# Patient Record
Sex: MALE | Race: WHITE | Employment: OTHER | URBAN - METROPOLITAN AREA
[De-identification: names, ages, dates, MRNs, and addresses within clinical notes are randomized per-mention and may not be internally consistent; named-entity substitution may affect disease eponyms.]

---

## 2017-05-23 ENCOUNTER — HOSPITAL ENCOUNTER (OUTPATIENT)
Dept: OTHER | Age: 72
Discharge: HOME | End: 2017-05-31
Attending: INTERNAL MEDICINE | Admitting: INTERNAL MEDICINE

## 2022-06-03 ENCOUNTER — HOSPITAL ENCOUNTER (INPATIENT)
Age: 77
LOS: 15 days | Discharge: NURSING, REHAB OR LONG-TERM CARE | DRG: 560 | End: 2022-06-18
Attending: STUDENT IN AN ORGANIZED HEALTH CARE EDUCATION/TRAINING PROGRAM | Admitting: STUDENT IN AN ORGANIZED HEALTH CARE EDUCATION/TRAINING PROGRAM
Payer: OTHER GOVERNMENT

## 2022-06-03 DIAGNOSIS — Z87.81 S/P LEFT HIP FRACTURE: Primary | ICD-10-CM

## 2022-06-03 LAB
ANION GAP SERPL CALCULATED.3IONS-SCNC: 8 MMOL/L (ref 3–16)
BUN BLDV-MCNC: 13 MG/DL (ref 7–20)
CALCIUM SERPL-MCNC: 8.7 MG/DL (ref 8.3–10.6)
CHLORIDE BLD-SCNC: 99 MMOL/L (ref 99–110)
CO2: 29 MMOL/L (ref 21–32)
CREAT SERPL-MCNC: 0.8 MG/DL (ref 0.8–1.3)
GFR AFRICAN AMERICAN: >60
GFR NON-AFRICAN AMERICAN: >60
GLUCOSE BLD-MCNC: 116 MG/DL (ref 70–99)
GLUCOSE BLD-MCNC: 142 MG/DL (ref 70–99)
GLUCOSE BLD-MCNC: 187 MG/DL (ref 70–99)
PERFORMED ON: ABNORMAL
PERFORMED ON: ABNORMAL
POTASSIUM REFLEX MAGNESIUM: 4 MMOL/L (ref 3.5–5.1)
SODIUM BLD-SCNC: 136 MMOL/L (ref 136–145)

## 2022-06-03 PROCEDURE — 80048 BASIC METABOLIC PNL TOTAL CA: CPT

## 2022-06-03 PROCEDURE — 6370000000 HC RX 637 (ALT 250 FOR IP): Performed by: STUDENT IN AN ORGANIZED HEALTH CARE EDUCATION/TRAINING PROGRAM

## 2022-06-03 PROCEDURE — 36415 COLL VENOUS BLD VENIPUNCTURE: CPT

## 2022-06-03 PROCEDURE — 1280000000 HC REHAB R&B

## 2022-06-03 RX ORDER — INSULIN GLARGINE 100 [IU]/ML
12 INJECTION, SOLUTION SUBCUTANEOUS NIGHTLY
Status: DISCONTINUED | OUTPATIENT
Start: 2022-06-03 | End: 2022-06-06

## 2022-06-03 RX ORDER — INSULIN LISPRO 100 [IU]/ML
0-6 INJECTION, SOLUTION INTRAVENOUS; SUBCUTANEOUS
Status: DISCONTINUED | OUTPATIENT
Start: 2022-06-03 | End: 2022-06-09

## 2022-06-03 RX ORDER — ENOXAPARIN SODIUM 100 MG/ML
40 INJECTION SUBCUTANEOUS DAILY
Status: DISCONTINUED | OUTPATIENT
Start: 2022-06-04 | End: 2022-06-18 | Stop reason: HOSPADM

## 2022-06-03 RX ORDER — PANTOPRAZOLE SODIUM 40 MG/1
40 TABLET, DELAYED RELEASE ORAL
Status: DISCONTINUED | OUTPATIENT
Start: 2022-06-04 | End: 2022-06-18 | Stop reason: HOSPADM

## 2022-06-03 RX ORDER — POLYETHYLENE GLYCOL 3350 17 G/17G
17 POWDER, FOR SOLUTION ORAL DAILY
Status: DISCONTINUED | OUTPATIENT
Start: 2022-06-04 | End: 2022-06-18 | Stop reason: HOSPADM

## 2022-06-03 RX ORDER — ALBUTEROL SULFATE 90 UG/1
2 AEROSOL, METERED RESPIRATORY (INHALATION) EVERY 6 HOURS PRN
Status: DISCONTINUED | OUTPATIENT
Start: 2022-06-03 | End: 2022-06-13

## 2022-06-03 RX ORDER — CHOLECALCIFEROL (VITAMIN D3) 125 MCG
1000 CAPSULE ORAL DAILY
Status: DISCONTINUED | OUTPATIENT
Start: 2022-06-04 | End: 2022-06-18 | Stop reason: HOSPADM

## 2022-06-03 RX ORDER — ASPIRIN 81 MG/1
81 TABLET ORAL DAILY
Status: DISCONTINUED | OUTPATIENT
Start: 2022-06-04 | End: 2022-06-18 | Stop reason: HOSPADM

## 2022-06-03 RX ORDER — TROSPIUM CHLORIDE 20 MG/1
20 TABLET, FILM COATED ORAL
Status: DISCONTINUED | OUTPATIENT
Start: 2022-06-03 | End: 2022-06-18 | Stop reason: HOSPADM

## 2022-06-03 RX ORDER — AMLODIPINE BESYLATE 5 MG/1
10 TABLET ORAL DAILY
Status: DISCONTINUED | OUTPATIENT
Start: 2022-06-04 | End: 2022-06-15

## 2022-06-03 RX ORDER — ATORVASTATIN CALCIUM 40 MG/1
40 TABLET, FILM COATED ORAL NIGHTLY
Status: DISCONTINUED | OUTPATIENT
Start: 2022-06-03 | End: 2022-06-18 | Stop reason: HOSPADM

## 2022-06-03 RX ORDER — VALSARTAN 80 MG/1
160 TABLET ORAL DAILY
Status: DISCONTINUED | OUTPATIENT
Start: 2022-06-04 | End: 2022-06-08

## 2022-06-03 RX ORDER — TRAZODONE HYDROCHLORIDE 50 MG/1
50 TABLET ORAL NIGHTLY PRN
Status: DISCONTINUED | OUTPATIENT
Start: 2022-06-03 | End: 2022-06-18 | Stop reason: HOSPADM

## 2022-06-03 RX ORDER — SENNA AND DOCUSATE SODIUM 50; 8.6 MG/1; MG/1
2 TABLET, FILM COATED ORAL DAILY
Status: DISCONTINUED | OUTPATIENT
Start: 2022-06-04 | End: 2022-06-06

## 2022-06-03 RX ORDER — ACETAMINOPHEN 325 MG/1
650 TABLET ORAL EVERY 6 HOURS PRN
Status: DISCONTINUED | OUTPATIENT
Start: 2022-06-03 | End: 2022-06-18 | Stop reason: HOSPADM

## 2022-06-03 RX ORDER — BISACODYL 10 MG
10 SUPPOSITORY, RECTAL RECTAL DAILY PRN
Status: DISCONTINUED | OUTPATIENT
Start: 2022-06-03 | End: 2022-06-18 | Stop reason: HOSPADM

## 2022-06-03 RX ORDER — OXYCODONE HYDROCHLORIDE 5 MG/1
5 TABLET ORAL EVERY 4 HOURS PRN
Status: DISCONTINUED | OUTPATIENT
Start: 2022-06-03 | End: 2022-06-08

## 2022-06-03 RX ORDER — ONDANSETRON 4 MG/1
4 TABLET, ORALLY DISINTEGRATING ORAL EVERY 8 HOURS PRN
Status: DISCONTINUED | OUTPATIENT
Start: 2022-06-03 | End: 2022-06-18 | Stop reason: HOSPADM

## 2022-06-03 RX ORDER — INSULIN LISPRO 100 [IU]/ML
0-3 INJECTION, SOLUTION INTRAVENOUS; SUBCUTANEOUS NIGHTLY
Status: DISCONTINUED | OUTPATIENT
Start: 2022-06-03 | End: 2022-06-09

## 2022-06-03 RX ORDER — DEXTROSE MONOHYDRATE 50 MG/ML
100 INJECTION, SOLUTION INTRAVENOUS PRN
Status: DISCONTINUED | OUTPATIENT
Start: 2022-06-03 | End: 2022-06-18 | Stop reason: HOSPADM

## 2022-06-03 RX ORDER — OXYBUTYNIN CHLORIDE 5 MG/1
5 TABLET ORAL 3 TIMES DAILY
Status: DISCONTINUED | OUTPATIENT
Start: 2022-06-03 | End: 2022-06-04

## 2022-06-03 RX ORDER — GABAPENTIN 300 MG/1
300 CAPSULE ORAL 2 TIMES DAILY
Status: DISCONTINUED | OUTPATIENT
Start: 2022-06-03 | End: 2022-06-18 | Stop reason: HOSPADM

## 2022-06-03 RX ADMIN — OXYBUTYNIN CHLORIDE 5 MG: 5 TABLET ORAL at 21:20

## 2022-06-03 RX ADMIN — INSULIN LISPRO 1 UNITS: 100 INJECTION, SOLUTION INTRAVENOUS; SUBCUTANEOUS at 21:22

## 2022-06-03 RX ADMIN — TROSPIUM CHLORIDE 20 MG: 20 TABLET, FILM COATED ORAL at 16:36

## 2022-06-03 RX ADMIN — GABAPENTIN 300 MG: 300 CAPSULE ORAL at 21:20

## 2022-06-03 RX ADMIN — ATORVASTATIN CALCIUM 40 MG: 40 TABLET, FILM COATED ORAL at 21:20

## 2022-06-03 RX ADMIN — TRAZODONE HYDROCHLORIDE 50 MG: 50 TABLET ORAL at 21:20

## 2022-06-03 RX ADMIN — OXYBUTYNIN CHLORIDE 5 MG: 5 TABLET ORAL at 16:36

## 2022-06-03 RX ADMIN — INSULIN GLARGINE 12 UNITS: 100 INJECTION, SOLUTION SUBCUTANEOUS at 21:22

## 2022-06-03 ASSESSMENT — PAIN SCALES - GENERAL
PAINLEVEL_OUTOF10: 3
PAINLEVEL_OUTOF10: 0

## 2022-06-03 ASSESSMENT — PAIN DESCRIPTION - ORIENTATION: ORIENTATION: LEFT

## 2022-06-03 ASSESSMENT — PAIN DESCRIPTION - LOCATION: LOCATION: HIP

## 2022-06-03 NOTE — H&P
Patient: Maria Esther Martines  [id number]  Date: 6/3/2022      Chief Complaint: left hip fracture    History of Present Illness/Hospital Course:  Maria Esther Martines is a 78year old male with a past medical history significant for DM2, HTN, HLD, BERNARDO, COPD, prostate cancer s/p prostatectomy (2019), and GERD who fell down 2 steps and was found to have left hip and femur fracture on 5/6/22. He had surgery in [place] and was discharge to SNF in [facility]. He left SNF due to being unhappy with his care there and presented to [facility] on 5/29/22 with hip pain. He is admitted to [facility] on 6/3/22 due to functional deficits below his baseline. Today he is seen in his room without family present. He reports hip pain with weight bearing. He otherwise denies acute complaints. He is highly motivated to work with therapy. Prior Level of Function:  Independent in self care, indoor mobility, stairs, functional cognition    Current Level of Function:  Setup/clean-up for eating, oral hygiene  Max assist for lower body dressing  Dependent for lying to sitting on side of bed, sit to stand  Mod assist for chair/bed transfers, walk 10 feet     has a past medical history of Diabetes mellitus ([address] and Hypertension. has no past surgical history on file. reports that he has never smoked. He does not have any smokeless tobacco history on file. He reports that he does not drink alcohol and does not use drugs. family history is not on file.     Current Facility-Administered Medications   Medication Dose Route Frequency Provider Last Rate Last Admin    acetaminophen (TYLENOL) tablet 650 mg  650 mg Oral Q6H PRN Ana Sotelo MD        [START ON 6/4/2022] enoxaparin (LOVENOX) injection 40 mg  40 mg SubCUTAneous Daily Ana Sotelo MD        oxyCODONE (ROXICODONE) immediate release tablet 5 mg  5 mg Oral Q4H PRN Ana Sotelo MD        glucose chewable tablet 16 g  4 tablet Oral PRN Ana Sotelo MD        dextrose bolus 10% 125 mL  125 mL IntraVENous PRN Megha Newton MD        Or    dextrose bolus 10% 250 mL  250 mL IntraVENous PRN Megha Newton MD        glucagon (rDNA) injection 1 mg  1 mg IntraMUSCular PRN Megha Newton MD        dextrose 5 % solution  100 mL/hr IntraVENous PRN Megha Newton MD        insulin glargine (LANTUS) injection vial 12 Units  12 Units SubCUTAneous Nightly Megha Newton MD        insulin lispro (HUMALOG) injection vial 0-6 Units  0-6 Units SubCUTAneous TID [address] Megha Newton MD        insulin lispro (HUMALOG) injection vial 0-3 Units  0-3 Units SubCUTAneous Nightly Megha Newton MD        albuterol sulfate  (90 Base) MCG/ACT inhaler 2 puff  2 puff Inhalation Q6H PRN Megha Newton MD        [START ON 6/4/2022] amLODIPine (NORVASC) tablet 10 mg  10 mg Oral Daily Megha Newton MD        [START ON 6/4/2022] aspirin EC tablet 81 mg  81 mg Oral Daily Megha Newton MD        bisacodyl (DULCOLAX) suppository 10 mg  10 mg Rectal Daily PRN Megha Newton MD        [START ON 6/4/2022] vitamin B-12 (CYANOCOBALAMIN) tablet 1,000 mcg  1,000 mcg Oral Daily Megha Newton MD        gabapentin (NEURONTIN) capsule 300 mg  300 mg Oral BID Megha Newton MD        [START ON 6/4/2022] pantoprazole (PROTONIX) tablet 40 mg  40 mg Oral QAM AC Megha Newton MD        ondansetron (ZOFRAN-ODT) disintegrating tablet 4 mg  4 mg Oral Q8H PRN Megha Newton MD        [START ON 6/4/2022] polyethylene glycol (GLYCOLAX) packet 17 g  17 g Oral Daily Megha Newton MD        atorvastatin (LIPITOR) tablet 40 mg  40 mg Oral Nightly Megha Newton MD        [START ON 6/4/2022] sennosides-docusate sodium (SENOKOT-S) 8.6-50 MG tablet 2 tablet  2 tablet Oral Daily Megha Newton MD        [START ON 6/4/2022] sertraline (ZOLOFT) tablet 100 mg  100 mg Oral Daily Megha Newton MD        trospium [facility]) tablet 20 mg  20 mg Oral BID AC Melissa Mendoza MD        [START ON 6/4/2022] valsartan (DIOVAN) tablet 160 mg  160 mg Oral Daily Melissa Mendoza MD        oxybutynin [facility]) tablet 5 mg  5 mg Oral TID Melissa Mendoza MD           Allergies   Allergen Reactions    Cipro [Ciprofloxacin Hcl] Hives    Lisinopril Other (See Comments)     cough    Metformin And Related Diarrhea    Penicillins        Current Facility-Administered Medications   Medication Dose Route Frequency Provider Last Rate Last Admin    acetaminophen (TYLENOL) tablet 650 mg  650 mg Oral Q6H PRN Melissa Mendoza MD        [START ON 6/4/2022] enoxaparin (LOVENOX) injection 40 mg  40 mg SubCUTAneous Daily Melissa Mendoza MD        oxyCODONE (ROXICODONE) immediate release tablet 5 mg  5 mg Oral Q4H PRN Melissa Mendoza MD        glucose chewable tablet 16 g  4 tablet Oral PRN Melissa Mendoza MD        dextrose bolus 10% 125 mL  125 mL IntraVENous PRN Melissa Mendoza MD        Or    dextrose bolus 10% 250 mL  250 mL IntraVENous PRN Melissa Mendoza MD        glucagon (rDNA) injection 1 mg  1 mg IntraMUSCular PRN Melissa Mendoza MD        dextrose 5 % solution  100 mL/hr IntraVENous PRN Melissa Mendoza MD        insulin glargine (LANTUS) injection vial 12 Units  12 Units SubCUTAneous Nightly Melissa Mendoza MD        insulin lispro (HUMALOG) injection vial 0-6 Units  0-6 Units SubCUTAneous TID [facility] Melissa Mendoza MD        insulin lispro (HUMALOG) injection vial 0-3 Units  0-3 Units SubCUTAneous Nightly Melissa Mendoza MD        albuterol sulfate  (90 Base) MCG/ACT inhaler 2 puff  2 puff Inhalation Q6H PRN Melissa Mendoza MD        [START ON 6/4/2022] amLODIPine (NORVASC) tablet 10 mg  10 mg Oral Daily Melissa Mendoza MD        [START ON 6/4/2022] aspirin EC tablet 81 mg  81 mg Oral Daily Melissa Mendoza MD        bisacodyl (DULCOLAX) suppository 10 mg  10 mg Rectal Daily PRN Rubi Pulido MD        [START ON 6/4/2022] vitamin B-12 (CYANOCOBALAMIN) tablet 1,000 mcg  1,000 mcg Oral Daily Rubi Pulido MD        gabapentin (NEURONTIN) capsule 300 mg  300 mg Oral BID Rubi Pulido MD        [START ON 6/4/2022] pantoprazole (PROTONIX) tablet 40 mg  40 mg Oral QAM AC Rubi Pulido MD        ondansetron (ZOFRAN-ODT) disintegrating tablet 4 mg  4 mg Oral Q8H PRN Rubi Pulido MD        [START ON 6/4/2022] polyethylene glycol (GLYCOLAX) packet 17 g  17 g Oral Daily Rubi Pulido MD        atorvastatin (LIPITOR) tablet 40 mg  40 mg Oral Nightly Rubi Pulido MD        [START ON 6/4/2022] sennosides-docusate sodium (SENOKOT-S) 8.6-50 MG tablet 2 tablet  2 tablet Oral Daily Rubi Pulido MD        [START ON 6/4/2022] sertraline (ZOLOFT) tablet 100 mg  100 mg Oral Daily Rubi Pulido MD        [facility]) tablet 20 mg  20 mg Oral BID AC Rubi Pulido MD        [START ON 6/4/2022] valsartan (DIOVAN) tablet 160 mg  160 mg Oral Daily Rubi Pulido MD        oxybutynin [facility]) tablet 5 mg  5 mg Oral TID Rubi Pulido MD             REVIEW OF SYSTEMS:   CONSTITUTIONAL: negative for fevers, chills, diaphoresis, activity change, appetite change, fatigue, night sweats and unexpected weight change. EYES: negative for blurred vision, eye discharge, visual disturbance and icterus. HEENT: negative for hearing loss, tinnitus, ear drainage, sinus pressure, nasal congestion, epistaxis and snoring. RESPIRATORY: Negative for hemoptysis, cough, sputum production. CARDIOVASCULAR: negative for chest pain, palpitations, exertional chest pressure/discomfort, edema, syncope. GASTROINTESTINAL: negative for nausea, vomiting, diarrhea, constipation, blood in stool and abdominal pain.    GENITOURINARY: negative for frequency, dysuria, urinary incontinence, decreased urine volume, and hematuria. HEMATOLOGIC/LYMPHATIC: negative for easy bruising, bleeding and lymphadenopathy. ALLERGIC/IMMUNOLOGIC: negative for recurrent infections, angioedema, anaphylaxis and drug reactions. ENDOCRINE: negative for weight changes and diabetic symptoms including polyuria, polydipsia and polyphagia. MUSCULOSKELETAL: negative for pain, joint swelling, decreased range of motion and muscle weakness. NEUROLOGICAL: negative for headaches, slurred speech, unilateral weakness. PSYCHIATRIC/BEHAVIORAL: negative for hallucinations, behavioral problems, confusion and agitation. All pertinent positives are noted in the HPI. Physical Examination:  Vitals:   Patient Vitals for the past 24 hrs:   BP Temp Temp src Pulse Resp SpO2   06/03/22 1400 (!) 110/56 97.5 °F (36.4 °C) Oral 52 16 96 %     Psych: Stable mood, normal judgement, normal affect   Const: No distress  Eyes: Conjunctiva noninjected, no icterus noted; pupils equal, round, and reactive to light. HENT: Atraumatic, normocephalic; Oral mucosa moist  Neck: Trachea midline, neck supple. No thyromegaly noted. CV: Regular rate and rhythm, no murmur rub or gallop noted  Resp: Lungs clear to auscultation bilaterally, no rales wheezes or ronchi, no retractions. Respirations unlabored. GI: Soft, nontender, nondistended. Normal bowel sounds. No palpable masses. Neuro: Alert, oriented, appropriate. No cranial nerve deficits appreciated. Sensation intact to light touch. Motor examination reveals normal strength in all four limbs diffusely. No abnormalities with finger/nose or heel/shin noted. Reflexes normal and symmetric. Skin: Normal temperature and turgor  MSK: No joint abnormalities noted. Ext: No significant edema appreciated. No varicosities.     Lab Results   Component Value Date    WBC 10.1 06/28/2013    HGB 13.8 06/28/2013    HCT 42.0 06/28/2013    MCV 89.4 06/28/2013     06/28/2013     Lab Results   Component Value Date    INR 0.96 06/28/2013    PROTIME 11.0 06/28/2013     Lab Results   Component Value Date    CREATININE 1.2 06/28/2013    BUN 20 (H) 06/28/2013     06/28/2013    K 3.9 06/28/2013     06/28/2013    CO2 28 06/28/2013     Lab Results   Component Value Date    ALT 18 06/28/2013    AST 32 06/28/2013    ALKPHOS 129 06/28/2013    BILITOT 0.36 06/28/2013     IMAGING    05/29/2022 5:31 AM EDT    IMPRESSION:    Acute/subacute appearing fracture of the left lesser trochanter. Status post intramedullary nail fixation of the left proximal femur without evidence of complication. Approved by Car Segura MD on 5/29/2022 5:20 AM EDT    I have personally reviewed the images and I agree with this report. Report Verified by: America Acosta MD at 5/29/2022 5:31 AM EDT       X-ray Femur Left min 2-views (05/29/2022 4:50 AM EDT)   Narrative   05/29/2022 5:31 AM EDT    EXAM: XR FEMUR LEFT MINIMUM 2-VIEWS  EXAM: XR PELVIS 1 OR 2-VIEWS     INDICATION: Pain     COMPARISON: None. TECHNIQUE: 2 views of the left femur and one AP view of the pelvis. FINDINGS:    Minimally displaced fracture of the left lesser trochanter, acute/subacute appearance. Postsurgical changes of left femoral intramedullary nailing with no perihardware lucencies or fractures. No radiographic soft tissue abnormality. A cylindrical radiodensity overlying the medial soft tissues of the left thigh is favored external to the patient. The bilateral SI joints and pubic symphysis appear well-maintained. Mild to moderate right hip arthrosis. Chondrocalcinosis is partially seen involving the medial lateral knee compartments.           The above laboratory data have been reviewed. The above imaging data have been reviewed. The above medical testing have been reviewed. There is no height or weight on file to calculate BMI.     Barriers to Discharge: pain, decreased endurance, comorbidities    POST ADMISSION PHYSICIAN EVALUATION  The patient has agreed to being admitted to our comprehensive inpatient rehabilitation facility consisting of at least 180 minutes of therapy a day, 5 out of 7 days a week. The patient/family has a good understanding of our discharge process. The patient has potential to make improvement and is in need of at least two of the following multidisciplinary therapies including but not limited to physical, occupational, respiratory, and speech, nutritional services, wound care, and prosthetics and orthotics. Given the patients complex condition and risk of further medical complications, rehabilitation services cannot be safely provided at a lower level of care such as a skilled nursing facility. I have compared the patients medical and functional status at the time of the preadmission screening and the same on this date, and there are no significant changes. By signing this document, I acknowledge that I have personally performed a full physical examination on this patient within 24 hours of admission to this inpatient rehabilitation facility and have determined the patient to be able to tolerate the above course of treatment at an intensive level for a reasonable period of time. I will be completing a detailed individualized Plan of Care for this patient by day four of the patients stay based upon the Preadmission Screen, this Post-Admission Evaluation, and the therapy evaluations. Rehabilitation Diagnosis:  Orthopedic, 8.11, Unilateral Hip Fracture    Assessment and Plan:  Harshad Anna is a 78year old male with a past medical history significant for DM2, HTN, HLD, BERNARDO, COPD, prostate cancer s/p prostatectomy (2019), and GERD who fell down 2 steps and was found to have left hip and femur fracture on 5/6/22 s/p surgery. He is admitted to [facility] on 6/3/22 due to functional deficits below his baseline.     Left Hip fracture  - s/p repair in [address]  - pain control  - PT and OT    DM2  - Lantus plus SSI  - has metformin listed as allergy    COPD  - wean oxygen for SpO2>90%    BERNARDO  - CPAP qhs    HLD  - statin    History of Prostate Cancer  - s/p prostatectomy in 2019  - was recently told that his cancer is back and tentative plan to resume chemo  - follow up outpatient    Obesity  - BMI 41   - encourage lifestyle modifications    Bowels: Schedule stool softener. Follow bowel movements. Enema or suppository if needed. Bladder: Check PVR x 3. [address] if PVR > 200ml or if any volume is > 500 ml. Sleep: Trazodone provided prn. Follow up appointments: PCP    Cielo Fuentes MD 6/3/2022, 2:37 PM

## 2022-06-03 NOTE — PROGRESS NOTES
Patient admitted to room 151, oriented to room, bed, call light, schedules, fall precautions. Assessment completed. VSS. Meals ordered. Patient resting in bed, call light within reach. Bed alarm on, patient will call for assistance.

## 2022-06-03 NOTE — PROGRESS NOTES
06/03/22 1617   RT Protocol   History Pulmonary Disease 0   Respiratory pattern 0   Breath sounds 0   Cough 0   Bronchodilator Assessment Score 0

## 2022-06-03 NOTE — PLAN OF CARE
[id number]  [facility] [address]   [phone]    Nany Lezama    : 1945  Acct #: [de-identified]  MRN: [id number]   PHYSICIAN:  Raven Bell MD  Primary Problem    Patient Active Problem List   Diagnosis    S/p left hip fracture       Rehabilitation Diagnosis:     S/p left hip fracture [Z87.81]       ADMIT DATE:6/3/2022    Patient Goals: \"Get independent and go home\"    Admitting Impairments: Pt. Admitted s/p L hip fracture resulting in Decreased functional mobility ; Decreased safe awareness;Decreased balance;Decreased ADL status; Decreased ROM; Decreased strength;Decreased sensation;Decreased endurance;Decreased high-level IADLs;Decreased cognition;Decreased posture  Barriers: pain, decreased endurance, comorbidities  Participation: Good     CARE PLAN     NURSING:  Nany Lezama while on this unit will:     [x] Be continent of bowel and bladder     [x] Have an adequate number of bowel movements  [x] Urinate with no urinary retention >300ml in bladder  [] Complete bladder protocol with leiva removal  [x] Maintain O2 SATs at ___%  [x] Have pain managed while on ARU       [x] Be pain free by discharge   [x] Have no skin breakdown while on ARU  [x] Have improved skin integrity via wound measurements  [x] Have no signs/symptoms of infection at the wound site  [x] Be free from injury during hospitalization   [x] Complete education with patient/family with understanding demonstrated for:  [x] Adjustment   [] Other:   Nursing interventions may include bowel/bladder training, education for medical assistive devices, medication education, O2 saturation management, energy conservation, stress management techniques, fall prevention, alarms protocol, seating and positioning, skin/wound care, pressure relief instruction,dressing changes,  infection protection, DVT prophylaxis, and/or assistance with in room safety with transfers to bed, toilet, wheelchair, shower as well as bathroom activities and hygiene. Patient/caregiver education for:   [x] Disease/sustained injury/management      [x] Medication Use   [x] Surgical intervention   [x] Safety   [x] Body mechanics and or joint protection   [x] Health maintenance         PHYSICAL THERAPY:  Goals:                  Short Term Goals  Time Frame for Short term goals: 10 days 6/12/22  Short term goal 1: Pt will complete supine to/from sit with Cristobal  Short term goal 2: Pt will complete sit to/from stand with RW with Cristobal  Short term goal 3: Pt will complete bed <> chair with RW with modA  Short term goal 4: Pt will ambulate x 15' with RW with modA without LOB            Long Term Goals  Time Frame for Long term goals : 21 days 6/23/22  Long term goal 1: Pt will complete supine to/from sit with SBA  Long term goal 2: Pt will complete functional t/f with RW with CGA  Long term goal 3: Pt will ambulate x 48' with RW with CGA without LOB  Long term goal 4: Pt will complete car t/f with RW and CGA  These goals were reviewed with this patient at the time of assessment and Briseyda Rodriguez is in agreement. Plan of Care: Pt to be seen 5 out of 7 days per week, 60   mins (exact) per day for 21 days (exact)                   Current Treatment Recommendations: Zoe Nelson mobility training,Equipment evaluation, education, & procurement,Balance training,Gait training,Pain management,Modalities,Stair training,Functional mobility training,Transfer training,Neuromuscular re-education,Home exercise program,Positioning,Safety education & training,Manual Therapy - Soft Tissue Mobilization,Patient/Caregiver education & training,Therapeutic activities,Endurance training      OCCUPATIONAL THERAPY:  Goals:             Short Term Goals  Time Frame for Short term goals: 1 week- 6/10/22  Short Term Goal 1: Pt will perform functional transfers with max A. Short Term Goal 2: Pt will complete toileting with max A.   Short Term Goal 3: Pt will perform LB dressing with mod A. Short Term Goal 4: Pt will complete bathing with mod A. :  Long Term Goals  Time Frame for Long term goals : 3 weeks- 6/23/22  Long Term Goal 1: Pt will complete functional transfers with CGA. Long Term Goal 2: Pt will perform bathing with CGA. Long Term Goal 3: Pt will complete full body dressing with CGA. Long Term Goal 4: Pt will perform toileting with CGA. Long Term Goal 5: Pt will perform grooming at sink with mod I. :    These goals were reviewed with this patient at the time of assessment and Duong Mark is in agreement    Plan of Care:  Pt to be seen 5 out of 7 days per week, 60   mins (exact) per day for 21 days (exact)  Current Treatment Recommendations: Strengthening,ROM,Balance training,Functional mobility training,Wheelchair mobility training,Safety education & training,Neuromuscular re-education,Cognitive reorientation,Endurance training,Pain management,Self-Care / ADL,Home management training,Cognitive/Perceptual training,Coordination training,Equipment evaluation, education, & procurement,Patient/Caregiver education & training      SPEECH THERAPY:   Goals:             Short-term Goals  Timeframe for Short-term Goals: 14 days (6/18/22)              Dysphagia Goals: The patient will tolerate recommended diet without observed clinical signs of aspiration,The patient/caregiver will demonstrate understanding of compensatory strategies for improved swallowing safety. Short-term Goals  Timeframe for Short-term Goals: 14 days (6/18/22)  Goal 1: Pt will complete graded recall tasks using compensatory strategies with 80% acc given mod cues. Goal 2: Pt will complete executive function tasks (meds, money, math, time, etc) with 80% acc given mod cues.   Goal 3: Pt will complete verbal and visual reasoning tasks with 80% acc given mod cues  Goal 4: Pt will complete problem solving and thought organization tasks with 80% acc given mod cues  Goal 5: Pt will complete graded attention tasks (e.g. sustained, selective, alternative, divided) with 80% acc given min cues              Timeframe for Short-term Goals: 14 days (6/18/22)  These goals were reviewed with this patient at the time of assessment and Sherri Jean is in agreement    Plan of Care: Pt to be seen 5 out of 7 days per week, 60 mins (exact) per day for 21 days (exact)             Dysphagia:   Diet tolerance monitoring, compensatory strategy training and carryover           Speech/Language/Cognition: Compensatory strategy training and carryover, recall/STM, problem solving, reasoning, exec function, thought organization, attention. CASE MANAGEMENT:  Goals:   Assist patient/family with discharge planning, patient/family counseling,   and coordination with insurance during ARU stay.     QIM / IRF MICHAEL SCORES:  ITEM CURRENT SCORE GOAL   Eating CARE Score: 4 Discharge Goal: Independent   Oral Hygiene CARE Score: 88 Discharge Goal: Independent   Toileting Hygiene CARE Score: 1 Discharge Goal: Supervision or touching assistance   Shower/Bathe Self CARE Score: 1 Discharge Goal: Supervision or touching assistance   Upper Body Dressing CARE Score: 1 Discharge Goal: Supervision or touching assistance   Lower Body Dressing CARE Score: 1 Discharge Goal: Supervision or touching assistance   On/Off Footwear CARE Score: 1 Discharge Goal: Supervision or touching assistance   Roll Left & Right CARE Score: 2 Discharge Goal: Supervision or touching assistance   Sit to Lying  CARE Score: 3 Discharge Goal: Supervision or touching assistance   Lying to Sitting EOB CARE Score: 2 Discharge Goal: Supervision or touching assistance   Sit to Stand CARE Score: 1 Discharge Goal: Supervision or touching assistance   Chair/Bed to Chair Transfer CARE Score: 88 Discharge Goal: Supervision or touching assistance   Toilet Transfer CARE Score: 1 Discharge Goal: Supervision or touching assistance   Car Transfer CARE Score: 88 Discharge Goal: Supervision or touching assistance   Walk 10 Feet CARE Score: 88 Discharge Goal: Supervision or touching assistance   Walk 50 Feet, 2 Turns CARE Score: 88 Discharge Goal: Supervision or touching assistance   Walk 150 Feet CARE Score: 88 Discharge Goal: Partial/moderate assistance   Walk 10 Feet, Uneven Surface CARE Score: 88 Discharge Goal: Supervision or touching assistance   1 Step (Curb) CARE Score: 88 Discharge Goal: Partial/moderate assistance   4 Steps CARE Score: 88 Discharge Goal: Substantial/maximal assistance   12 Steps CARE Score: 88 Discharge Goal: Substantial/maximal assistance    Object CARE Score: 88 Discharge Goal: Partial/moderate assistance   Wheel 50 feet, 2 turns CARE Score: 3 Discharge Goal: Independent   Wheel 150 Feet CARE Score: 3 Discharge Goal: Independent          Leslie Halo will be seen a minimum of 3 hours of therapy per day, a minimum of 5 out of 7 days per week. [] In this rare instance due to the nature of this patient's medical involvement, this patient will be seen 15 hours per week (900 minutes within a 7 day period). Treatments may include therapeutic exercises, gait training, neuromuscular re-ed, transfer training, community reintegration, bed mobility, w/c mobility and training, self care, home mgmt, cognitive training, energy conservation,dysphagia tx, speech/language/communication therapy, group therapy, and patient/family education. In addition, dietician/nutritionist may monitor calorie count as well as intake and collaboratively work with SLP on dietary upgrades. Neuropsychology/Psychology may evaluate and provide necessary support.     Medical issues being managed closely and that require 24 hour availability of a physician:   [] Swallowing Precautions  [x] Bowel/Bladder Fx  [] Weight bearing precautions   [x] Wound Care    [x] Pain Mgmt   [x] Infection Protection   [x] DVT Prophylaxis   [x] Fall Precautions  [x] Fluid/Electrolyte/Nutrition Balance   [] Voice Protection   [] Respiratory  [] Other:    Medical Prognosis: [x] Good  [] Fair    [] Guarded   Total expected IRF days 21  Anticipated discharge destination:    [] Home Independently   [] Home Modified Independent  [x] Home with supervision    []SNF     [] Other                                           Physician anticipated functional outcomes:  Home w/ supervision and home health services. IPOC brief synthesis: Doron New is a 78year old male with a past medical history significant for DM2, HTN, HLD, BERNARDO, COPD, prostate cancer s/p prostatectomy (2019), and GERD who fell down 2 steps and was found to have left hip and femur fracture on 5/6/22 s/p surgery. He was admitted to [facility] on 6/3/22 due to functional deficits below his baseline. This plan has been reviewed with Doron New on 6/4  in a language the patient understands. Doron New has had the opportunity to include input with the therapy team.      I have reviewed this initial plan of care and agree with its contents:    Title   Name    Date    Time    Physician: Jes Knox.  Fredy Benitez MD    Case Mgmt: Kaykay Chavez RN    OT: Ezra Laguerre, OTR/L    PT: Saul Cartagena PT, DPT    RN: Derrek Ormond RN BSN    ST: Deandre Mazariegos MA, CCC-SLP    : Janna Pendleton OTR/L    Other:

## 2022-06-03 NOTE — RT PROTOCOL NOTE
RT Inhaler-Nebulizer Bronchodilator Protocol Note    There is a bronchodilator order in the chart from a provider indicating to follow the RT Bronchodilator Protocol and there is an Initiate RT Inhaler-Nebulizer Bronchodilator Protocol order as well (see protocol at bottom of note). CXR Findings:  No results found. The findings from the last RT Protocol Assessment were as follows:   History Pulmonary Disease: None or smoker <15 pack years  Respiratory Pattern: Regular pattern and RR 12-20 bpm  Breath Sounds: Clear breath sounds  Cough: Strong, spontaneous, non-productive  Indication for Bronchodilator Therapy:    Bronchodilator Assessment Score: 0    Aerosolized bronchodilator medication orders have been revised according to the RT Inhaler-Nebulizer Bronchodilator Protocol below. Respiratory Therapist to perform RT Therapy Protocol Assessment initially then follow the protocol. Repeat RT Therapy Protocol Assessment PRN for score 0-3 or on second treatment, BID, and PRN for scores above 3. No Indications - adjust the frequency to every 6 hours PRN wheezing or bronchospasm, if no treatments needed after 48 hours then discontinue using Per Protocol order mode. If indication present, adjust the RT bronchodilator orders based on the Bronchodilator Assessment Score as indicated below. Use Inhaler orders unless patient has one or more of the following: on home nebulizer, not able to hold breath for 10 seconds, is not alert and oriented, cannot activate and use MDI correctly, or respiratory rate 25 breaths per minute or more, then use the equivalent nebulizer order(s) with same Frequency and PRN reasons based on the score. If a patient is on this medication at home then do not decrease Frequency below that used at home.     0-3 - enter or revise RT bronchodilator order(s) to equivalent RT Bronchodilator order with Frequency of every 4 hours PRN for wheezing or increased work of breathing using Per Protocol order mode. 4-6 - enter or revise RT Bronchodilator order(s) to two equivalent RT bronchodilator orders with one order with BID Frequency and one order with Frequency of every 4 hours PRN wheezing or increased work of breathing using Per Protocol order mode. 7-10 - enter or revise RT Bronchodilator order(s) to two equivalent RT bronchodilator orders with one order with TID Frequency and one order with Frequency of every 4 hours PRN wheezing or increased work of breathing using Per Protocol order mode. 11-13 - enter or revise RT Bronchodilator order(s) to one equivalent RT bronchodilator order with QID Frequency and an Albuterol order with Frequency of every 4 hours PRN wheezing or increased work of breathing using Per Protocol order mode. Greater than 13 - enter or revise RT Bronchodilator order(s) to one equivalent RT bronchodilator order with every 4 hours Frequency and an Albuterol order with Frequency of every 2 hours PRN wheezing or increased work of breathing using Per Protocol order mode. RT to enter RT Home Evaluation for COPD & MDI Assessment order using Per Protocol order mode.     Electronically signed by Gino Camejo RCP on 6/3/2022 at 4:18 PM

## 2022-06-03 NOTE — PLAN OF CARE
4 Eyes Skin Assessment     The patient is being assess for   Admission    I agree that 2 RN's have performed a thorough Head to Toe Skin Assessment on the patient. ALL assessment sites listed below have been assessed. Areas assessed for pressure by both nurses:   [x]   Head, Face, and Ears   [x]   Shoulders, Back, and Chest, Abdomen  [x]   Arms, Elbows, and Hands   [x]   Coccyx, Sacrum, and Ischium  [x]   Legs, Feet, and Heels        Skin Assessed Under all Medical Devices by both nurses:  {Medical devices:25436}              All Mepilex Borders were peeled back and area peeked at by both nurses:  Yes  Please list where Mepilex Borders are located:           n/a    **SHARE this note so that the co-signing nurse is able to place an eSignature**    Co-signer eSignature: Electronically signed by Donella Mcardle, RN on 6/3/22 at 6:43 PM EDT    Does the Patient have Skin Breakdown related to pressure?   Yes LDA WOUND CARE was Initiated documentation include the Marie-wound, Wound Assessment, Measurements, Dressing Treatment, Drainage, and Color\",     (Insert Photo here)         Tony Prevention initiated:  Yes   Wound Care Orders initiated:  Yes      [id number] nurse consulted for Pressure Injury (Stage 3,4, Unstageable, DTI, NWPT, Complex wounds)and New or Established Ostomies:  No      Primary Nurse eSignature: Electronically signed by Martir Gutierrez RN on 6/3/22 at 5:08 PM EDT

## 2022-06-04 LAB
BILIRUBIN URINE: NEGATIVE
BLOOD, URINE: NEGATIVE
CLARITY: CLEAR
COLOR: YELLOW
GLUCOSE BLD-MCNC: 146 MG/DL (ref 70–99)
GLUCOSE BLD-MCNC: 202 MG/DL (ref 70–99)
GLUCOSE BLD-MCNC: 220 MG/DL (ref 70–99)
GLUCOSE BLD-MCNC: 279 MG/DL (ref 70–99)
GLUCOSE URINE: NEGATIVE MG/DL
KETONES, URINE: NEGATIVE MG/DL
LEUKOCYTE ESTERASE, URINE: NEGATIVE
MICROSCOPIC EXAMINATION: NORMAL
NITRITE, URINE: NEGATIVE
PERFORMED ON: ABNORMAL
PH UA: 6.5 (ref 5–8)
PROTEIN UA: NEGATIVE MG/DL
SPECIFIC GRAVITY UA: 1.01 (ref 1–1.03)
URINE REFLEX TO CULTURE: NORMAL
URINE TYPE: NORMAL
UROBILINOGEN, URINE: 0.2 E.U./DL

## 2022-06-04 PROCEDURE — 92610 EVALUATE SWALLOWING FUNCTION: CPT

## 2022-06-04 PROCEDURE — 1280000000 HC REHAB R&B

## 2022-06-04 PROCEDURE — 97530 THERAPEUTIC ACTIVITIES: CPT

## 2022-06-04 PROCEDURE — 92523 SPEECH SOUND LANG COMPREHEN: CPT

## 2022-06-04 PROCEDURE — 97167 OT EVAL HIGH COMPLEX 60 MIN: CPT

## 2022-06-04 PROCEDURE — 97535 SELF CARE MNGMENT TRAINING: CPT

## 2022-06-04 PROCEDURE — 97542 WHEELCHAIR MNGMENT TRAINING: CPT

## 2022-06-04 PROCEDURE — 97162 PT EVAL MOD COMPLEX 30 MIN: CPT

## 2022-06-04 PROCEDURE — 81003 URINALYSIS AUTO W/O SCOPE: CPT

## 2022-06-04 PROCEDURE — 6370000000 HC RX 637 (ALT 250 FOR IP): Performed by: STUDENT IN AN ORGANIZED HEALTH CARE EDUCATION/TRAINING PROGRAM

## 2022-06-04 PROCEDURE — 6360000002 HC RX W HCPCS: Performed by: STUDENT IN AN ORGANIZED HEALTH CARE EDUCATION/TRAINING PROGRAM

## 2022-06-04 PROCEDURE — 97110 THERAPEUTIC EXERCISES: CPT

## 2022-06-04 RX ADMIN — INSULIN LISPRO 2 UNITS: 100 INJECTION, SOLUTION INTRAVENOUS; SUBCUTANEOUS at 16:30

## 2022-06-04 RX ADMIN — OXYCODONE 5 MG: 5 TABLET ORAL at 13:00

## 2022-06-04 RX ADMIN — DOCUSATE SODIUM 50 MG AND SENNOSIDES 8.6 MG 1 TABLET: 8.6; 5 TABLET, FILM COATED ORAL at 09:45

## 2022-06-04 RX ADMIN — ENOXAPARIN SODIUM 40 MG: 100 INJECTION SUBCUTANEOUS at 09:42

## 2022-06-04 RX ADMIN — INSULIN GLARGINE 12 UNITS: 100 INJECTION, SOLUTION SUBCUTANEOUS at 20:24

## 2022-06-04 RX ADMIN — GABAPENTIN 300 MG: 300 CAPSULE ORAL at 20:24

## 2022-06-04 RX ADMIN — OXYBUTYNIN CHLORIDE 5 MG: 5 TABLET ORAL at 09:43

## 2022-06-04 RX ADMIN — TROSPIUM CHLORIDE 20 MG: 20 TABLET, FILM COATED ORAL at 05:51

## 2022-06-04 RX ADMIN — VALSARTAN 160 MG: 80 TABLET, FILM COATED ORAL at 09:42

## 2022-06-04 RX ADMIN — SERTRALINE 100 MG: 50 TABLET, FILM COATED ORAL at 09:42

## 2022-06-04 RX ADMIN — TRAZODONE HYDROCHLORIDE 50 MG: 50 TABLET ORAL at 20:24

## 2022-06-04 RX ADMIN — OXYBUTYNIN CHLORIDE 5 MG: 5 TABLET ORAL at 13:00

## 2022-06-04 RX ADMIN — ACETAMINOPHEN 325MG 650 MG: 325 TABLET ORAL at 11:41

## 2022-06-04 RX ADMIN — ATORVASTATIN CALCIUM 40 MG: 40 TABLET, FILM COATED ORAL at 20:24

## 2022-06-04 RX ADMIN — PANTOPRAZOLE SODIUM 40 MG: 40 TABLET, DELAYED RELEASE ORAL at 05:51

## 2022-06-04 RX ADMIN — INSULIN LISPRO 1 UNITS: 100 INJECTION, SOLUTION INTRAVENOUS; SUBCUTANEOUS at 20:25

## 2022-06-04 RX ADMIN — OXYCODONE 5 MG: 5 TABLET ORAL at 12:52

## 2022-06-04 RX ADMIN — OXYCODONE 5 MG: 5 TABLET ORAL at 20:24

## 2022-06-04 RX ADMIN — ASPIRIN 81 MG: 81 TABLET, COATED ORAL at 09:43

## 2022-06-04 RX ADMIN — INSULIN LISPRO 2 UNITS: 100 INJECTION, SOLUTION INTRAVENOUS; SUBCUTANEOUS at 05:52

## 2022-06-04 RX ADMIN — GABAPENTIN 300 MG: 300 CAPSULE ORAL at 09:43

## 2022-06-04 RX ADMIN — AMLODIPINE BESYLATE 10 MG: 5 TABLET ORAL at 09:43

## 2022-06-04 RX ADMIN — INSULIN LISPRO 3 UNITS: 100 INJECTION, SOLUTION INTRAVENOUS; SUBCUTANEOUS at 11:36

## 2022-06-04 RX ADMIN — CYANOCOBALAMIN TAB 500 MCG 1000 MCG: 500 TAB at 09:45

## 2022-06-04 ASSESSMENT — PAIN - FUNCTIONAL ASSESSMENT: PAIN_FUNCTIONAL_ASSESSMENT: PREVENTS OR INTERFERES SOME ACTIVE ACTIVITIES AND ADLS

## 2022-06-04 ASSESSMENT — PAIN SCALES - GENERAL
PAINLEVEL_OUTOF10: 7
PAINLEVEL_OUTOF10: 8
PAINLEVEL_OUTOF10: 7
PAINLEVEL_OUTOF10: 7

## 2022-06-04 ASSESSMENT — PAIN DESCRIPTION - DESCRIPTORS
DESCRIPTORS: SHARP
DESCRIPTORS: SHARP

## 2022-06-04 NOTE — PROGRESS NOTES
Speech Language Pathology  Facility/Department: [facility] ARU  Initial Speech/Language/Cognitive Assessment    NAME: Arik Bautista  : 1945   MRN: [id number]  ADMISSION DATE: 6/3/2022  ADMITTING DIAGNOSIS: has S/p left hip fracture on their problem list.  DATE ONSET: 22    Date of Eval: 2022   Evaluating Therapist: ALPA Palacio    RECENT RESULTS  CT OF HEAD/MRI: N/A    Primary Complaint: Per MD H&P: \"\"Saravanan Red is a 78year old male with a past medical history significant for DM2, HTN, HLD, BERNARDO, COPD, prostate cancer s/p prostatectomy (2019), and GERD who fell down 2 steps and was found to have left hip and femur fracture on 22. He had surgery in [place] and was discharge to SNF [facility] [place] [facility]. He left SNF due to being unhappy with his care there and presented to [facility] on 22 with hip pain. He is admitted to [facility] on 6/3/22 due to functional deficits below his baseline. Today he is seen in his room without family present. He reports hip pain with weight bearing. He otherwise denies acute complaints. He is highly motivated to work with therapy. \"\"    Pain:  Pain Assessment  Pain Assessment: pt reports hip pain; RN aware and administered pain meds    Assessment:  Speech Diagnosis: mild dysarthria   Pt noted to have mild dysarthria in conversation characterized by fast rate and imprecise articulation. Pt ~85% intelligible in conversation. Dysarthria likely d/t pt not wearing partial dentures. Cognitive Diagnosis: severe cognitive impairment  Pt admitted following a fall s/p hip and femur fx. OT observed pt disoriented and confused during session and requested SLP consult to assess cognitive functioning. During SLP eval, pt oriented x3 (intermittently oriented to situation) but appeared confused. Noted pt hallucinating at times (OT/PT also noticed and notified MD). Pt required repetitions and explanations of questions to understand during session.  Pt constantly going off topic and forgetting what was asked. Pt required several redirects back to topic/task. Pt able to state why he's in the hospital. Pt lives in an apartment w/ wife and is an active . Pt reports he is independent w/ medications; states he leaves meds out in bathroom to remember to take them. Pt reports wife handles finances, cooking and cleaning. 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 OT administered the [facility] Cognitive Assessment (MoCA) and pt scored a 14/30, which is indicative of a severe cognitive impairment. Pt's areas of strength include naming, immediate recall and serial subtraction. Pt showed difficulty w/ visuospatial/executive functions, delayed recall, attention, language, abstraction, and orientation. [place] Cognitive Assessment (MoCA)    Section Subtest Score Comments   Visuospatial/ Executive [place] making 0/1     Copy Cube 0/1     Clock 2/3    Naming Picture naming 3/3    Attention Number repetition 1/2     Selective attention 0/1     Serial 7s 2/3    Language Repetition 1/2     Fluency 0/1    Abstraction Comparisons 0/2    Delayed Recall Recall 5 novel words 2/5    Orientation Spatial and Temporal 3/6     Total: 14      Pt was administered portions of the Assessment of Language-Related Functional Activities (TORY). Pt showed difficulty w/ time, counting money and solving math problems. Pt w/ poor thought organization when attempting to give answers. Noted pt actively falling asleep during tasks, requiring cues to remain awake, which likely impacted pt's attention and cognitive functioning during tasks. Telling time: 5/10  Counting money: 4/10  Solving daily math problems: 2/4    Pt presents with severe cognitive impairment and would benefit from ST services to improve overall cognitive functioning for safe return to PLOF, specifically executive functioning, memory, attention and thought organization. Pt is agreeable to tx.     Recommendations:  Requires SLP Intervention: Yes  Duration of Treatment: 5x/week    Plan:   Goals:  Short-term Goals  Timeframe for Short-term Goals: 14 days (6/18/22)  Goal 1: Pt will complete graded recall tasks using compensatory strategies with 80% acc given mod cues. Goal 2: Pt will complete executive function tasks (meds, money, math, time, etc) with 80% acc given mod cues. Goal 3: Pt will complete verbal and visual reasoning tasks with 80% acc given mod cues  Goal 4: Pt will complete problem solving and thought organization tasks with 80% acc given mod cues  Goal 5: Pt will complete graded attention tasks (e.g. sustained, selective, alternative, divided) with 80% acc given min cues  Long-term Goals  Timeframe for Long-term Goals: 21 days (6/25/22)  Goal 1: Pt will improve overall cognitive-linguistic functioning for safe return to PLOF. Patient/family involved in developing goals and treatment plan: yes    Subjective:   Previous level of function and limitations: ind  General  Chart Reviewed: Yes  Subjective  Subjective: Pt sitting upright in bedside chair. 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  Vision  Vision: Impaired  Vision Exceptions: Wears glasses at all times  Hearing  Hearing: Exceptions to [facility]  Hearing Exceptions: Hard of hearing/hearing concerns;Bilateral hearing aid      Objective:     Auditory Comprehension  Comprehension: Within Functional Limits    Expression  Primary Mode of Expression: Verbal    Verbal Expression  Verbal Expression: Within functional limits    Cognition:      Orientation  Overall Orientation Status: Within Functional Limits  Attention  Attention: Exceptions to [facility]  Alternating Attention:  (to be assessed in tx)  Divided Attention:  (to be assessed in tx)  Selective Attention: (to be assessed in tx)  Sustained Attention: Moderate  Memory  Memory: Exceptions to [facility] [place]  Daily Routines:  (to be assessed in tx)  Short-term Memory: Moderate  Working Memory: Moderate  Problem Solving  Problem Solving: Exceptions to [place] [place]  Simple Functional Tasks:  (to be assessed in tx)  Verbal Reasoning Skills:  (to be assessed in tx)  Executive Function Skills: Moderate  Managing Finances:  (to be assessed in tx)  Managing Medications:  (to be assessed in tx)  Numeric Reasoning  Numeric Reasoning: Exceptions to [place] [place]   Calculations:  (to be assessed in tx)  Money Management:  (to be assessed in tx)  Time:  (to be assessed in tx)  Abstract Reasoning  Abstract Reasoning: Exceptions to [place]  Convergent Thinking:  (to be assessed in tx)  Divergent Thinking:  (to be assessed in tx)  Safety/Judgment  Safety[facility]Judgment: [facility]  Complex Functional Tasks:  (to be assessed in tx)  Insight: Moderate      Additional Assessments: N/A    Prognosis:  Speech Therapy Prognosis  Prognosis: Guarded  Prognosis Considerations: Severity of Impairments  Individuals consulted  Consulted and agree with results and recommendations: Patient    Education:  Patient Education: Pt educated on results of evaluation and recommendations for tx. Patient Education Response: Verbalizes understanding; No evidence of learning  Safety Devices in place: Yes  Type of devices: Left in chair;Call light within reach    Therapy Time:   Individual Concurrent Group Co-treatment   Time In 1150         Time Out [address] [facility]  [address] [facility]  Speech Language Pathologist

## 2022-06-04 NOTE — PLAN OF CARE
Problem: Pain  Goal: Verbalizes/displays adequate comfort level or baseline comfort level  Outcome: Progressing     Problem: Skin/Tissue Integrity  Goal: Absence of new skin breakdown  Description: 1.   Monitor for areas of redness and/or skin breakdown    Problem: Safety - Adult  Goal: Free from fall injury  Outcome: Progressing     Problem: Nutrition Deficit:  Goal: Optimize nutritional status  Outcome: Progressing   Outcome: Progressing

## 2022-06-04 NOTE — CONSULTS
Comprehensive Nutrition Assessment    Type and Reason for Visit:  Initial,Consult    Nutrition Recommendations/Plan:   1. Continue carb control diet   2. Obtain updated weight   3. Monitor nutrition adequacy, pertinent labs, bowel habits, wt changes, and clinical progress     Malnutrition Assessment:  Malnutrition Status: At risk for malnutrition (Comment) (06/04/22 1304)    Context:  Acute Illness       Nutrition Assessment:    New ARU pt admitted s/p fall with left hip and femur fractures. S/p surgery. Consulted for oral nutrition supplements. Pt nutritionally at risk AEB fair appetite. Pt ordered carb control diet with PO intakes of % this admission. Pt reports UBW of 220 lb, CBW is stated. Encouraged PO intakes. Declined ONS. Requests bananas with meals. Will monitor. Nutrition Related Findings:    Labs reviewed. -220 since admission. Active BS. Wound Type: Surgical Incision       Current Nutrition Intake & Therapies:    Average Meal Intake: %  Average Supplements Intake: None Ordered  ADULT DIET; Regular; 5 carb choices (75 gm/meal)    Anthropometric Measures:  Height: 5' 8\" (172.7 cm)  Ideal Body Weight (IBW): 154 lbs (70 kg)       Current Body Weight: 224 lb (101.6 kg), 145.5 % IBW. Weight Source: Stated  Current BMI (kg/m2): 34.1  Usual Body Weight: 220 lb (99.8 kg) (per pt)  % Weight Change (Calculated): 1.8                    BMI Categories: Obese Class 1 (BMI 30.0-34. 9)    Estimated Daily Nutrient Needs:  Energy Requirements Based On: Kcal/kg (25-30)  Weight Used for Energy Requirements: Ideal  Energy (kcal/day): 4360-0987 kcal  Weight Used for Protein Requirements: Ideal  Protein (g/day): 70-84 g  Method Used for Fluid Requirements: 1 ml/kcal  Fluid (ml/day): 2752-1041 mL    Nutrition Diagnosis:   · Increased nutrient needs related to increase demand for energy/nutrients as evidenced by  (increased therapy regimen, s/p surgery)      Nutrition Interventions:   Food and/or Nutrient Delivery: Continue Current Diet  Nutrition Education/Counseling: No recommendation at this time  Coordination of Nutrition Care: Continue to monitor while inpatient  Plan of Care discussed with: Patient    Goals:     Goals: PO intake 50% or greater,prior to discharge       Nutrition Monitoring and Evaluation:   Behavioral-Environmental Outcomes: None Identified  Food/Nutrient Intake Outcomes: Food and Nutrient Intake  Physical Signs/Symptoms Outcomes: Biochemical Data,Nutrition Focused Physical Findings,Weight    Discharge Planning:    Continue current diet     [address], [address],   Contact: [phone]

## 2022-06-04 NOTE — PROGRESS NOTES
Physical Therapy  Facility/Department: [facility]  Rehabilitation Physical Therapy Initial Assessment    NAME: Arlette Yeager  : 1945 (78 y.o.)  MRN: [id number]  CODE STATUS: Full Code    Date of Service: 22      Past Medical History:   Diagnosis Date    Diabetes mellitus ([facility] [address]     Hypertension      No past surgical history on file. Chart Reviewed: Yes  Patient assessed for rehabilitation services?: Yes  Additional Pertinent Hx: Per Dr. Eddy Santacruz H&P \"67 year old male with a past medical history significant for DM2, HTN, HLD, BERNARDO, COPD, prostate cancer s/p prostatectomy (2019), and GERD who fell down 2 steps and was found to have left hip and femur fracture on 22. He had surgery in [place] and was discharge to SNF in [facility]. He left SNF due to being unhappy with his care there and presented to [facility] on 22 with hip pain\"  Referring Practitioner: Haroldo Powell MD  Referral Date : 22  Diagnosis: Fall, L hip fx s/p IMN  General Comment  Comments: RN cleared pt for session    Restrictions:  Restrictions/Precautions: Weight Bearing;General Precautions; Fall Risk  Lower Extremity Weight Bearing Restrictions  Left Lower Extremity Weight Bearing: Weight Bearing As Tolerated     SUBJECTIVE  Subjective: Pt resting in bed on approach, reports pain to L hip but agreeable to PT evaluation. Pt hallucinating throughout session.  Reports seeing his wife and grandson  Pain: Pt reports 7/10 pain to L hip              Prior Level of Function:  Social/Functional History  Lives With: Spouse  Type of Home: Apartment [place] apartments)  Home Layout: One level  Home Access: Level entry  Bathroom Shower/Tub: Walk-in shower,Shower chair with back  Bathroom Toilet: Standard  Bathroom Equipment: Shower chair  Home Equipment: Cane,Walker, rolling,Walker, 4 wheeled,Electric scooter  Has the patient had two or more falls in the past year or any fall with injury in the past year?: Yes (About 4 in last year)  ADL Assistance: Independent  Homemaking Assistance: Independent  Cleaning Responsibility: Secondary  Bill Paying/Finance Responsibility: Secondary  Shopping Responsibility: Secondary  Ambulation Assistance: Independent (Reports used electric scooter to get to mailbox but able to ambulate community distances without AD)  Transfer Assistance: Independent  Active : Yes  Occupation: Volunteer work  Type of Occupation: 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 Pt reports that spouse works 4-5 days/week. OBJECTIVE  Vision  Vision: Impaired  Vision Exceptions: Wears glasses for reading    Hearing  Hearing: Exceptions to [facility]  Hearing Exceptions: Hard of hearing/hearing concerns;Bilateral hearing aid         ROM       Strength  Strength RLE  Strength RLE: Exception  R Hip Flexion: 4-/5  R Hip ABduction: 4-/5  R Hip ADduction: 4-/5  R Knee Flexion: 4-/5  R Knee Extension: 4-/5  R Ankle Dorsiflexion: 4-/5  R Ankle Plantar flexion: 4-/5  Strength LLE  Strength LLE: Exception  L Hip Flexion: 2+/5  L Hip ABduction: 2+/5  L Hip ADduction: 2+/5  L Knee Flexion: 3/5  L Knee Extension: 2+/5  L Ankle Dorsiflexion: 3-/5  L Ankle Plantar Flexion: 3-/5           Functional Mobility  Bed mobility  Rolling to Left: Maximum assistance  Rolling to Right: Maximum assistance  Supine to Sit: Maximum assistance (HOB flat, without BR, increased time to complete, cues for sequence, heavy R posterior lean and multiple LOB)  Sit to Supine:  Moderate assistance (HOB flat, without BR, modA for BLE, increased time to complete, cues for sequence)  Transfers  Sit to Stand: Maximum Assistance;Dependent/Total  Stand to sit: Maximum Assistance;Dependent/Total  Bed to Chair: Dependent/Total  Car Transfer: Unable to assess (Unsafe to attempt, requires maxA for standing in RW for max of 5 seconds with B knee flexion, unsafe to attempt pivot into car and unable to safely complete Moderate assistance  Wheelchair Parts Management: Yes  Left Brakes Level of Assistance: Moderate assistance  Right Brakes Level of Assistance: Moderate assistance  Propulsion: Yes  Propulsion 1  Propulsion: Manual  Level: Level Tile  Method: RUE;LUE  Level of Assistance: Minimal assistance  Description/ Details: Completed over level tile, cues and [place] for turns intially, rCistobal to maintain in straight forward progression. Increased time to complete with quick fatigue. Distance: 150'    PT Exercises  Exercise Treatment: Seated BLE exercises: AP x 15, LAQ R (2#) x 15, LLE x 10; marches BLE x 15 (2#R); hip abduction B x 15, GS x 15    ASSESSMENT  Vitals  Heart Rate: 75  Heart Rate Source: Monitor  BP: (!) 123/58  BP Location: Right upper arm  Patient Position: Semi fowlers  MAP (Calculated): 79.67  SpO2: 92 %  O2 Device: None (Room air)    Activity Tolerance  Activity Tolerance: Patient limited by fatigue;Patient limited by pain; Patient limited by endurance;Treatment limited secondary to decreased cognition  Activity Tolerance Comments: Pt significantly limited by L hip pain with all mobility. Pt also hallucinating throughout session, reports seeing his wife and grandson who are not present. OT notified RN and MD of hallucinations earlier this date. Assessment  Assessment: Pt is a 78 y.o. male admitted to [facility] secondary to fall with L hip fx s/p IMN. Pt intially injured in [place] and was d/c to SNF but left d/t not liking care and was admitted to ARU from [facility]. Per chart pt is WBAT to LLE. Pt is questionable historian, pleasantly confused and noted hallucinations throughout session (RN and MD aware). Pt reports he lives with wife in one level apartment and is typically I with functional mobility and gait without AD, intermittent use of electric scooter for community mobility.  Pt is currently functioning well below his stated baseline requiring maxA for bed mobility, maxA to TD for t/f with RW and unable to safely pivot d/t pain and weakness requiring use of // bars or stedy for further mobility. Pt demosntrates poor seated balance with heavy R posterior lean and multiple LOB up to maxA to correct. Pt is able to ambulate max of 2 steps in // bars with maxA and maintains B knee flexion and posterior lean despite cues to correct. Pt also presents with decreased strength, balance, activity tolerance and slow motor planning/processing. Pt requires frequent breaks d/t pain and cues to re-direct to task. Pt will benefit from continued skilled PT in ARU to address above deficits. Recommend pt d/c home with 24hrA and HHPT. CTA for DME needs pt may require manual w/c. Would recommend family training prior to d/c as well. Treatment Diagnosis: Impaired balance and gait  Therapy Prognosis: Good  Decision Making: Medium Complexity  Barriers to Learning: Cognition, hearing, slow processing/sequencing and poor insight  Discharge Recommendations: 24 hour supervision or assist;Home with Home health PT  PT D/C Equipment  Wheelchair: Standard  Other: CTA, pt may require manual w/c with cushion, pending progression of mobility and gait, pt reports he owns RW, 0QN and electric scooter for community  PT Equipment Recommendations  Equipment Needed: Yes  Mobility Devices: Wheelchair  Wheelchair: Standard  Other: CTA, pt may require manual w/c with cushion, pending progression of mobility and gait, pt reports he owns RW, [id number] and electric scooter for community    CLINICAL IMPRESSION   Pt is a 78 y.o. male admitted to [facility] secondary to fall with L hip fx s/p IMN. Pt intially injured in [place] and was d/c to SNF but left d/t not liking care and was admitted to ARU from [facility]. Per chart pt is WBAT to LLE. Pt is questionable historian, pleasantly confused and noted hallucinations throughout session (RN and MD aware).  Pt reports he lives with wife in one level apartment and is typically I with functional mobility and gait without AD, intermittent use of electric scooter for community mobility. Pt is currently functioning well below his stated baseline requiring maxA for bed mobility, maxA to TD for t/f with RW and unable to safely pivot d/t pain and weakness requiring use of // bars or stedy for further mobility. Pt demosntrates poor seated balance with heavy R posterior lean and multiple LOB up to maxA to correct. Pt is able to ambulate max of 2 steps in // bars with maxA and maintains B knee flexion and posterior lean despite cues to correct. Pt also presents with decreased strength, balance, activity tolerance and slow motor planning/processing. Pt requires frequent breaks d/t pain and cues to re-direct to task. Pt will benefit from continued skilled PT in ARU to address above deficits. Recommend pt d/c home with 24hrA and HHPT. CTA for DME needs pt may require manual w/c. Would recommend family training prior to d/c as well. GOALS  Patient Goals   Patient goals :  \"Get stronger so I can get outside and be able to do things I on my own\"  Short Term Goals  Time Frame for Short term goals: 10 days 6/12/22  Short term goal 1: Pt will complete supine to/from sit with Cristobal  Short term goal 2: Pt will complete sit to/from stand with RW with Cristobal  Short term goal 3: Pt will complete bed <> chair with RW with modA  Short term goal 4: Pt will ambulate x 15' with RW with modA without LOB  Long Term Goals  Time Frame for Long term goals : 21 days 6/23/22  Long term goal 1: Pt will complete supine to/from sit with SBA  Long term goal 2: Pt will complete functional t/f with RW with CGA  Long term goal 3: Pt will ambulate x 48' with RW with CGA without LOB  Long term goal 4: Pt will complete car t/f with RW and CGA    PLAN OF CARE  Frequency: 1-2 treatment sessions per day, 5-7 days per week  Plan  Plan: 5-7 times per week  Plan weeks: 3 weeks  Specific Instructions for Next Treatment: Progress mobility as tolerated  Current Treatment Recommendations: Strengthening; Wheelchair mobility training;Equipment evaluation, education, & procurement;Balance training;Gait training;Pain management; Modalities;Stair training;Functional mobility training;Transfer training;Neuromuscular re-education;Home exercise program;Positioning; Safety education & training;Manual Therapy - Soft Tissue Mobilization;Patient/Caregiver education & training; Therapeutic activities; Endurance training  Safety Devices  Type of Devices: All fall risk precautions in place; Bed alarm in place;Call light within reach; Patient at risk for falls;Gait belt;Nurse notified; Left in chair;Chair alarm in place    EDUCATION  Education  Education Given To: Patient  Education Provided: Role of Therapy;Plan of Care;Precautions; Safety; Fall Prevention Strategies;DME/Home Modifications;Transfer Training;Equipment;ADL Function;Cognition;Mobility Training  Education Provided Comments: Improtance of OOB mobility, progression of activity, safe use of AD and lift equipment, healing process following fx and importance of activity and WB. Education Method: Demonstration;Verbal  Barriers to Learning: Cognition; Hearing  Education Outcome: Continued education needed;Verbalized understanding; Unable to demonstrate understanding    ELOS:   Plan weeks: 3 weeks      Therapy Time   Individual Concurrent Group Co-treatment   Time In 1000         Time Out 1130         Minutes 90           Timed Code Treatment Minutes: 75 Minutes (15 minutes for eval)       Kae Kim PT, DPT C/NDT 06/04/22 at 12:03 PM

## 2022-06-04 NOTE — PROGRESS NOTES
Speech Language Pathology    Speech Language Pathology  Clinical Bedside Swallow Assessment  Facility/Department: [facility]      Recommendations:  Diet recommendation: IDDSI 7 Regular Solids; IDDSI 0 Thin Liquids; Meds PO as tolerated  Instrumentation: not clinically indicated  Risk management: upright for all intake, stay upright for at least 30 mins after intake, small bites/sips, alternate bites/sips and slow rate of intake    NAME:Saravanan Young  : 1945 (78 y.o.)   MRN: [id number]  ROOM: [address] [place] [address]  ADMISSION DATE: 6/3/2022  PATIENT DIAGNOSIS(ES): S/p left hip fracture [Z87.81]  No chief complaint on file. Patient Active Problem List    Diagnosis Date Noted    S/p left hip fracture 2022     Past Medical History:   Diagnosis Date    Diabetes mellitus ([address]     Hypertension      No past surgical history on file. Allergies   Allergen Reactions    Cipro [Ciprofloxacin Hcl] Hives    Lisinopril Other (See Comments)     cough    Metformin And Related Diarrhea    Penicillins      DATE ONSET: 22    Date of Evaluation: 2022   Evaluating Therapist: Brynn Magaña SLP    Chart Reviewed: : [x] Yes [] No    Current Diet: ADULT DIET; Regular; 5 carb choices (75 gm/meal)    Recent Chest Radiography: N/A    Pain: hip pain; RN aware and administered meds    Reason for Referral  Malou Vazquez was referred for a bedside swallow evaluation to assess the efficiency of their swallow function, identify signs and symptoms of aspiration and make recommendations regarding safe dietary consistencies, effective compensatory strategies, and safe eating environment. Assessment    Medical record review/interview: Per MD H&P: \"\"Saravanan Bill is a 78year old male with a past medical history significant for DM2, HTN, HLD, BERNARDO, COPD, prostate cancer s/p prostatectomy (2019), and GERD who fell down 2 steps and was found to have left hip and femur fracture on 22.  [place] had surgery in [place]. He left SNF due to being unhappy with his care there and presented to [facility] on 5/29/22 with hip pain. He is admitted to [facility] on 6/3/22 due to functional deficits below his baseline. Today he is seen in his room without family present. He reports hip pain with weight bearing. He otherwise denies acute complaints. He is highly motivated to work with therapy. \"\"    Predisposing dysphagia risk factors: Cognitive Deficit and Age  Clinical signs of possible chronic dysphagia: N/A  Precipitating dysphagia risk factors: N/A    Patient Complaints:  Odynophagia: [] Yes [x] No  Globus Sensation: [] Yes [x] No  SOB with PO intake: [] Yes [x] No  Increased WOB with PO intake: [] Yes [x] No  Reflux Sx's: [] Yes [x] No  Weight loss: [] Yes [x] No  Coughing/Choking with PO intake: [] Yes [x] No  Reduced Appetite: [] Yes [x] No    Pt's goals: To get well enough to travel    Vitals/labs:   Temp: N/A  SpO2: 96%  RR: 16  BP: 123/69  HR: 87    CBC: No results for input(s): WBC, HGB, PLT in the last 72 hours.    BMP:  Recent Labs     06/03/22  1441      K 4.0   CL 99   CO2 29   BUN 13   CREATININE 0.8   GLUCOSE 142*        Cranial nerve exam:   CN V (trigeminal): ophthalmic, maxillary, and mandibular facial sensation- WFL  CN VII (facial): WFL  CN IX/X (glossopharyngeal/vagus): vocal quality: WFL; cough: Strong-perceptually  CN XII (hypoglossal): Reduced    Laryngeal function exam:   Vocal quality: See CN exam above  MPT: See CN exam above  Pitch range: See CN exam above  Cough: See CN exam above    Oral Care Status:    [x] Oral Care [facility]  [] Poor oral care status  [] Edentulous  [] Upper Dentures  [] Lower Dentures  [x] Missing/Broken Teeth  [] Evidence of dental cavities/carries    PO trials:   IDDSI 0 (thin): via straw x5; no overt s/s dysphagia    IDDSI 4 (puree): x3; no overt s/s dysphagia    IDDSI 6 (soft and bite sized/mixed): x10; prolonged mastication/bolus manipulation, trace-mild oral residue, delayed cough x2    3 oz water: DNT    Impressions:  Pt received sitting upright w/ lunch tray. OT observed pt coughing w/ breakfast and requested SLP consult to fully assess swallow function. Pt w/ no overt s/s dysphagia w/ thin. Pt w/ prolonged mastication/bolus manipulation w/ soft solids; pt often talking while masticating, required cues to finish masticating before talking. Pt w/ trace-mild oral residue w/ soft solids; pt appeared unaware of residue. Residue cleared when pt cued to take a drink and swish. Noted pt w/ delayed cough x2 w/ soft solids. Recommend pt remain on regular diet w/ thin liquids and alternate liquids and solids to reduced oral residue. Pt demonstrated understanding of compensatory strategy but will likely need cues to implement. ST will follow for diet tolerance and use of compensatory strategies. Recommendations:  Diet recommendation: IDDSI 7 Regular Solids; IDDSI 0 Thin Liquids;  Meds PO as tolerated  Instrumentation: not clinically indicated  Risk management: upright for all intake, stay upright for at least 30 mins after intake, small bites/sips, alternate bites/sips and slow rate of intake    Prognosis: Fair - Good    Recommended Intervention:   [x] Dysphagia tx  [] Videostroboscopy                      [] NPO   [] MBS       [] Speech/Cog Eval    [] Therapeutic PO Trials     [] Ice Chips   [] Other:  [] FEES                                                 Dysphagia Therapeutic Intervention:   []  Bolus control Exercises  []  Oral Motor Exercises  []  [employer] Protocol  []  Thermal Stimulation  []  Oral Care    []  Vital Stim/NMES  []  Laryngeal Exercises  []  Patient/Family Education  []  Pharyngeal Exercises  []  Therapeutic PO trials with SLP  [x]  Diet tolerance monitoring  []  Other:     Referrals:  [] ENT    [] PT  [] Pulmonology [] GI  [] Neurology  [] RD  [] OT   []     Goals:  Short Term Goals:  Timeframe for Short Term Goals: (14 days (6/18/22)  Goal 1: Pt will tolerate recommended diet w/o observed clinical signs of aspiration. Goal 2: Pt/caregiver will demonstrate understanding of compensatory strategies for improved swallowing safety. Long Term Goals:   Timeframe for Long Term Goals: (21 days 6/25/22)  Goal 1: The patient will tolerate least restrictive diet with no clinical s/s of aspiration or worsening respiratory/pulmonary status    Treatment:  Skilled instruction completed with patient re: evidenced based practice regarding recommendations and POC, importance of oral care to reduce adverse affects in the event of aspiration, and instruction of recommended compensatory strategies developed based upon clinical exam. Pt able to recall/demonstrate compensatory strategies with min cues. Pt Education: SLP educated the patient re: Role of SLP, rationale for completion of assessment, results of assessment and recommendations  Pt Education Response: verbalized understanding, no evidence of learning and would benefit from ongoing education    Duration/Frequency of Tx: 5x/week    Individuals Consulted:   [x]  Patient     []  NP         []  RN   []  RD                   []  MD      []  Family Member                        []  PA    []  Other:      Safety Devices / Report:  []  All fall risk precautions in place []  Safety handoff completed with RN  []  Bed alarm in place  []  Left in bed     []  Chair alarm in place  [x]  Left in chair   [x]  Call light in reach   []  Other: Total Treatment Time / Charges       Time in Time out Total Time / units   Swallow Eval/Tx Time  1130 1150 20 min / 1 unit     Signature:  Kimberli Wolfe M.A.  [id number] [facility]  Speech Language Pathologist

## 2022-06-04 NOTE — PLAN OF CARE
Bedside swallow evaluation completed. Lima Davis M.A. CCC-SLP  Speech Language Pathologist      Cognitive-linguistic evaluation completed. Lima Davis M.A.  [facility]  Speech Language Pathologist

## 2022-06-04 NOTE — PROGRESS NOTES
Occupational Therapy  Facility/Department: [facility] ARU  Rehabilitation Occupational Therapy Evaluation       Date: 22  Patient Name: Veda Hook       Room: 8684/4624-78  MRN: [id number]  Account: [de-identified]   : 1945  (78 y.o.) Gender: male     Referring Practitioner: Diego Daniel MD  Diagnosis: s/p L femur fracture  Additional Pertinent Hx: DM, HTN, BERNARDO, COPD, prostate CA, GERD; L hip/femur fracture and repair 22, admitted to SNF, not satisfied with care, discharged self and arrived at [facility] ED    Restrictions  Restrictions/Precautions: Weight Bearing;General Precautions; Fall Risk  Left Lower Extremity Weight Bearing: Weight Bearing As Tolerated  Equipment Used: Bed;Other    Subjective  Subjective: Pt in bed, eating breakfast, coughing at times, confused, agreeable to OT evaluation. Vitals  Temp: 97.9 °F (36.6 °C)  Heart Rate: 75  Resp: 16  BP: (!) 123/58  Oxygen Therapy  SpO2: 92 %  Pulse Oximetry Type: Intermittent  Pulse Oximeter Device Location: Finger  O2 Device: None (Room air)  Level of Consciousness: Alert (0)    Objective  Vision - Basic Assessment  Prior Vision: Wears glasses only for reading  Visual History: No significant visual history  Patient Visual Report: Balance difficulty  Visual Field Cut: Not tested  Cognition  Overall Cognitive Status: Exceptions  Arousal/Alertness: Appropriate responses to stimuli  Following Commands: Follows one step commands with repetition; Inconsistently follows commands  Attention Span: Attends with cues to redirect  Memory: Decreased recall of recent events;Decreased short term memory;Decreased recall of precautions  Safety Judgement: Decreased awareness of need for safety;Decreased awareness of need for assistance  Problem Solving: Decreased awareness of errors  Insights: Not aware of deficits  Initiation: Requires cues for some  Sequencing: Requires cues for all  Orientation  Overall Orientation Status: Impaired  Orientation Level: Oriented to time;Oriented to situation;Oriented to person;Disoriented to place   Perception  Overall Perceptual Status: Impaired  Unilateral Attention: Cues to maintain midline in sitting;Cues to maintain midline in standing  Initiation: Cues to initiate tasks  Motor Planning: Cues to use objects appropriately  Perseveration: Perseverates during conversation  Sensation  Overall Sensation Status: Impaired  Light Touch: Partial deficits in the RUE;Partial deficits in the LUE   ROM  LUE AROM (degrees)  LUE AROM : WFL  Left Hand AROM (degrees)  Left Hand AROM: WFL  RUE AROM (degrees)  RUE AROM : WFL  Right Hand AROM (degrees)  Right Hand AROM: WFL  LUE Strength  Gross LUE Strength: Exceptions to [facility]  L Shoulder Flex: 3+/5  L Shoulder Horiz ABduction: 4-/5  L Shoulder Horiz ADduction: 4-/5  L Elbow Flex: 4-/5  L Elbow Ext: 4-/5  L Wrist Flex: 4-/5  L Wrist Ext: 4-/5  L Hand General: 4-/5  RUE Strength  Gross RUE Strength: Exceptions to [facility]  R Shoulder Flex: 4-/5  R Shoulder Horiz ABduction: 4-/5  R Shoulder Horiz ADduction: 4-/5  R Elbow Flex: 4/5  R Elbow Ext: 4/5  R Wrist Flex: 4/5  R Wrist Ext: 4/5  R Hand General: 4/5  Fine Motor Skills  Coordination  Movements Are Fluid And Coordinated: No  Coordination and Movement Description: Fine motor impairments; Left UE       Hand Assessment  Hand Dominance  Hand Dominance: Right (uses L hand to write, R hand for most other tasks)  Functional Mobility  Balance  Sitting Balance: Maximum assistance  Standing Balance: Dependent/Total  Standing Balance  Time: 10-15 seconds x7  Activity: sit<>stand to RW briefly, sit<>stands to [employer], standing briefly in shower with BUE support on grab bars  Comment: heavy R lateral lean in all standing and sitting  Transfers  Stand Step Transfers: Dependent/Total (mod A with [place]a [place])  Sit Pivot Transfers: Maximum assistance (max A w/c>bed on R at end of session with 2 attempts and nearly missing bed, pt insisted on technique)  Sit to stand: Maximum assistance  Stand to sit: Moderate assistance  Toilet Transfers  Equipment Used: Standard toilet  Toilet Transfer: Dependent/Total  Toilet Transfers Comments: use of Tamica Donato  Shower Transfers  Shower - Transfer From: Other  Shower - Transfer Type: To and From  Shower - Transfer To: Transfer tub bench  Shower Transfers: Dependent  Shower Transfers Comments: use of Lj Paris  Wheelchair Marsh & Carlos Used: Bed;Other  Level of Asssistance: Dependent/Total  Wheelchair Transfers Comments: max PHILIP in Lj Paris out of bed, sit pivot w/c>bed at end of session with max A. Social/Functional History  Lives With: Spouse  Type of Home: Apartment  Home Layout: One level  Home Access: Level entry  Bathroom Shower/Tub: Walk-in shower; Shower chair with back  Bathroom Toilet: Standard  Home Equipment: Cane;Walker, rolling;Walker, 4 wheeled (typically does not use AD around apt, unsure if he has w/c)  Has the patient had two or more falls in the past year or any fall with injury in the past year?: Yes (about 4 in last year, \"I typically get light headed and lean backward and I'm done\")  ADL Assistance: Independent  Homemaking Assistance: Independent  Homemaking Responsibilities: Yes  Meal Prep Responsibility: No  Laundry Responsibility: No  Cleaning Responsibility: Secondary  Bill Paying/Finance Responsibility: Secondary  Shopping Responsibility: Secondary  Ambulation Assistance: Independent  Transfer Assistance: Independent  Active : Yes  Occupation: Volunteer work  Type of Occupation: 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 Pt reports that spouse works 4-5 days/week.   ADL  Feeding: Supervision;Setup  Feeding Skilled Clinical Factors: coughing at times, setup to open packages  Grooming: Stand by assistance  Grooming Skilled Clinical Factors: while seated at sink to brush teeth and comb hair, cues for initiation  UE Bathing: Maximum assistance;Verbal cueing  UE Bathing Skilled Clinical Factors: for posture during bathing due to R lateral lean, assist to rinse underarms  LE Bathing: Maximum assistance;Verbal cueing  LE Bathing Skilled Clinical Factors: assist to bathe lower legs and feet, mod A for balance during standing to bathe buttocks  UE Dressing: Dependent/Total  UE Dressing Skilled Clinical Factors: assist to thread BUEs into shirt and pull down in back/front  LE Dressing: Dependent/Total  LE Dressing Skilled Clinical Factors: total assist to thread BLEs into brief/shorts and mod A to  Chang Sous with total assist to manage clothing over hips in stance, total assist to don B socks and shoes  Toileting: Dependent/Total  Toileting Skilled Clinical Factors: incontinent of urine during session without being aware he was going    Goals  Patient Goals   Patient goals : \"Get independent and go home\"  Short Term Goals  Time Frame for Short term goals: 1 week- 6/10/22  Short Term Goal 1: Pt will perform functional transfers with max A. Short Term Goal 2: Pt will complete toileting with max A. Short Term Goal 3: Pt will perform LB dressing with mod A. Short Term Goal 4: Pt will complete bathing with mod A. Long Term Goals  Time Frame for Long term goals : 3 weeks- 6/23/22  Long Term Goal 1: Pt will complete functional transfers with CGA. Long Term Goal 2: Pt will perform bathing with CGA. Long Term Goal 3: Pt will complete full body dressing with CGA. Long Term Goal 4: Pt will perform toileting with CGA. Long Term Goal 5: Pt will perform grooming at sink with mod I. Assessment  Performance deficits / Impairments: Decreased functional mobility ; Decreased safe awareness;Decreased balance;Decreased ADL status; Decreased ROM; Decreased strength;Decreased sensation;Decreased endurance;Decreased high-level IADLs;Decreased cognition;Decreased posture  Assessment: Pt presents with the above deficits requiring skilled OT services to return to PLOF s/p left femur fracture. Pt presents with increased confusion this date and poor posture. Pt was unable to sit at EOB without max A with pt falling backward and to R consistently. Pt able to stand briefly at EOB with RW with max A but unable to maintain standing due to posterior lean. Pt required use of Chelsey Pimple for all transfers with mod A to stand and mod/max A for posture in Chelsey Pimple due to heavy posterior lean. Pt required mod A in shower on TTB due to R lateral lean. Pt completed grooming in w/c with SBA and dressing with max A/total assist. Pt incontinent of urine in shower and unaware. Pt required use of Chelsey Pimple for toilet transfers but insisted on sit pivot transfer back to bed. Pt required 3 attempts and max A to transfer to bed. Pt coordination with nose>finger slow on LUE but accurate on both sides. Pt completed MOCA scoring, 14/30. Prognosis: Fair  Decision Making: High Complexity  Discharge Recommendations: 24 hour supervision or assist;Home with Home health OT;S Level 1  Plan  Times per Week: 5/7 days/week  Plan Weeks: 3 weeks  Current Treatment Recommendations: Strengthening;ROM;Balance training;Functional mobility training; Wheelchair mobility training; Safety education & training;Neuromuscular re-education;Cognitive reorientation; Endurance training;Pain management;Self-Care / ADL; Home management training;Cognitive/Perceptual training;Coordination training;Equipment evaluation, education, & procurement;Patient/Caregiver education & training       Therapy Time   Individual Concurrent Group Co-treatment   Time In 0730         Time Out 0915         Minutes 105         Timed Code Treatment Minutes: 90 Minutes (15 minute evaluation)       Martha Aaron OT

## 2022-06-05 LAB
ANION GAP SERPL CALCULATED.3IONS-SCNC: 6 MMOL/L (ref 3–16)
BUN BLDV-MCNC: 14 MG/DL (ref 7–20)
CALCIUM SERPL-MCNC: 8.7 MG/DL (ref 8.3–10.6)
CHLORIDE BLD-SCNC: 102 MMOL/L (ref 99–110)
CO2: 30 MMOL/L (ref 21–32)
CREAT SERPL-MCNC: 0.9 MG/DL (ref 0.8–1.3)
GFR AFRICAN AMERICAN: >60
GFR NON-AFRICAN AMERICAN: >60
GLUCOSE BLD-MCNC: 201 MG/DL (ref 70–99)
GLUCOSE BLD-MCNC: 209 MG/DL (ref 70–99)
GLUCOSE BLD-MCNC: 214 MG/DL (ref 70–99)
GLUCOSE BLD-MCNC: 238 MG/DL (ref 70–99)
GLUCOSE BLD-MCNC: 378 MG/DL (ref 70–99)
HCT VFR BLD CALC: 33.6 % (ref 40.5–52.5)
HEMOGLOBIN: 10.7 G/DL (ref 13.5–17.5)
MCH RBC QN AUTO: 25.2 PG (ref 26–34)
MCHC RBC AUTO-ENTMCNC: 31.7 G/DL (ref 31–36)
MCV RBC AUTO: 79.6 FL (ref 80–100)
PDW BLD-RTO: 16.5 % (ref 12.4–15.4)
PERFORMED ON: ABNORMAL
PLATELET # BLD: 193 K/UL (ref 135–450)
PMV BLD AUTO: 8.2 FL (ref 5–10.5)
POTASSIUM SERPL-SCNC: 4.4 MMOL/L (ref 3.5–5.1)
RBC # BLD: 4.23 M/UL (ref 4.2–5.9)
SODIUM BLD-SCNC: 138 MMOL/L (ref 136–145)
WBC # BLD: 4.4 K/UL (ref 4–11)

## 2022-06-05 PROCEDURE — 36415 COLL VENOUS BLD VENIPUNCTURE: CPT

## 2022-06-05 PROCEDURE — 80048 BASIC METABOLIC PNL TOTAL CA: CPT

## 2022-06-05 PROCEDURE — 85027 COMPLETE CBC AUTOMATED: CPT

## 2022-06-05 PROCEDURE — 1280000000 HC REHAB R&B

## 2022-06-05 PROCEDURE — 6370000000 HC RX 637 (ALT 250 FOR IP): Performed by: STUDENT IN AN ORGANIZED HEALTH CARE EDUCATION/TRAINING PROGRAM

## 2022-06-05 PROCEDURE — 6360000002 HC RX W HCPCS: Performed by: STUDENT IN AN ORGANIZED HEALTH CARE EDUCATION/TRAINING PROGRAM

## 2022-06-05 RX ADMIN — ACETAMINOPHEN 325MG 650 MG: 325 TABLET ORAL at 09:05

## 2022-06-05 RX ADMIN — PANTOPRAZOLE SODIUM 40 MG: 40 TABLET, DELAYED RELEASE ORAL at 05:34

## 2022-06-05 RX ADMIN — ATORVASTATIN CALCIUM 40 MG: 40 TABLET, FILM COATED ORAL at 19:48

## 2022-06-05 RX ADMIN — TRAZODONE HYDROCHLORIDE 50 MG: 50 TABLET ORAL at 19:48

## 2022-06-05 RX ADMIN — OXYCODONE 5 MG: 5 TABLET ORAL at 19:48

## 2022-06-05 RX ADMIN — DOCUSATE SODIUM 50 MG AND SENNOSIDES 8.6 MG 1 TABLET: 8.6; 5 TABLET, FILM COATED ORAL at 09:06

## 2022-06-05 RX ADMIN — TROSPIUM CHLORIDE 20 MG: 20 TABLET, FILM COATED ORAL at 15:00

## 2022-06-05 RX ADMIN — INSULIN LISPRO 1 UNITS: 100 INJECTION, SOLUTION INTRAVENOUS; SUBCUTANEOUS at 19:49

## 2022-06-05 RX ADMIN — AMLODIPINE BESYLATE 10 MG: 5 TABLET ORAL at 09:06

## 2022-06-05 RX ADMIN — VALSARTAN 160 MG: 80 TABLET, FILM COATED ORAL at 09:06

## 2022-06-05 RX ADMIN — GABAPENTIN 300 MG: 300 CAPSULE ORAL at 19:48

## 2022-06-05 RX ADMIN — GABAPENTIN 300 MG: 300 CAPSULE ORAL at 09:07

## 2022-06-05 RX ADMIN — ASPIRIN 81 MG: 81 TABLET, COATED ORAL at 09:07

## 2022-06-05 RX ADMIN — INSULIN LISPRO 2 UNITS: 100 INJECTION, SOLUTION INTRAVENOUS; SUBCUTANEOUS at 11:42

## 2022-06-05 RX ADMIN — INSULIN LISPRO 2 UNITS: 100 INJECTION, SOLUTION INTRAVENOUS; SUBCUTANEOUS at 05:35

## 2022-06-05 RX ADMIN — INSULIN GLARGINE 12 UNITS: 100 INJECTION, SOLUTION SUBCUTANEOUS at 19:48

## 2022-06-05 RX ADMIN — INSULIN LISPRO 5 UNITS: 100 INJECTION, SOLUTION INTRAVENOUS; SUBCUTANEOUS at 16:30

## 2022-06-05 RX ADMIN — TROSPIUM CHLORIDE 20 MG: 20 TABLET, FILM COATED ORAL at 05:34

## 2022-06-05 RX ADMIN — ENOXAPARIN SODIUM 40 MG: 100 INJECTION SUBCUTANEOUS at 09:04

## 2022-06-05 RX ADMIN — POLYETHYLENE GLYCOL 3350 17 G: 17 POWDER, FOR SOLUTION ORAL at 09:07

## 2022-06-05 RX ADMIN — CYANOCOBALAMIN TAB 500 MCG 1000 MCG: 500 TAB at 09:17

## 2022-06-05 RX ADMIN — SERTRALINE 100 MG: 50 TABLET, FILM COATED ORAL at 09:06

## 2022-06-05 ASSESSMENT — PAIN SCALES - GENERAL
PAINLEVEL_OUTOF10: 4
PAINLEVEL_OUTOF10: 7

## 2022-06-05 ASSESSMENT — PAIN DESCRIPTION - LOCATION: LOCATION: SCROTUM;HIP

## 2022-06-05 NOTE — PLAN OF CARE
Problem: Pain  Goal: Verbalizes/displays adequate comfort level or baseline comfort level  Outcome: Progressing  Note: Pt a/o, rates pain appropriately using 0-10 pain scale; pt calls out as needed for pain intervention; will continue to monitor and administer intervention as ordered and requested. Problem: Skin/Tissue Integrity  Goal: Absence of new skin breakdown  Description: 1.   Monitor for areas of redness and/or skin breakdown  Outcome: Progressing     Problem: Safety - Adult  Goal: Free from fall injury  Outcome: Progressing

## 2022-06-06 LAB
ANION GAP SERPL CALCULATED.3IONS-SCNC: 5 MMOL/L (ref 3–16)
BASOPHILS ABSOLUTE: 0 K/UL (ref 0–0.2)
BASOPHILS RELATIVE PERCENT: 0.6 %
BUN BLDV-MCNC: 14 MG/DL (ref 7–20)
CALCIUM SERPL-MCNC: 8.9 MG/DL (ref 8.3–10.6)
CHLORIDE BLD-SCNC: 103 MMOL/L (ref 99–110)
CO2: 31 MMOL/L (ref 21–32)
CREAT SERPL-MCNC: 0.9 MG/DL (ref 0.8–1.3)
EOSINOPHILS ABSOLUTE: 0.3 K/UL (ref 0–0.6)
EOSINOPHILS RELATIVE PERCENT: 6.9 %
GFR AFRICAN AMERICAN: >60
GFR NON-AFRICAN AMERICAN: >60
GLUCOSE BLD-MCNC: 188 MG/DL (ref 70–99)
GLUCOSE BLD-MCNC: 220 MG/DL (ref 70–99)
GLUCOSE BLD-MCNC: 223 MG/DL (ref 70–99)
GLUCOSE BLD-MCNC: 237 MG/DL (ref 70–99)
GLUCOSE BLD-MCNC: 332 MG/DL (ref 70–99)
HCT VFR BLD CALC: 32.8 % (ref 40.5–52.5)
HEMOGLOBIN: 10.5 G/DL (ref 13.5–17.5)
LYMPHOCYTES ABSOLUTE: 1.8 K/UL (ref 1–5.1)
LYMPHOCYTES RELATIVE PERCENT: 37.5 %
MCH RBC QN AUTO: 25 PG (ref 26–34)
MCHC RBC AUTO-ENTMCNC: 32 G/DL (ref 31–36)
MCV RBC AUTO: 78.3 FL (ref 80–100)
MONOCYTES ABSOLUTE: 0.4 K/UL (ref 0–1.3)
MONOCYTES RELATIVE PERCENT: 8.8 %
NEUTROPHILS ABSOLUTE: 2.2 K/UL (ref 1.7–7.7)
NEUTROPHILS RELATIVE PERCENT: 46.2 %
PDW BLD-RTO: 16.6 % (ref 12.4–15.4)
PERFORMED ON: ABNORMAL
PLATELET # BLD: 203 K/UL (ref 135–450)
PMV BLD AUTO: 8.3 FL (ref 5–10.5)
POTASSIUM REFLEX MAGNESIUM: 5 MMOL/L (ref 3.5–5.1)
RBC # BLD: 4.19 M/UL (ref 4.2–5.9)
SODIUM BLD-SCNC: 139 MMOL/L (ref 136–145)
WBC # BLD: 4.7 K/UL (ref 4–11)

## 2022-06-06 PROCEDURE — 1280000000 HC REHAB R&B

## 2022-06-06 PROCEDURE — 97130 THER IVNTJ EA ADDL 15 MIN: CPT

## 2022-06-06 PROCEDURE — 97129 THER IVNTJ 1ST 15 MIN: CPT

## 2022-06-06 PROCEDURE — 85025 COMPLETE CBC W/AUTO DIFF WBC: CPT

## 2022-06-06 PROCEDURE — 97530 THERAPEUTIC ACTIVITIES: CPT

## 2022-06-06 PROCEDURE — 80048 BASIC METABOLIC PNL TOTAL CA: CPT

## 2022-06-06 PROCEDURE — 6370000000 HC RX 637 (ALT 250 FOR IP): Performed by: STUDENT IN AN ORGANIZED HEALTH CARE EDUCATION/TRAINING PROGRAM

## 2022-06-06 PROCEDURE — 97116 GAIT TRAINING THERAPY: CPT

## 2022-06-06 PROCEDURE — 97535 SELF CARE MNGMENT TRAINING: CPT

## 2022-06-06 PROCEDURE — 6360000002 HC RX W HCPCS: Performed by: STUDENT IN AN ORGANIZED HEALTH CARE EDUCATION/TRAINING PROGRAM

## 2022-06-06 PROCEDURE — 36415 COLL VENOUS BLD VENIPUNCTURE: CPT

## 2022-06-06 RX ORDER — SENNA AND DOCUSATE SODIUM 50; 8.6 MG/1; MG/1
2 TABLET, FILM COATED ORAL 2 TIMES DAILY
Status: DISCONTINUED | OUTPATIENT
Start: 2022-06-06 | End: 2022-06-09

## 2022-06-06 RX ORDER — INSULIN GLARGINE 100 [IU]/ML
15 INJECTION, SOLUTION SUBCUTANEOUS NIGHTLY
Status: DISCONTINUED | OUTPATIENT
Start: 2022-06-06 | End: 2022-06-07

## 2022-06-06 RX ADMIN — AMLODIPINE BESYLATE 10 MG: 5 TABLET ORAL at 08:17

## 2022-06-06 RX ADMIN — GABAPENTIN 300 MG: 300 CAPSULE ORAL at 21:08

## 2022-06-06 RX ADMIN — INSULIN LISPRO 2 UNITS: 100 INJECTION, SOLUTION INTRAVENOUS; SUBCUTANEOUS at 17:08

## 2022-06-06 RX ADMIN — TRAZODONE HYDROCHLORIDE 50 MG: 50 TABLET ORAL at 21:08

## 2022-06-06 RX ADMIN — ENOXAPARIN SODIUM 40 MG: 100 INJECTION SUBCUTANEOUS at 08:17

## 2022-06-06 RX ADMIN — DOCUSATE SODIUM 50 MG AND SENNOSIDES 8.6 MG 2 TABLET: 8.6; 5 TABLET, FILM COATED ORAL at 21:09

## 2022-06-06 RX ADMIN — OXYCODONE 5 MG: 5 TABLET ORAL at 19:21

## 2022-06-06 RX ADMIN — TROSPIUM CHLORIDE 20 MG: 20 TABLET, FILM COATED ORAL at 16:26

## 2022-06-06 RX ADMIN — DOCUSATE SODIUM 50 MG AND SENNOSIDES 8.6 MG 2 TABLET: 8.6; 5 TABLET, FILM COATED ORAL at 08:17

## 2022-06-06 RX ADMIN — INSULIN GLARGINE 15 UNITS: 100 INJECTION, SOLUTION SUBCUTANEOUS at 21:09

## 2022-06-06 RX ADMIN — GABAPENTIN 300 MG: 300 CAPSULE ORAL at 08:16

## 2022-06-06 RX ADMIN — INSULIN LISPRO 1 UNITS: 100 INJECTION, SOLUTION INTRAVENOUS; SUBCUTANEOUS at 05:53

## 2022-06-06 RX ADMIN — TROSPIUM CHLORIDE 20 MG: 20 TABLET, FILM COATED ORAL at 05:53

## 2022-06-06 RX ADMIN — CYANOCOBALAMIN TAB 500 MCG 1000 MCG: 500 TAB at 08:17

## 2022-06-06 RX ADMIN — ATORVASTATIN CALCIUM 40 MG: 40 TABLET, FILM COATED ORAL at 21:08

## 2022-06-06 RX ADMIN — INSULIN LISPRO 1 UNITS: 100 INJECTION, SOLUTION INTRAVENOUS; SUBCUTANEOUS at 21:09

## 2022-06-06 RX ADMIN — PANTOPRAZOLE SODIUM 40 MG: 40 TABLET, DELAYED RELEASE ORAL at 05:53

## 2022-06-06 RX ADMIN — SERTRALINE 100 MG: 50 TABLET, FILM COATED ORAL at 08:17

## 2022-06-06 RX ADMIN — VALSARTAN 160 MG: 80 TABLET, FILM COATED ORAL at 08:16

## 2022-06-06 RX ADMIN — ASPIRIN 81 MG: 81 TABLET, COATED ORAL at 08:16

## 2022-06-06 RX ADMIN — INSULIN LISPRO 4 UNITS: 100 INJECTION, SOLUTION INTRAVENOUS; SUBCUTANEOUS at 11:57

## 2022-06-06 RX ADMIN — POLYETHYLENE GLYCOL 3350 17 G: 17 POWDER, FOR SOLUTION ORAL at 08:17

## 2022-06-06 ASSESSMENT — PAIN SCALES - GENERAL
PAINLEVEL_OUTOF10: 7
PAINLEVEL_OUTOF10: 0
PAINLEVEL_OUTOF10: 9

## 2022-06-06 ASSESSMENT — PAIN DESCRIPTION - LOCATION
LOCATION: HIP
LOCATION: HIP

## 2022-06-06 ASSESSMENT — PAIN DESCRIPTION - DESCRIPTORS
DESCRIPTORS: ACHING
DESCRIPTORS: ACHING

## 2022-06-06 ASSESSMENT — PAIN DESCRIPTION - ORIENTATION
ORIENTATION: LEFT
ORIENTATION: LEFT

## 2022-06-06 ASSESSMENT — PAIN SCALES - WONG BAKER: WONGBAKER_NUMERICALRESPONSE: 2

## 2022-06-06 ASSESSMENT — PAIN DESCRIPTION - ONSET: ONSET: ON-GOING

## 2022-06-06 ASSESSMENT — PAIN DESCRIPTION - FREQUENCY: FREQUENCY: CONTINUOUS

## 2022-06-06 NOTE — PROGRESS NOTES
Physical Therapy  Facility/Department: [facility]  Rehabilitation Physical Therapy Treatment Note    NAME: Arlette Yeager  : 1945 (78 y.o.)  MRN: [id number]  CODE STATUS: Full Code    Date of Service: 22       Restrictions:  Restrictions/Precautions: Weight Bearing;General Precautions; Fall Risk  Lower Extremity Weight Bearing Restrictions  Left Lower Extremity Weight Bearing: Weight Bearing As Tolerated     SUBJECTIVE  Subjective  Subjective: pt in bed, agreeable to therapy  Pain: pt reporting pain at hip and knee        OBJECTIVE  Vitals: 127/76, 96%, 81 bpm following mobility; reporting minor dizziness     Orientation  Overall Orientation Status: Within Normal Limits  Orientation Level: Oriented to person;Oriented to situation;Oriented to time;Oriented to place    Functional Mobility  Supine to Sit  Assistance Level: Minimal assistance (HOB elevated)  Sit to Stand  Assistance Level: Requires x 2 assistance;Minimal assistance (min A x2 progressing to min A with RW)  Stand to Sit  Assistance Level: Minimal assistance (with RW)  Bed To/From Chair  Assistance Level: Minimal assistance; Requires x 2 assistance (min A x1-2 with RW)  Car Transfer  Assistance Level: Requires x 2 assistance;Minimal assistance (min A x2 with RW)      Environmental Mobility  Ambulation  Surface: Level surface  Device: Rolling walker  Assistance Level: Minimal assistance; Requires x 2 assistance (min A x2 progressing to min A)  Gait Deviations: Slow sarita     Pt ambulates 1x 25 ft and 1x 45 ft with min A x2 progressing to min A for majority of ambulation. Pt does require up to max A x2 to recover when fatigued from LLE buckling. Pt ambulates additional 3x 10 ft while retrieving cones from knee/waist height with RW and mostly min A progressing to max A x2 with LOB's. Pt loses balance with narrow STEVEN and when turning/backing up that improves somewhat with cueing to widen STEVEN and slow down when turning.      Pt steps up onto scale with min A x2 and cueing for safety with BUE support. ASSESSMENT/PROGRESS TOWARDS GOALS  Assessment  Assessment: Patient seen as co-treat for gait and transfers given pt performance on evaluation. Although pt demos significant improvement in balance and mobility from evaluation, pt continues to benefit from 2 skilled hands given intermittent knee buckling without warning. Patient completed transfers with min A x2 progressing to min A with cues for use of RW. Pt ambulates up to 39 ft with RW and mostly min A (up to max A x2 d/t LOB with narrowed STEVEN, buckling). Pt will continue to benefit from skilled PT in ARU to progress strength, endurance, and balance. Activity Tolerance: Patient limited by fatigue;Patient limited by pain; Patient limited by endurance  Discharge Recommendations: 24 hour supervision or assist;Home with Home health PT  PT Equipment Recommendations  Equipment Needed: Yes  Mobility Devices: Wheelchair  Wheelchair: Standard  Other: CTA, pt may require manual w/c with cushion, pending progression of mobility and gait, pt reports he owns RW, 3BA and electric scooter for community      Addendum: 2nd session Charito Kamara PT, DPT)  Pt seen in pm for second PT session. PT reports 8/10 pain to L hip with ice pack donned at end of session. Pt continues to demonstrate improved performance overall from eval requiring Cristobal to modA for t/f, modA for gait up to 50' with RW and SBA for bed mobility. Pt requires intermittent rest breaks d/t fatigue and pain and encouragement to increase mobility. Bed mobility:   Sit to supine: SBA, HOB slightly elevated, use of BR  Tranfers:  STS recliner to RW modA on first rep, Cristobal on subsequent 3 reps. STS w/c to RW Cristobal  Cues for hand palcement, anterior WS and positioning to improve safety.   Ambulation:  WB Status: WBAT LLE  Surface: level tile  Device: RW  Assistance: Dependent/Total (modA for balance, TD d/t w/c follow for safety)  Quality of Gait: Antalgic step to pattern, leading with LLE, B decreased toe clearance and heel strike, L decreased WS and stance, B decreased knee extension in stance and decreased hip/knee flexion in swing, increased posterior lean despite cues to correct. Gait Deviations: Slow Karie; Increased STEVEN; Decreased step length;Decreased step height;Shuffles  Distance: 50' x 2  Comments: Pt is unsteady during gait with cues to improve safety with turns, improve pattern and upright posture. Distances limited by pain and fatigue. Pt demonstrates minor LOB with improved performance from am session. Pt supine in bed at end of session with bed alarm in place and all needs within reach. Goals  Patient Goals   Patient goals : \"Get stronger so I can get outside and be able to do things I on my own\"  Short Term Goals  Time Frame for Short term goals: 10 days 6/12/22  Short term goal 1: Pt will complete supine to/from sit with Cristobal  Short term goal 2: Pt will complete sit to/from stand with RW with Cristobal  Short term goal 3: Pt will complete bed <> chair with RW with modA  Short term goal 4: Pt will ambulate x 15' with RW with modA without LOB  Long Term Goals  Time Frame for Long term goals : 21 days 6/23/22  Long term goal 1: Pt will complete supine to/from sit with SBA  Long term goal 2: Pt will complete functional t/f with RW with CGA  Long term goal 3: Pt will ambulate x 48' with RW with CGA without LOB  Long term goal 4: Pt will complete car t/f with RW and CGA    PLAN OF CARE/SAFETY  Plan  Plan: 5-7 times per week  Plan weeks: 3 weeks  Specific Instructions for Next Treatment: Progress mobility as tolerated  Current Treatment Recommendations: Strengthening; Wheelchair mobility training;Equipment evaluation, education, & procurement;Balance training;Gait training;Pain management; Modalities;Stair training;Functional mobility training;Transfer training;Neuromuscular re-education;Home exercise program;Positioning; Safety education & training;Manual Therapy - Soft Tissue Mobilization;Patient/Caregiver education & training; Therapeutic activities; Endurance training  Safety Devices  Type of Devices: All fall risk precautions in place;Call light within reach; Patient at risk for falls;Gait belt;Nurse notified; Left in chair;Chair alarm in place  Restraints  Restraints Initially in Place: No    EDUCATION  Education  Education Given To: Patient  Education Provided: Role of Therapy;Plan of Care;Precautions; Safety; Fall Prevention Strategies;DME/Home Modifications;Transfer Training;Equipment;ADL Function;Cognition;Mobility Training  Education Provided Comments: pt educated on importance of OOB mobility, need for safety with fatigue and buckling, safe use of hand placement and RW - requires reinforcement  Education Method: Demonstration;Verbal  Barriers to Learning: Cognition; Hearing  Education Outcome: Continued education needed;Verbalized understanding; Unable to demonstrate understanding        Therapy Time   Individual Concurrent Group Co-treatment   Time In       1040   Time Out       1140   Minutes       60     Timed Code Treatment Minutes: [address], PT, 06/06/22 at 3:35 PM       Second Session Therapy Time: Duarte Smith PTMYRAT   Individual Concurrent Group Co-treatment   Time In 1430        Time Out 1500        Minutes 30          Timed Code Treatment Minutes:  30 minutes    Total Treatment Minutes:  90 minutes    MYRA Bradford PTT C/NDT

## 2022-06-06 NOTE — PROGRESS NOTES
Pain Denies    Pain Intervention [] RN notified  [] Repositioned  [] Intervention offered and patient declined  [x] N/A  [] Other: [] RN notified  [] Repositioned  [] Intervention offered and patient declined  [] N/A  [] Other:   Subjective     Pt alert and oriented, cooperative and agreeable to participate in therapy. Pt seen sitting upright in bedside chair. Objective:  Goals     Dysphagia Goals:  Short-term Goals  Timeframe for Short-term Goals: 14 days (6/18/22)     Goal 1. The patient will tolerate recommended diet without observed clinical signs of aspiration. Did not target. Goal 2. The patient/caregiver will demonstrate understanding of compensatory strategies for improved swallowing safety. Did not target. Speech/Lang/Cog Goals:  Short-term Goals  Timeframe for Short-term Goals: 14 days (6/18/22)    Goal 1: Pt will complete graded recall tasks using compensatory strategies with 80% acc given mod cues. Functional recall/word progression task:  -pt given three words and asked to repeat the words in order that they occur  -ex: Sept, May, Nov= May, Sept, Nov  -90% acc indep improving to 100% acc given min cues     Goal 2: Pt will complete executive function tasks (meds, money, math, time, etc) with 80% acc given mod cues. Did not target. Goal 3: Pt will complete verbal and visual reasoning tasks with 80% acc given mod cues   Odd one out picture card task:  -pt given a picture card with four items  -pt asked to determine which item didn't belong and why  -86% acc indep improving to 100% acc given min cues       Goal 4: Pt will complete problem solving and thought organization tasks with 80% acc given mod cues   4 step picture card sequencing task:  -61% acc indep improving to 100% acc given mod cues     Goal 5: Pt will complete graded attention tasks (e.g. sustained, selective, alternative, divided) with 80% acc given min cues   Indirectly targeted.  Pt required occasional cues for redirection to task, but also benefited from cues for attention to detail during odd one out picture card task. Other areas targeted: N/A    Education:   Edu provided re: rationale for tx tasks provided, compensatory memory strategies      Safety Devices: [x] Call light within reach  [x] Chair alarm activated  [] Bed alarm activated  [] Other: [] Call light within reach  [] Chair alarm activated  [] Bed alarm activated  [] Other:    Assessment: Pt pleasant and cooperative, agreeable to participate. Discussed role of SLP and rationale for tx tasks provided. Pt aware of difficulties and improved insight. Pt completed functional recall/word progression task with 90% acc indep improving to 100% acc given min cues. Pt completed visual/verbal reasoning task determining which item didn't belong with 86% acc indep improving to 100% acc given min cues. Pt did demonstrate mild difficulty with four step picture sequencing cards completing with 61% acc indep improving to 100% acc given mod cues for attention to detail. Pt motivated to improve. Continue goals above. Plan: Continue as per plan of care. Additional Information:     Barriers toward progress: Confusion and Cognitive deficit  Discharge recommendations:  [] Home independently  [] Home with assistance [x]  24 hour supervision  [] ECF [] Other:  Continued Tx Upon Discharge: ? [x] Yes [] No [] TBD based on progress while on ARU [] Vital Stim indicated [] Other:   Estimated discharge date: TBD    Interventions used this date:  [] Speech/Language Treatment  [] Instruction in HEP [] Group [] Dysphagia Treatment [] Cognitive Treatment   [] Other: Total Time Breakdown / Charges    Time in Time out Total Time / units   Cognitive Tx 1300 1400 60 min/ 4 units    Speech Tx -- -- --   Dysphagia Tx -- -- --       Electronically Signed by     Merryl Baumgarten. A [facility]-SLP  Speech-Language Pathologist  [id number]

## 2022-06-06 NOTE — CARE COORDINATION
Case Management ARU Admission Assessment     Objective:  will complete initial assessment and review the role of Case Management while on the ARU. Patient will be educated on team conferences as well as discuss family training and how it is encouraged on the unit. Order for \"discharge planning\" has been addressed. Family Present: No  Primary :Dinora Young (Spouse)   [phone]     Admit date: 06/03/2022      Insurance: self    Admitting diagnosis: S/p left hip fracture    Current home situation: Independent PLOF. Lives with spouse in a one level apartment with level entry into home. Patient has walk in shower. Durable Medical Equipment at home:  Walker_rolling, rollator_Cane_x_RTS__ BSC__Shower Chair_x_  02__ HHN__ CPAP_x_  BiPap__  Hospital Bed__ W/C___ Other_Electric scooter_________    Meds to Beds program: yes    Services prior to admission: none    Preference for [facility] or Outpatient therapy: no preference    Patient's goal(s): Return home to PLOF    Working or Volunteering prior to admission:  __Yes _x_No                        Accessibility to community resources/transportation: spouse         Has patient experienced a recent loss or significant life event that would impact their care or ability to participate? _x_No  __Yes, please explain:    Has patient ever been treated for emotional disorder(s)? _x_No  __Yes, how does this affect current situation? Is patient and family coping appropriately with stressful events and this hospitalization? _x_Yes  __No, please explain:    Support system at home and in the community:family    Who will be the one to contact for family training:Dinora Young (Spouse)     24 hour care on discharge: TBD    PCP: Siria Mann MD    [facility]    Discharge plan/Summary:   spoke with patient at bedside to complete initial assessment and review the role of Case Management while on the ARU.  Patient educated on team conferences as well as discuss family training and how it is encouraged on the unit. Independent PLOF. Lives with spouse in a one level apartment with level entry into home. Patient has walk in shower. Patient has rolling and rollator walker, cane, shower chair, electric scooter and cpap at home. Patient plans to return home with possible home care if therapy recommends.   Case Management (CM) will continue to follow for discharge planning recommendations from the treatment team.

## 2022-06-06 NOTE — PLAN OF CARE
Problem: Safety - Adult  Goal: Free from fall injury  Outcome: Progressing   Patient remains free from fall injury this shift. Call light within reach. Bed/chair alarms on.

## 2022-06-06 NOTE — PROGRESS NOTES
Evangelist Cailin  6/6/2022  [id number]    Chief Complaint: S/p left hip fracture    Subjective:   No acute events overnight. Today Floyd May is seen in his room, resting in bed. He reports that he is having less pain with standing. He denies acute complaints at this time. ROS: denies f/c, n/v, cp, sob  Objective:  Patient Vitals for the past 24 hrs:   BP Temp Temp src Pulse Resp SpO2 Weight   06/06/22 1045 -- -- -- -- -- -- 205 lb (93 kg)   06/06/22 0848 121/70 -- -- -- -- -- --   06/06/22 0816 (!) 142/62 97.8 °F (36.6 °C) Oral 63 18 94 % --   06/05/22 1948 118/69 97.8 °F (36.6 °C) Oral 69 18 96 % --     Gen: No distress, pleasant. HEENT: Normocephalic, atraumatic. CV: extremities well perfused  Resp: No respiratory distress. Abd: Soft, nontender   Ext: No edema. Neuro: Alert, oriented, appropriately interactive. Wt Readings from Last 3 Encounters:   06/06/22 205 lb (93 kg)       Laboratory data:   Lab Results   Component Value Date    WBC 4.7 06/06/2022    HGB 10.5 (L) 06/06/2022    HCT 32.8 (L) 06/06/2022    MCV 78.3 (L) 06/06/2022     06/06/2022       Lab Results   Component Value Date     06/06/2022    K 5.0 06/06/2022     06/06/2022    CO2 31 06/06/2022    BUN 14 06/06/2022    CREATININE 0.9 06/06/2022    GLUCOSE 237 06/06/2022    CALCIUM 8.9 06/06/2022        Therapy progress:  PT     Objective     Sit to Stand: Maximum Assistance,Dependent/Total  Stand to sit: Maximum Assistance,Dependent/Total  Bed to Chair: Dependent/Total  Device: Parallel Bars  Assistance: Dependent/Total (Elyce Curling progressing to maxA for balance, TD d/t w/c follow and // bars)  Distance: 2 steps in // bars, limited by pain and fatigue and unable to complete further distances.   OT  PT Equipment Recommendations  Equipment Needed: Yes  Mobility Devices: Wheelchair  Wheelchair: Standard  Other: CTA, pt may require manual w/c with cushion, pending progression of mobility and gait, pt reports he owns RW, [id number] and electric scooter for [facility]  Equipment Used: Standard toilet  Toilet Transfers Comments: use of Mike Pryor  Assessment        SLP                Body mass index is 31.17 kg/m². Assessment and Plan:  Evangelist Simeon is a 78year old male with a past medical history significant for DM2, HTN, HLD, BERNARDO, COPD, prostate cancer s/p prostatectomy (2019), and GERD who fell down 2 steps and was found to have left hip and femur fracture on 5/6/22 s/p surgery. He was admitted to [facility] on 6/3/22 due to functional deficits below his baseline.     Left Hip fracture  - s/p repair in [address]  - pain control  - PT and OT     DM2  - increase Lantus to 15 units nightly  - SSI  - has metformin listed as allergy     COPD  - wean oxygen for SpO2>90%     BERNARDO  - CPAP qhs     HLD  - statin     History of Prostate Cancer  - s/p prostatectomy in 2019  - was recently told that his cancer is back and tentative plan to resume chemo  - follow up outpatient     Obesity  - BMI 41   - encourage lifestyle modifications     Bowels: Schedule stool softener. Follow bowel movements. Enema or suppository if needed.      Bladder: Check PVR x 3. [address] if PVR > 200ml or if any volume is > 500 ml.      Sleep: Trazodone provided prn.      Follow up appointments: PCP       [facility].  Ruel Braga MD 6/6/2022, 11:23 AM

## 2022-06-06 NOTE — PROGRESS NOTES
for safety during ambulation 2/2 leg buckling. 25', 39' with RW x min ax2. ambulation to collect cones with focus on endurance and safety during turns with min Ax1 10', 5', 5' for ambulation but max Ax2 for LOB correction 5x during activity. Dynamic Standing Balance Exercises: stand step x 3 steps with RW on EOB with max fatigue, CGA     Assessment  Assessment  Assessment: first session: pt improving in ambulation and ind mobility, pt max A for doff/don L socks, ind with sock aid for R foot,s et-up grooming. Pt completed bed mobility with mod I and stand step with min A. Second Session: cotreatment with PT for safe transfers and progression of mobility. PT facil ambulation in hallway and gym while OT facil posture and visual scanning with safety. Pt completed toileting with min A x2. Pt demo's difficulties and LOB and narrowe base of support with turns and nagivating functional space. Pt demo'ing knee bucklign requiriing max Ax2 for balance righting. continue POC. Activity Tolerance: Patient limited by fatigue;Patient limited by pain; Patient limited by endurance  Discharge Recommendations: 24 hour supervision or assist;Home with Home health OT;S Level 1  OT Equipment Recommendations  Equipment Needed: Yes  ADL Assistive Devices: Toileting - 3-in-1 Commode  Safety Devices  Safety Devices in place: Yes  Type of devices: All fall risk precautions in place;Gait belt;Bed alarm in place; Patient at risk for falls; Left in bed    Patient Education  Education  Education Given To: Patient  Education Provided: Role of Therapy; ADL Function;Plan of Care;Transfer Training; Safety; Fall Prevention Strategies; Equipment  Education Provided Comments: POC on ARU, transfer training, safety with transfers  Education Method: Verbal;Demonstration  Education Outcome: Verbalized understanding;Demonstrated understanding;Continued education needed    Plan  Plan  Times per Week: 5/7 days/week  Plan Weeks: 3 weeks  Current Treatment Recommendations: Strengthening;ROM;Balance training;Functional mobility training; Wheelchair mobility training; Safety education & training;Neuromuscular re-education;Cognitive reorientation; Endurance training;Pain management;Self-Care / ADL; Home management training;Cognitive/Perceptual training;Coordination training;Equipment evaluation, education, & procurement;Patient/Caregiver education & training    Goals  Patient Goals   Patient goals : \"Get independent and go home\"  Short Term Goals  Time Frame for Short term goals: 1 week- 6/10/22  Short Term Goal 1: Pt will perform functional transfers with max A. Short Term Goal 2: Pt will complete toileting with max A. Short Term Goal 3: Pt will perform LB dressing with mod A. Short Term Goal 4: Pt will complete bathing with mod A. Long Term Goals  Time Frame for Long term goals : 3 weeks- 6/23/22  Long Term Goal 1: Pt will complete functional transfers with CGA. Long Term Goal 2: Pt will perform bathing with CGA. Long Term Goal 3: Pt will complete full body dressing with CGA. Long Term Goal 4: Pt will perform toileting with CGA. Long Term Goal 5: Pt will perform grooming at sink with mod I.       Therapy Time   Individual Concurrent Group Co-treatment   Time In 0830         Time Out 0900         Minutes 30         Timed Code Treatment Minutes: 30 Minutes     Second Session Therapy Time:   Individual Concurrent Group Co-treatment   Time In      7327   Time Out       1140   Minutes       60     Timed Code Treatment Minutes:  60 Minutes    Total Treatment Minutes:  90 minutes    Linda Sanders OT

## 2022-06-07 LAB
GLUCOSE BLD-MCNC: 228 MG/DL (ref 70–99)
GLUCOSE BLD-MCNC: 269 MG/DL (ref 70–99)
GLUCOSE BLD-MCNC: 295 MG/DL (ref 70–99)
GLUCOSE BLD-MCNC: 318 MG/DL (ref 70–99)
PERFORMED ON: ABNORMAL

## 2022-06-07 PROCEDURE — 94640 AIRWAY INHALATION TREATMENT: CPT

## 2022-06-07 PROCEDURE — 97110 THERAPEUTIC EXERCISES: CPT

## 2022-06-07 PROCEDURE — 97530 THERAPEUTIC ACTIVITIES: CPT

## 2022-06-07 PROCEDURE — 6360000002 HC RX W HCPCS: Performed by: STUDENT IN AN ORGANIZED HEALTH CARE EDUCATION/TRAINING PROGRAM

## 2022-06-07 PROCEDURE — 97130 THER IVNTJ EA ADDL 15 MIN: CPT

## 2022-06-07 PROCEDURE — 6370000000 HC RX 637 (ALT 250 FOR IP): Performed by: STUDENT IN AN ORGANIZED HEALTH CARE EDUCATION/TRAINING PROGRAM

## 2022-06-07 PROCEDURE — 97535 SELF CARE MNGMENT TRAINING: CPT

## 2022-06-07 PROCEDURE — 97129 THER IVNTJ 1ST 15 MIN: CPT

## 2022-06-07 PROCEDURE — 97116 GAIT TRAINING THERAPY: CPT

## 2022-06-07 PROCEDURE — 1280000000 HC REHAB R&B

## 2022-06-07 RX ORDER — INSULIN GLARGINE 100 [IU]/ML
17 INJECTION, SOLUTION SUBCUTANEOUS NIGHTLY
Status: DISCONTINUED | OUTPATIENT
Start: 2022-06-07 | End: 2022-06-08

## 2022-06-07 RX ORDER — METHOCARBAMOL 500 MG/1
500 TABLET, FILM COATED ORAL 4 TIMES DAILY
Status: DISCONTINUED | OUTPATIENT
Start: 2022-06-07 | End: 2022-06-18 | Stop reason: HOSPADM

## 2022-06-07 RX ADMIN — ASPIRIN 81 MG: 81 TABLET, COATED ORAL at 09:43

## 2022-06-07 RX ADMIN — INSULIN LISPRO 4 UNITS: 100 INJECTION, SOLUTION INTRAVENOUS; SUBCUTANEOUS at 16:27

## 2022-06-07 RX ADMIN — AMLODIPINE BESYLATE 10 MG: 5 TABLET ORAL at 09:43

## 2022-06-07 RX ADMIN — INSULIN GLARGINE 17 UNITS: 100 INJECTION, SOLUTION SUBCUTANEOUS at 22:00

## 2022-06-07 RX ADMIN — ATORVASTATIN CALCIUM 40 MG: 40 TABLET, FILM COATED ORAL at 22:00

## 2022-06-07 RX ADMIN — OXYCODONE 5 MG: 5 TABLET ORAL at 08:24

## 2022-06-07 RX ADMIN — INSULIN LISPRO 2 UNITS: 100 INJECTION, SOLUTION INTRAVENOUS; SUBCUTANEOUS at 06:10

## 2022-06-07 RX ADMIN — METHOCARBAMOL 500 MG: 500 TABLET ORAL at 22:00

## 2022-06-07 RX ADMIN — TROSPIUM CHLORIDE 20 MG: 20 TABLET, FILM COATED ORAL at 16:27

## 2022-06-07 RX ADMIN — PANTOPRAZOLE SODIUM 40 MG: 40 TABLET, DELAYED RELEASE ORAL at 06:07

## 2022-06-07 RX ADMIN — GABAPENTIN 300 MG: 300 CAPSULE ORAL at 22:00

## 2022-06-07 RX ADMIN — VALSARTAN 160 MG: 80 TABLET, FILM COATED ORAL at 09:43

## 2022-06-07 RX ADMIN — CYANOCOBALAMIN TAB 500 MCG 1000 MCG: 500 TAB at 09:42

## 2022-06-07 RX ADMIN — DOCUSATE SODIUM 50 MG AND SENNOSIDES 8.6 MG 2 TABLET: 8.6; 5 TABLET, FILM COATED ORAL at 11:41

## 2022-06-07 RX ADMIN — ENOXAPARIN SODIUM 40 MG: 100 INJECTION SUBCUTANEOUS at 09:42

## 2022-06-07 RX ADMIN — INSULIN LISPRO 2 UNITS: 100 INJECTION, SOLUTION INTRAVENOUS; SUBCUTANEOUS at 11:41

## 2022-06-07 RX ADMIN — METHOCARBAMOL 500 MG: 500 TABLET ORAL at 11:41

## 2022-06-07 RX ADMIN — METHOCARBAMOL 500 MG: 500 TABLET ORAL at 16:27

## 2022-06-07 RX ADMIN — TRAZODONE HYDROCHLORIDE 50 MG: 50 TABLET ORAL at 22:00

## 2022-06-07 RX ADMIN — Medication 2 PUFF: at 22:43

## 2022-06-07 RX ADMIN — GABAPENTIN 300 MG: 300 CAPSULE ORAL at 08:23

## 2022-06-07 RX ADMIN — TROSPIUM CHLORIDE 20 MG: 20 TABLET, FILM COATED ORAL at 06:07

## 2022-06-07 RX ADMIN — SERTRALINE 100 MG: 50 TABLET, FILM COATED ORAL at 09:43

## 2022-06-07 RX ADMIN — INSULIN LISPRO 2 UNITS: 100 INJECTION, SOLUTION INTRAVENOUS; SUBCUTANEOUS at 22:01

## 2022-06-07 RX ADMIN — DOCUSATE SODIUM 50 MG AND SENNOSIDES 8.6 MG 2 TABLET: 8.6; 5 TABLET, FILM COATED ORAL at 21:59

## 2022-06-07 ASSESSMENT — PAIN DESCRIPTION - LOCATION
LOCATION: HIP;RIB CAGE
LOCATION: HIP;RIB CAGE

## 2022-06-07 ASSESSMENT — PAIN DESCRIPTION - ORIENTATION
ORIENTATION: LEFT
ORIENTATION: LEFT

## 2022-06-07 ASSESSMENT — PAIN SCALES - GENERAL
PAINLEVEL_OUTOF10: 0
PAINLEVEL_OUTOF10: 0
PAINLEVEL_OUTOF10: 5
PAINLEVEL_OUTOF10: 0
PAINLEVEL_OUTOF10: 5
PAINLEVEL_OUTOF10: 0

## 2022-06-07 ASSESSMENT — PAIN DESCRIPTION - FREQUENCY: FREQUENCY: INTERMITTENT

## 2022-06-07 ASSESSMENT — PAIN DESCRIPTION - DESCRIPTORS
DESCRIPTORS: DULL
DESCRIPTORS: DULL

## 2022-06-07 NOTE — PROGRESS NOTES
Elke Stevenson  6/7/2022  [id number]    Chief Complaint: S/p left hip fracture    Subjective:   No acute events overnight. Today Venus King is seen in his room with therapy present. He reports continued pain in his left leg with standing and walking. He reports having a bowel movement this morning. He notes some confusion this morning when he woke up. ROS: denies f/c, n/v, cp, sob    Objective:  Patient Vitals for the past 24 hrs:   BP Temp Temp src Pulse Resp SpO2 Weight   06/07/22 0800 (!) 145/65 97.7 °F (36.5 °C) Oral 64 16 94 % --   06/06/22 2045 (!) 156/78 98.6 °F (37 °C) Oral 61 16 94 % --   06/06/22 1951 -- -- -- -- 16 -- --   06/06/22 1921 -- -- -- -- 16 -- --   06/06/22 1045 -- -- -- -- -- -- 205 lb (93 kg)     Gen: No distress, pleasant. HEENT: Normocephalic, atraumatic. CV: extremities well perfused  Resp: No respiratory distress. Abd: Soft, nontender   Ext: No edema. Lifts left leg off of chair, but limited by pain. Incision left hip well approximated without erythema or drainage  Neuro: Alert, oriented, appropriately interactive. Wt Readings from Last 3 Encounters:   06/06/22 205 lb (93 kg)       Laboratory data:   Lab Results   Component Value Date    WBC 4.7 06/06/2022    HGB 10.5 (L) 06/06/2022    HCT 32.8 (L) 06/06/2022    MCV 78.3 (L) 06/06/2022     06/06/2022       Lab Results   Component Value Date     06/06/2022    K 5.0 06/06/2022     06/06/2022    CO2 31 06/06/2022    BUN 14 06/06/2022    CREATININE 0.9 06/06/2022    GLUCOSE 237 06/06/2022    CALCIUM 8.9 06/06/2022        Therapy progress:  PT     Objective     Sit to Stand: Maximum Assistance,Dependent/Total  Stand to sit: Maximum Assistance,Dependent/Total  Bed to Chair: Dependent/Total  Device: Parallel Bars  Assistance: Dependent/Total (Yonathan Ortiz progressing to maxA for balance, TD d/t w/c follow and // bars)  Distance: 2 steps in // bars, limited by pain and fatigue and unable to complete further distances.   OT  PT Equipment Recommendations  Equipment Needed: Yes  Mobility Devices: Wheelchair  Wheelchair: Standard  Other: CTA, pt may require manual w/c with cushion, pending progression of mobility and gait, pt reports he owns RW, 1[employer] and electric scooter for community  Equipment Used: Standard toilet  Toilet Transfers Comments: use of Silv[place]  Assessment        SLP                Body mass index is 31.17 kg/m². Assessment and Plan:  Mancil Severance is a 78year old male with a past medical history significant for DM2, HTN, HLD, BERNARDO, COPD, prostate cancer s/p prostatectomy (2019), and GERD who fell down 2 steps and was found to have left hip and femur fracture on 5/6/22 s/p surgery. He was admitted to [facility] on 6/3/22 due to functional deficits below his baseline.     Left Hip fracture  - s/p repair in [place]  - WBAT  - PT and OT     Post-operative Pain  - tylenol PRN, oxycodone PRN  - add robaxin scheduled    DM2  - increase Lantus to 17 units nightly  - SSI  - has metformin listed as allergy     COPD  - wean oxygen for SpO2>90%     BERNARDO  - CPAP qhs     HLD  - statin     History of Prostate Cancer  - s/p prostatectomy in 2019  - was recently told that his cancer is back and tentative plan to resume chemo  - follow up outpatient     Obesity  - BMI 41   - encourage lifestyle modifications     Bowels: Schedule stool softener. Follow bowel movements. Enema or suppository if needed.      Bladder: Check PVR x 3. [address] if PVR > 200ml or if any volume is > 500 ml.      Sleep: Trazodone provided prn.      Follow up appointments: PCP     Nena Menon.  Priscilla Singh MD 6/7/2022, 9:30 AM

## 2022-06-07 NOTE — PROGRESS NOTES
Speech Language Pathology  [facility]: ACUTE REHAB UNIT  SPEECH-LANGUAGE PATHOLOGY      [x] Daily  [] Weekly Care Conference Note  [] Discharge    Patient:Saravanan Shrestha Head      OAY:[birth year]  QVP:[id number]  Rehab Dx/Hx: S/p left hip fracture [Z87.81]    Precautions: falls  Home situation: pt lives with wife, active , indep with meds, wife manages finances and other household tasks   ST Dx: [] Aphasia  [] Dysarthria  [] Apraxia   [] Oropharyngeal dysphagia [] Cognitive Impairment  [] Other:   Date of Admit: 6/3/2022  Room #: 0151/0151-01    Current functional status (updated daily):         Pt being seen for : [] Speech/Language Treatment  [] Dysphagia Treatment [x] Cognitive Treatment  [] Other:  Communication: []WFL  [] Aphasia  [x] Dysarthria  [] Apraxia  [] Pragmatic Impairment [] Non-verbal  [] Hearing Loss  [] Other:   Cognition: [] WFL  [] Mild  [] Moderate  [x] Severe [] Unable to Assess  [] Other:  Memory: [] WFL  [] Mild  [] Moderate  [x] Severe [] Unable to Assess  [] Other:  Behavior: [x] Alert  [x] Cooperative  [x]  Pleasant  [] Confused  [] Agitated  [] Uncooperative  [] Distractible [] Motivated  [] Self-Limiting [] Anxious  [] Other:  Endurance:  [x] Adequate for participation in SLP sessions  [] Reduced overall  [] Lethargic  [] Other:  Safety: [] No concerns at this time  [x] Reduced insight into deficits  [x]  Reduced safety awareness [] Not following call light procedures  [] Unable to Assess  [] Other:    Current Diet Order:ADULT DIET;  Regular; 5 carb choices (75 gm/meal)  Swallowing Precautions: upright for all intake, stay upright for at least 30 mins after intake, small bites/sips, alternate bites/sips and slow rate of intake        Date: 6/7/2022      Tx session 1  [id number] Tx session 2  All tx needs met in session 1    Total Timed Code Min 60    Total Treatment Minutes 60    Individual Treatment Minutes 60    Group Treatment Minutes 0    Co-Treat Minutes 0    Variance/Reason:  N/A Pain Hip pain    Pain Intervention [x] RN notified- administered meds  [] Repositioned  [] Intervention offered and patient declined  [] N/A  [] Other: [] RN notified  [] Repositioned  [] Intervention offered and patient declined  [] N/A  [] Other:   Subjective     Pt alert and oriented, cooperative and agreeable to participate in therapy. Pt seen sitting upright in bed. Objective:  Goals     Dysphagia Goals:  Short-term Goals  Timeframe for Short-term Goals: 14 days (6/18/22)     Goal 1. The patient will tolerate recommended diet without observed clinical signs of aspiration. Did not directly target. RN administered meds whole with TL, pt tolerating without any difficulty or s/s of aspiration. Goal 2. The patient/caregiver will demonstrate understanding of compensatory strategies for improved swallowing safety. Did not target. Speech/Lang/Cog Goals:  Short-term Goals  Timeframe for Short-term Goals: 14 days (6/18/22)    Goal 1: Pt will complete graded recall tasks using compensatory strategies with 80% acc given mod cues. Functional recall/mental manipulation task:  -pt given four scrambled words and asked to unscramble them  -80% acc indep improving to 100% acc given min cues   -pt benefited from use of repetition compensatory strategy      Goal 2: Pt will complete executive function tasks (meds, money, math, time, etc) with 80% acc given mod cues.        Time word problems:  -90% acc indep improving to 100% acc given min cues     Goal 3: Pt will complete verbal and visual reasoning tasks with 80% acc given mod cues   Word deduction:  -pt given three clue words and asked to name the target item/object being described  -100% acc min cues      Goal 4: Pt will complete problem solving and thought organization tasks with 80% acc given mod cues   Thought organization/categorization task:  -pt asked to determine which item was longest, shortest, loudest, etc  -pt given three words to choose from  -100% acc indep      Goal 5: Pt will complete graded attention tasks (e.g. sustained, selective, alternative, divided) with 80% acc given min cues   Indirectly targeted. WFL attention throughout duration of tx session. Other areas targeted: N/A    Education:   Edu provided re: compensatory memory strategies, time concepts      Safety Devices: [x] Call light within reach  [x] Chair alarm activated  [] Bed alarm activated  [] Other: [] Call light within reach  [] Chair alarm activated  [] Bed alarm activated  [] Other:    Assessment: Pt pleasant and cooperative, agreeable to participate. Pt oriented x4 indep. Pt with overall functional attention throughout tx session. Pt completed functional recall/unscrambling word taks with 80% acc indep improving to 100% acc given min cues. Time concepts completed with 90% acc indep improving to 100% acc given min cues. Pt is making consistent gains and progress towards goals, improved insight and safety noted during transfer from bed to bathroom. Pt remains motivated to improve. Continue goals above. Plan: Continue as per plan of care. Additional Information:     Barriers toward progress: Confusion and Cognitive deficit  Discharge recommendations:  [] Home independently  [] Home with assistance [x]  24 hour supervision  [] ECF [] Other:  Continued Tx Upon Discharge: ? [x] Yes [] No [] TBD based on progress while on ARU [] Vital Stim indicated [] Other:   Estimated discharge date: TBD    Interventions used this date:  [] Speech/Language Treatment  [] Instruction in HEP [] Group [] Dysphagia Treatment [] Cognitive Treatment   [] Other: Total Time Breakdown / Charges    Time in Time out Total Time / units   Cognitive Tx 0800 0900 60 mins/ 4 units    Speech Tx -- -- --   Dysphagia Tx -- -- --       Electronically Signed by     Beth Mckeon. A CCC-SLP  Speech-Language Pathologist  [id number]

## 2022-06-07 NOTE — PROGRESS NOTES
balance at sink for less than 1 minute, mod A to safely return to w/c to complete grooming, SPV for oral hygiene, combing hair, and shaving while seated  Upper Extremity Dressing  Assistance Level: Set-up  Skilled Clinical Factors: to don shirt  Lower Extremity Dressing  Assistance Level: Minimal assistance  Skilled Clinical Factors: to stand to pull shorts over hips, setup to thread BLEs into shorts while seated          Functional Mobility  Device: Rolling walker  Activity: To/From bathroom  Assistance Level: Minimal assistance  Transfers  Surface: To chair with arms;From chair with arms; Wheelchair  Additional Factors: Verbal cues; Hand placement cues; With handrails  Device: Walker  Sit to Stand  Assistance Level: Minimal assistance  Stand to Sit  Assistance Level: Minimal assistance  Stand Pivot  Assistance Level: Minimal assistance   OT Exercises  Resistive Exercises: 3# weight for shoulder flexion, shoulder horizontal abduction, elbow flexion, wrist flexion, wrist extension, supination/pronation, and chest press x20 each  Circulation/Endurance Exercises: 2x5 sit<>stands     Assessment  Assessment  Assessment: Pt agreeable to OT session. Pt completed sit<>stands with improved balance/strength for min A/CGA with posterior lean initially in stance. Pt completed donning shorts with min A and mobility recliner>bathroom with min A. Pt unable to stand to groom, requiring rest break after ~1 minute and mod A to safely reach w/c to complete grooming seated. Pt performed w/c mobility with min A to propell ~20 feet in 2 minutes. Second Session: Pt completed BUE ther ex with fair to good strength for BUEs with 3# weight. Pt completed NHPT with decreased speed on BUEs and fast on LUE than RUE. Pt required co-treat due to decreased LE strength, decreased body awareness/insight, and poor endurance. Pt completed mobility with RW and CGA/min A of 1 to collect cones for 2:20 with one LOB requiring min A of 2 to correct.  Pt demonstrated good sequencing of 6 color pattern. Pt performed mobility for 45 seconds with min A of 1. Pt traversed x5 stairs with min A from OT for cueing step pattern and stabilizing at hips and up to max A from PT to pivot and safely reach chair at bottom of stairs. Pt completed mobility down hallway for 5 minutes with min A of 1-2 with PT assisting with AD management and stability at hips while OT cued for visual scanning, collecting objects, and reaching out of STEVEN. Continue POC. Activity Tolerance: Patient limited by fatigue;Patient limited by pain; Patient limited by endurance  Discharge Recommendations: 24 hour supervision or assist;Home with Home health OT;S Level 1  OT Equipment Recommendations  Equipment Needed: Yes  Mobility Devices: ADL Assistive Devices  ADL Assistive Devices: Toileting - 3-in-1 Commode  Safety Devices  Safety Devices in place: Yes  Type of devices: All fall risk precautions in place;Gait belt;Patient at risk for falls;Call light within reach; Chair alarm in place; Left in chair;Nurse notified    Patient Education  Education  Education Given To: Patient  Education Provided: Role of Therapy; ADL Function;Plan of Care;Transfer Training; Safety; Fall Prevention Strategies; Equipment; Mobility Training;Precautions;DME/Home Modifications  Education Provided Comments: POC on ARU, transfer training, safety with transfers, sit<>stands, ther ex  Education Method: Verbal;Demonstration  Barriers to Learning: Cognition  Education Outcome: Verbalized understanding;Demonstrated understanding;Continued education needed    Plan  Plan  Times per Week: 5/7 days/week  Plan Weeks: 3 weeks  Current Treatment Recommendations: Strengthening;ROM;Balance training;Functional mobility training; Wheelchair mobility training; Safety education & training;Neuromuscular re-education;Cognitive reorientation; Endurance training;Pain management;Self-Care / ADL; Home management training;Cognitive/Perceptual training;Coordination training;Equipment evaluation, education, & procurement;Patient/Caregiver education & training    Goals  Short Term Goals  Time Frame for Short term goals: 1 week- 6/10/22  Short Term Goal 1: Pt will perform functional transfers with max A. Short Term Goal 2: Pt will complete toileting with max A. Short Term Goal 3: Pt will perform LB dressing with mod A. Short Term Goal 4: Pt will complete bathing with mod A. Long Term Goals  Time Frame for Long term goals : 3 weeks- 6/23/22  Long Term Goal 1: Pt will complete functional transfers with CGA. Long Term Goal 2: Pt will perform bathing with CGA. Long Term Goal 3: Pt will complete full body dressing with CGA. Long Term Goal 4: Pt will perform toileting with CGA. Long Term Goal 5: Pt will perform grooming at sink with mod I.       Therapy Time   Individual Concurrent Group Co-treatment   Time In 0900         Time Out 0930         Minutes 30         Timed Code Treatment Minutes: 30 Minutes    Second Session Therapy Time:   Individual Concurrent Group Co-treatment   Time In 1030     1100   Time Out 1100     1130   Minutes 30     30     Timed Code Treatment Minutes:  60 Minutes    Total Treatment Minutes:  90 minutes      Augustin Rodriguez OT

## 2022-06-07 NOTE — PLAN OF CARE
Problem: Pain  Goal: Verbalizes/displays adequate comfort level or baseline comfort level  6/7/2022 0741 by Norma Mahmood RN  Outcome: Progressing  6/6/2022 2159 by Natalee Hdz RN  Outcome: Progressing     Problem: Skin/Tissue Integrity  Goal: Absence of new skin breakdown  Description: 1. Monitor for areas of redness and/or skin breakdown  2. Assess vascular access sites hourly  3. Every 4-6 hours minimum:  Change oxygen saturation probe site  4. Every 4-6 hours:  If on nasal continuous positive airway pressure, respiratory therapy assess nares and determine need for appliance change or resting period.   6/7/2022 0741 by Norma Mahmood RN  Outcome: Progressing  6/6/2022 2159 by Natalee Hdz RN  Outcome: Progressing

## 2022-06-07 NOTE — PATIENT CARE CONFERENCE
[facility]  Inpatient Rehabilitation  Weekly Team Conference Note    Date: 2022  Patient Name: Arlette Yeager        MRN: [id number]    : 1945  (78 y.o.)  Gender: male   Referring Practitioner: Haroldo Powell MD  Diagnosis: Fall, L hip fx s/p IMN      Interventions to be utilized toward barriers to discharge, per discipline:  [address] observed barriers to dc: Lower extremity weakness, Incontinence of bladder, Skin Care, Wound Care and Medication managment  Nursing interventions: Assist with ADLs, Wound Care, Incontinent Care  Family Education:NO  Fall Risk:  No      Physical therapy observed barriers to dc:    Baseline: ind with no AD, pt reports he uses electric scooter at times    Current level: min A bed mobility, min A transfers with RW, CGA-mod A gait up to 70 ft, TD x 4 steps    Barriers to DC: pain, endurance, weakness, insight, cog    Needs in order to achieve dc home/next level of care: pt will have to be at a mod ind level or better to d/c home. rec HHPT vs OPPT pending progress.        Physical therapy interventions:   Current Treatment Recommendations: Strengthening,Wheelchair mobility training,Equipment evaluation, education, & procurement,Balance training,Gait training,Pain management,Modalities,Stair training,Functional mobility training,Transfer training,Neuromuscular re-education,Home exercise program,Positioning,Safety education & training,Manual Therapy - Soft Tissue Mobilization,Patient/Caregiver education & training,Therapeutic activities,Endurance training,Manual Therapy - Joint Manipulation,Cognitive reorientation      PHYSICAL THERAPY  Recommendation:   PT Equipment Recommendations  Equipment Needed: Yes  Mobility Devices: Wheelchair  Wheelchair: Standard  Other: CTA, pt may require manual w/c with cushion, pending progression of mobility and gait, pt reports he owns RW, [id number] and electric scooter for community      Assessment: pt seen as co treat wiht OT for increased safety progressing funcitonal mobility. co treat no longer indicated as pt is consistently performing mobility with 1 person assist. grossly min a for transfers, CGA-mod A for gait with RW. continues to be limited by LLE weakness/pain with experiences multiple episodes of LLE bucking. poor insight into safety throughout despite cues. continue to rec home with 24/7 and HHPT, DME: RW, potential need for w/c. Activity Tolerance: Patient limited by fatigue;Patient limited by pain; Patient limited by endurance  Discharge Recommendations: 24 hour supervision or assist;Home with Home health PT  PT Equipment Recommendations  Equipment Needed: Yes  Wheelchair: Standard  Other: CTA, pt may require manual w/c with cushion, pending progression of mobility and gait, pt reports he owns RW, 8BN and electric scooter for community      Occupational therapy observed barriers to dc:    Baseline: mod I all ADLs and transfers   Current level: mod/max A for stand step transfers and min/mod A LB ADLs   Barriers to DC: LE weakness, decreased insight, poor activity tolerance, pain   Needs in order to achieve dc home/next level of care: SPV/mod I all ADLs and transfers    Occupational Therapy interventions:  Current Treatment Recommendations: Strengthening,ROM,Balance training,Functional mobility training,Wheelchair mobility training,Safety education & training,Neuromuscular re-education,Cognitive reorientation,Endurance training,Pain management,Self-Care / ADL,Home management training,Cognitive/Perceptual training,Coordination training,Equipment evaluation, education, & procurement,Patient/Caregiver education & training      OCCUPATIONAL THERAPY  Assessment  Activity Tolerance: Patient limited by fatigue;Patient limited by pain; Patient limited by endurance         Speech therapy observed barriers to dc:    Baseline: pt lives with wife, active , indep with meds, wife manages finances and household tasks, retired   Current level: mild-mod cognitive linguistic deficit    Barriers to DC: reduced insight    Needs in order to achieve dc home/next level of care: carryover of compensatory strategies, assist with higher level executive functioning tasks (meds/money)    Speech Therapy interventions:  Dysphagia: diet tolerance monitoring, safe swallow compensatory strategies  Speech/Language/Cognition: Compensatory strategy training and carryover, recall/STM, problem solving, reasoning, exec function, thought organization, attention. SPEECH THERAPY:  Pt pleasant and cooperative, agreeable to participate. Pt oriented x4 indep. Pt with overall functional attention throughout tx session. Pt completed functional recall/unscrambling word taks with 80% acc indep improving to 100% acc given min cues. Time concepts completed with 90% acc indep improving to 100% acc given min cues. Pt is making consistent gains and progress towards goals, improved insight and safety noted during transfer from bed to bathroom. Pt remains motivated to improve. NUTRITION  Weight: 205 lb (93 kg) / Body mass index is 31.17 kg/m². Diet Order: ADULT DIET; Regular; 5 carb choices (75 gm/meal)  PO Meals Eaten (%): 51 - 75%  Education: No recommendation at this time       CASE MANAGEMENT  Assessment: 78 yr old male. Dx:S/p left hip fracture. Therapy recommendations are 24 hour supervision or assist;Home with Home health PT/OT. Patient already has RW, 6IT and electric scooter for community. CM will need home care and DME orders early to submit to [place]. CM will continue to support for discharge needs. Interdisciplinary Goals:   1.) pt will implement use of compensatory memory strategies for improved recall across all disciplines given min cues   2.) pt will complete functional transfers with CGA and LRAD. 3.) Pt will complete toileting with mod A.       [x]  Family Training discussed at conference and to be scheduled with wife and family who will be

## 2022-06-07 NOTE — PROGRESS NOTES
Physical Therapy  Facility/Department: [facility]       Rehabilitation Physical Therapy Treatment Note    NAME: Helena Moeller  : 1945 (78 y.o.)  MRN: [id number]  CODE STATUS: Full Code    Date of Service: 22      Past Medical History:   Diagnosis Date    Diabetes mellitus ([facility] [address]     Hypertension      No past surgical history on file. Chart Reviewed: Yes  Patient assessed for rehabilitation services?: Yes  Additional Pertinent Hx: Per Dr. Macy Padilla H&P \"67 year old male with a past medical history significant for DM2, HTN, HLD, BERNARDO, COPD, prostate cancer s/p prostatectomy (2019), and GERD who fell down 2 steps and was found to have left hip and femur fracture on 22. He had surgery in [place] and was discharge to SNF in [facility]. He left SNF due to being unhappy with his care there and presented to [facility] on 22 with hip pain\"  Family / Caregiver Present: No  Referring Practitioner: Sheba Luna MD  Referral Date : 22  Diagnosis: Fall, L hip fx s/p IMN  General Comment  Comments: RN cleared pt for session    Restrictions:  Restrictions/Precautions: Weight Bearing;General Precautions; Fall Risk  Lower Extremity Weight Bearing Restrictions  Left Lower Extremity Weight Bearing: Weight Bearing As Tolerated     SUBJECTIVE  Subjective: found in w/c with OT, reporting 7/10 pain LLE  Pain: 7/10 L LE       Cognition  Overall Cognitive Status: Exceptions  Arousal/Alertness: Appropriate responses to stimuli  Following Commands: Follows one step commands with repetition; Follows one step commands with increased time  Attention Span: Attends with cues to redirect  Memory: Decreased recall of recent events;Decreased short term memory;Decreased recall of precautions  Safety Judgement: Decreased awareness of need for safety;Decreased awareness of need for assistance  Insights: Decreased awareness of deficits       Functional Mobility  Transfers  Sit to Stand: Minimal Assistance  Stand to sit: Minimal Assistance  Comment: sit to stand w/c to RW x 2 reps with min A, sit to stand high chair to RW with min A x 2 reps. pt requires cues for eccentric control and to square self prior to sitting  Balance  Posture: Fair  Sitting - Static: Fair  Sitting - Dynamic: Fair  Standing - Static: Fair  Standing - Dynamic: Poor    Environmental Mobility  Ambulation  WB Status: WBAT LLE  Ambulation  Surface: level tile  Device: Rolling Walker  Assistance: Contact guard assistance; Moderate assistance  Quality of Gait: Antalgic step to pattern, leading with LLE, B decreased toe clearance and heel strike, L decreased WS and stance, B decreased knee extension in stance and decreased hip/knee flexion in swing,  Gait Deviations: Slow Karie; Increased STEVEN; Decreased step length;Decreased step height;Shuffles  Distance: 47 ft, 18 ft ( several 180* turns)  Comments: item retrieval task in communit room with RW, CGA grossly however pt experiences 3 LOB due to LLE buckling resulting in pt requiring mod A to maintain balance. More Ambulation?: Yes  Ambulation 2  Surface - 2: level tile  Device 2: Rolling Walker  Assistance 2: Minimal assistance; Moderate assistance  Quality of Gait 2: Antalgic step to pattern, leading with LLE, B decreased toe clearance and heel strike, L decreased WS and stance, B decreased knee extension in stance and decreased hip/knee flexion in swing,  Distance: 112 ft  Comments: pr picking up clothespins placed in arauz on R and L side. pt requires consistent cues to get closer to clothespin prior to reaching to L to prevent LLE buckling. min-mod a for balance. cues for uprhigt posture, as distance increases, pt demo's increased B hip/knee/trunk flexion  Stairs/Curb  Stairs?: Yes  Stairs  # Steps : 4  Stairs Height: 6\"  Rails: Bilateral  Assistance: Dependent/Total  Comment: step to pattern x 4 6\" with BHR CGA-min a of 1-2 ascending and min  A of 1+ max a fo 1 to descend.  pt demo's increased hip/knee flexion while descending, diffiuclty extending to upright standing resulting in pt requireing increased assist.        Second Session:   Found supine in bed. Reporting pain 6/10 pain LLE. Agreeable to session. Standing tolerance limited by c/o LLE pain and fatigue. Supine to sit with bed flat, use of bed rail and min A with multiple attempts due to posterior LOB when transitioning to sitting. Sit to stand EOB to RW with CGA    Gait x 62 ft, 40 ft, 75 ft with RW, step to progressing to partial step through pattern with minor LLE buckling with no overt LOB. Pt demo's narrow STEVEN, decreased step height/length and LLE heel strike, increased BLE knee/hip flexion as distance continues. Limited carryover of education provided for improved gait mechanics. Pt completed 10 BLE toe taps onto 2\" step with BUE support and CGA  Pt completed 10 BLE toe taps onto 4\" step with BUE support and CGA-min A, pt experiences posterior LOB when returning to double stance    Dyn stand activity: begin shoulder width STEVEN-> RLE forward step into stagger stance-> RUE reach to IL side for cone-> place to target across midline-> step back to start with 1-2 UE support on RW and CGA-min A x 10 reps. Cone placed between feet to facilitate wide STEVEN. Pt repeated activity x 10 reps with LLE stepping into stagger stance and LUE reaching across midline with CGA-min A. Stand pivot w/c <> SCFIT with RW and CGA  Pt completed 8 min on SCIFIT level 1 with BLEs and BUE for increased endurance/ LE strength. Gait x 15 ft with RW, CGA-min A , limited by LE pain/ weakness. Stand pivot w/c to recliner with CGA, cues for safety  Pt in recliner with alarm donned and call light/needs within reach.        ASSESSMENT  Vitals  Heart Rate: 72  Heart Rate Source: Monitor  BP: 107/61  BP Location: Right upper arm  MAP (Calculated): 76.33  SpO2: 95 %  O2 Device: None (Room air)    Activity Tolerance  Activity Tolerance: Patient limited by fatigue;Patient limited by pain;Patient limited by endurance    Assessment  Assessment: pt seen as co treat wiht OT for increased safety progressing funcitonal mobility. co treat no longer indicated as pt is consistently performing mobility with 1 person assist. grossly min a for transfers, CGA-mod A for gait with RW. continues to be limited by LLE weakness/pain with experiences multiple episodes of LLE bucking. poor insight into safety throughout despite cues. continue to rec home with 24/7 and HHPT, DME: RW, potential need for w/c. Treatment Diagnosis: Impaired balance and gait  Therapy Prognosis: Good  Decision Making: Medium Complexity  Barriers to Learning: Cognition, hearing, slow processing/sequencing and poor insight  Discharge Recommendations: 24 hour supervision or assist;Home with Home health PT  PT D/C Equipment  Wheelchair: Standard  Other: CTA, pt may require manual w/c with cushion, pending progression of mobility and gait, pt reports he owns RW, 9HS and electric scooter for community  PT Equipment Recommendations  Equipment Needed: Yes  Wheelchair: Standard  Other: CTA, pt may require manual w/c with cushion, pending progression of mobility and gait, pt reports he owns RW, [id number] and electric scooter for community    CLINICAL IMPRESSION   pt seen as co treat wiht OT for increased safety progressing funcitonal mobility. co treat no longer indicated as pt is consistently performing mobility with 1 person assist. grossly min a for transfers, CGA-mod A for gait with RW. continues to be limited by LLE weakness/pain with experiences multiple episodes of LLE bucking. poor insight into safety throughout despite cues. continue to rec home with 24/7 and HHPT, DME: RW, potential need for w/c. GOALS  Patient Goals   Patient goals :  \"Get stronger so I can get outside and be able to do things I on my own\"  Short Term Goals  Time Frame for Short term goals: 10 days 6/12/22  Short term goal 1: Pt will complete supine to/from sit with understanding    ELOS:   Plan weeks: 3 weeks      Therapy Time   Individual Concurrent Group Co-treatment   Time In 1300     1100   Time Out 1400     1130   Minutes 60     30     Timed Code Treatment Minutes: 80 Minutes       Brown Elizabeth PT, 06/07/22 at 11:47 AM

## 2022-06-08 ENCOUNTER — APPOINTMENT (OUTPATIENT)
Dept: GENERAL RADIOLOGY | Age: 77
DRG: 560 | End: 2022-06-08
Attending: STUDENT IN AN ORGANIZED HEALTH CARE EDUCATION/TRAINING PROGRAM
Payer: OTHER GOVERNMENT

## 2022-06-08 LAB
GLUCOSE BLD-MCNC: 205 MG/DL (ref 70–99)
GLUCOSE BLD-MCNC: 259 MG/DL (ref 70–99)
GLUCOSE BLD-MCNC: 260 MG/DL (ref 70–99)
GLUCOSE BLD-MCNC: 280 MG/DL (ref 70–99)
PERFORMED ON: ABNORMAL

## 2022-06-08 PROCEDURE — 6370000000 HC RX 637 (ALT 250 FOR IP): Performed by: STUDENT IN AN ORGANIZED HEALTH CARE EDUCATION/TRAINING PROGRAM

## 2022-06-08 PROCEDURE — 1280000000 HC REHAB R&B

## 2022-06-08 PROCEDURE — 97112 NEUROMUSCULAR REEDUCATION: CPT

## 2022-06-08 PROCEDURE — 97530 THERAPEUTIC ACTIVITIES: CPT

## 2022-06-08 PROCEDURE — 97110 THERAPEUTIC EXERCISES: CPT

## 2022-06-08 PROCEDURE — 71045 X-RAY EXAM CHEST 1 VIEW: CPT

## 2022-06-08 PROCEDURE — 97130 THER IVNTJ EA ADDL 15 MIN: CPT

## 2022-06-08 PROCEDURE — 6360000002 HC RX W HCPCS: Performed by: STUDENT IN AN ORGANIZED HEALTH CARE EDUCATION/TRAINING PROGRAM

## 2022-06-08 PROCEDURE — 97129 THER IVNTJ 1ST 15 MIN: CPT

## 2022-06-08 PROCEDURE — 97116 GAIT TRAINING THERAPY: CPT

## 2022-06-08 RX ORDER — INSULIN GLARGINE 100 [IU]/ML
19 INJECTION, SOLUTION SUBCUTANEOUS NIGHTLY
Status: DISCONTINUED | OUTPATIENT
Start: 2022-06-08 | End: 2022-06-09

## 2022-06-08 RX ORDER — VALSARTAN 80 MG/1
80 TABLET ORAL DAILY
Status: DISCONTINUED | OUTPATIENT
Start: 2022-06-09 | End: 2022-06-15

## 2022-06-08 RX ORDER — OXYCODONE HYDROCHLORIDE 5 MG/1
2.5 TABLET ORAL EVERY 4 HOURS PRN
Status: DISCONTINUED | OUTPATIENT
Start: 2022-06-08 | End: 2022-06-18 | Stop reason: HOSPADM

## 2022-06-08 RX ADMIN — ATORVASTATIN CALCIUM 40 MG: 40 TABLET, FILM COATED ORAL at 21:39

## 2022-06-08 RX ADMIN — TRAZODONE HYDROCHLORIDE 50 MG: 50 TABLET ORAL at 21:39

## 2022-06-08 RX ADMIN — METHOCARBAMOL 500 MG: 500 TABLET ORAL at 16:36

## 2022-06-08 RX ADMIN — GABAPENTIN 300 MG: 300 CAPSULE ORAL at 21:39

## 2022-06-08 RX ADMIN — SERTRALINE 100 MG: 50 TABLET, FILM COATED ORAL at 08:35

## 2022-06-08 RX ADMIN — TROSPIUM CHLORIDE 20 MG: 20 TABLET, FILM COATED ORAL at 16:35

## 2022-06-08 RX ADMIN — PANTOPRAZOLE SODIUM 40 MG: 40 TABLET, DELAYED RELEASE ORAL at 06:41

## 2022-06-08 RX ADMIN — INSULIN LISPRO 3 UNITS: 100 INJECTION, SOLUTION INTRAVENOUS; SUBCUTANEOUS at 11:48

## 2022-06-08 RX ADMIN — METHOCARBAMOL 500 MG: 500 TABLET ORAL at 21:39

## 2022-06-08 RX ADMIN — METHOCARBAMOL 500 MG: 500 TABLET ORAL at 08:35

## 2022-06-08 RX ADMIN — ACETAMINOPHEN 325MG 650 MG: 325 TABLET ORAL at 12:05

## 2022-06-08 RX ADMIN — TROSPIUM CHLORIDE 20 MG: 20 TABLET, FILM COATED ORAL at 06:41

## 2022-06-08 RX ADMIN — ASPIRIN 81 MG: 81 TABLET, COATED ORAL at 08:35

## 2022-06-08 RX ADMIN — INSULIN LISPRO 3 UNITS: 100 INJECTION, SOLUTION INTRAVENOUS; SUBCUTANEOUS at 16:37

## 2022-06-08 RX ADMIN — POLYETHYLENE GLYCOL 3350 17 G: 17 POWDER, FOR SOLUTION ORAL at 08:36

## 2022-06-08 RX ADMIN — ENOXAPARIN SODIUM 40 MG: 100 INJECTION SUBCUTANEOUS at 08:35

## 2022-06-08 RX ADMIN — INSULIN LISPRO 2 UNITS: 100 INJECTION, SOLUTION INTRAVENOUS; SUBCUTANEOUS at 21:41

## 2022-06-08 RX ADMIN — INSULIN GLARGINE 19 UNITS: 100 INJECTION, SOLUTION SUBCUTANEOUS at 21:40

## 2022-06-08 RX ADMIN — VALSARTAN 160 MG: 80 TABLET, FILM COATED ORAL at 08:36

## 2022-06-08 RX ADMIN — GABAPENTIN 300 MG: 300 CAPSULE ORAL at 08:35

## 2022-06-08 RX ADMIN — AMLODIPINE BESYLATE 10 MG: 5 TABLET ORAL at 08:35

## 2022-06-08 RX ADMIN — ACETAMINOPHEN 325MG 650 MG: 325 TABLET ORAL at 21:39

## 2022-06-08 RX ADMIN — CYANOCOBALAMIN TAB 500 MCG 1000 MCG: 500 TAB at 08:35

## 2022-06-08 RX ADMIN — INSULIN LISPRO 2 UNITS: 100 INJECTION, SOLUTION INTRAVENOUS; SUBCUTANEOUS at 06:41

## 2022-06-08 RX ADMIN — METHOCARBAMOL 500 MG: 500 TABLET ORAL at 12:06

## 2022-06-08 ASSESSMENT — PAIN - FUNCTIONAL ASSESSMENT: PAIN_FUNCTIONAL_ASSESSMENT: ACTIVITIES ARE NOT PREVENTED

## 2022-06-08 ASSESSMENT — PAIN SCALES - GENERAL
PAINLEVEL_OUTOF10: 0
PAINLEVEL_OUTOF10: 0
PAINLEVEL_OUTOF10: 2
PAINLEVEL_OUTOF10: 4
PAINLEVEL_OUTOF10: 0
PAINLEVEL_OUTOF10: 0
PAINLEVEL_OUTOF10: 7
PAINLEVEL_OUTOF10: 4

## 2022-06-08 ASSESSMENT — PAIN DESCRIPTION - DESCRIPTORS: DESCRIPTORS: ACHING;DULL

## 2022-06-08 ASSESSMENT — PAIN DESCRIPTION - LOCATION: LOCATION: HIP;LEG

## 2022-06-08 NOTE — PROGRESS NOTES
Occupational Therapy  Facility/Department: [facility]  Rehabilitation Occupational Therapy Daily Treatment Note    Date: 22  Patient Name: Mancil Severance       Room: 69/5508-62  MRN: [id number]  Account: [de-identified]   : 1945  (71 y.o.) Gender: male                    Past Medical History:  has a past medical history of Diabetes mellitus ([address] and Hypertension. Past Surgical History:   has no past surgical history on file. Restrictions  Restrictions/Precautions: Weight Bearing;General Precautions; Fall Risk  Left Lower Extremity Weight Bearing: Weight Bearing As Tolerated    Subjective  Subjective: Pt in recliner, pleasant, rates pain at 5/10 in L hip at rest, increasing to 7/10 with movement, /57, HR 75, O2 sats 96%. Restrictions/Precautions: Weight Bearing;General Precautions; Fall Risk             Objective     Cognition  Overall Cognitive Status: Exceptions  Arousal/Alertness: Appropriate responses to stimuli  Following Commands: Follows one step commands with repetition; Follows one step commands with increased time  Attention Span: Attends with cues to redirect  Memory: Decreased recall of recent events;Decreased short term memory;Decreased recall of precautions  Safety Judgement: Decreased awareness of need for safety;Decreased awareness of need for assistance  Problem Solving: Assistance required to identify errors made;Assistance required to generate solutions;Assistance required to implement solutions  Insights: Decreased awareness of deficits  Initiation: Requires cues for some  Sequencing: Requires cues for some  Orientation  Overall Orientation Status: Within Functional Limits   Perception  Overall Perceptual Status: Impaired  Unilateral Attention: Cues to maintain midline in standing  Initiation: Cues to initiate tasks  Motor Planning: Appears intact  Perseveration: Perseverates during conversation           Functional Mobility  Device: Wheelchair  Activity: To/From therapy gym  Assistance Level: Stand by assist  Skilled Clinical Factors: increased time  Bed Mobility  Overall Assistance Level: Supervision  Sit to Supine  Assistance Level: Supervision  Skilled Clinical Factors: cues for straightening self in bed  Scooting  Assistance Level: Supervision  Transfers  Surface: From chair with arms; Wheelchair  Additional Factors: Verbal cues; Hand placement cues; With handrails  Device: Walker  Sit to Stand  Assistance Level: Moderate assistance  Skilled Clinical Factors: CGA initially, fatiguing to mod A toward end of session  Stand to Sit  Assistance Level: Maximum assistance  Skilled Clinical Factors: CGA at times, max A when leaning back heavily  Bed To/From Chair  Technique: Sit pivot  Assistance Level: Minimal assistance  Stand Pivot  Assistance Level: Minimal assistance  Sit Pivot  Assistance Level: Minimal assistance   OT Exercises  Resistive Exercises: 3# weight for shoulder flexion, elbow flexion, wrist flexion, wrist extension, and supination/pronation x20 each  Postural Correction Exercises: core exercises with 4# medicine ball for 2x20 obliques and core rotation     Assessment  Assessment  Assessment: Pt agreeable to OT session. Pt continues to have insight deficits and surprised with pain in L hip/leg with all movements and attempting to stand with LUE on RW and RUE on w/c 75% of the time and then having to switch orientation of UEs to RUE on RW and LUE on w/c to stand each time. Pt stood from w/c with CGA assist for 6/9 stands but required mod A to stand on last few stands due to pain, fatigue, and increased posterior lean. Pt stood for 1:20, 1:30, 1:40, 0:50, 1:10, 1:30, 1:15, and 1:10 during session with each stand beginning with SBA/CGA for balance and then progressively leaning backward as pain increased and pt fatigued, requiring mod/max A for balance and poor ability to correct. Pt verbalized awareness of leaning but states he cannot correct it.  Pt completed BUE and core ther ex with good strength and fair/good coordination and problem solving for puzzle and Lego task in stance. Continue POC. Activity Tolerance: Patient limited by fatigue;Patient limited by pain; Patient limited by endurance  Discharge Recommendations: 24 hour supervision or assist;Home with Home health OT;S Level 1  OT Equipment Recommendations  Equipment Needed: Yes  Mobility Devices: ADL Assistive Devices  ADL Assistive Devices: Toileting - 3-in-1 Commode  Safety Devices  Safety Devices in place: Yes  Type of devices: All fall risk precautions in place;Gait belt;Patient at risk for falls;Call light within reach;Nurse notified; Bed alarm in place; Left in bed    Patient Education  Education  Education Given To: Patient  Education Provided: Role of Therapy; ADL Function;Plan of Care;Transfer Training; Safety; Fall Prevention Strategies; Equipment; Mobility Training;Precautions;DME/Home Modifications;Cognition  Education Provided Comments: POC on ARU, transfer training, safety with transfers, sit<>stands, ther ex, center of gravity, weight shifting  Education Method: Verbal;Demonstration  Barriers to Learning: Cognition  Education Outcome: Verbalized understanding;Demonstrated understanding;Continued education needed    Plan  Plan  Times per Week: 5/7 days/week  Plan Weeks: 3 weeks  Current Treatment Recommendations: Strengthening;ROM;Balance training;Functional mobility training; Wheelchair mobility training; Safety education & training;Neuromuscular re-education;Cognitive reorientation; Endurance training;Pain management;Self-Care / ADL; Home management training;Cognitive/Perceptual training;Coordination training;Equipment evaluation, education, & procurement;Patient/Caregiver education & training    Goals  Short Term Goals  Time Frame for Short term goals: 1 week- 6/10/22  Short Term Goal 1: Pt will perform functional transfers with max A. Short Term Goal 2: Pt will complete toileting with max A.   Short Term Goal 3: Pt will perform LB dressing with mod A. Short Term Goal 4: Pt will complete bathing with mod A. Long Term Goals  Time Frame for Long term goals : 3 weeks- 6/23/22  Long Term Goal 1: Pt will complete functional transfers with CGA. Long Term Goal 2: Pt will perform bathing with CGA. Long Term Goal 3: Pt will complete full body dressing with CGA. Long Term Goal 4: Pt will perform toileting with CGA. Long Term Goal 5: Pt will perform grooming at sink with mod I.       Therapy Time   Individual Concurrent Group Co-treatment   Time In 1230         Time Out 1340         Minutes 70         Timed Code Treatment Minutes: [address] [facility]

## 2022-06-08 NOTE — PROGRESS NOTES
Mancil Severance  6/8/2022  [id number]    Chief Complaint: S/p left hip fracture    Subjective:   No acute events overnight. Today Gibran Tucker is seen in the gym working with therapy. He reports continued leg pain with standing. He has not been taking PRN oxycodone frequently due to it make him fatigued. He denies other acute complaints at this time. Nursing notes patient is reporting cough. XR chest obtained and negative for acute process. Cough drop added. ROS: denies f/c, n/v, cp, sob    Objective:  Patient Vitals for the past 24 hrs:   BP Temp Temp src Pulse Resp SpO2   06/08/22 1106 (!) 122/58 -- -- 72 -- 93 %   06/08/22 0830 (!) 104/57 98 °F (36.7 °C) Oral 78 16 94 %   06/07/22 2245 -- -- -- -- -- 91 %   06/07/22 2145 123/63 97.5 °F (36.4 °C) Oral 68 16 93 %     Gen: No distress, pleasant. Seated in wheelchair  HEENT: Normocephalic, atraumatic. CV: extremities well perfused  Resp: No respiratory distress. Abd: Soft, nontender   Ext: No edema. Neuro: Alert, oriented, appropriately interactive.  Speech fluent    Wt Readings from Last 3 Encounters:   06/06/22 205 lb (93 kg)       Laboratory data:   Lab Results   Component Value Date    WBC 4.7 06/06/2022    HGB 10.5 (L) 06/06/2022    HCT 32.8 (L) 06/06/2022    MCV 78.3 (L) 06/06/2022     06/06/2022       Lab Results   Component Value Date     06/06/2022    K 5.0 06/06/2022     06/06/2022    CO2 31 06/06/2022    BUN 14 06/06/2022    CREATININE 0.9 06/06/2022    GLUCOSE 237 06/06/2022    CALCIUM 8.9 06/06/2022        Therapy progress:  PT     Objective     Sit to Stand: Minimal Assistance  Stand to sit: Minimal Assistance  Bed to Chair: Dependent/Total  Device: Rolling Walker  Assistance: Contact guard assistance,Moderate assistance  Distance: 47 ft, 18 ft ( several 180* turns)  OT  PT Equipment Recommendations  Equipment Needed: Yes  Mobility Devices: Wheelchair  Wheelchair: Standard  Other: CTA, pt may require manual w/c with cushion, pending progression of mobility and gait, pt reports he owns RW, 0TO and electric scooter for community  Equipment Used: Standard toilet  Toilet Transfers Comments: use of Irma Dennis  Assessment        SLP                Body mass index is 31.17 kg/m². Assessment and Plan:  Pepe Dong is a 78year old male with a past medical history significant for DM2, HTN, HLD, BERNARDO, COPD, prostate cancer s/p prostatectomy (2019), and GERD who fell down 2 steps and was found to have left hip and femur fracture on 5/6/22 s/p surgery. He was admitted to [facility] on 6/3/22 due to functional deficits below his baseline.     Left Hip fracture  - s/p repair in [place]  - WBAT  - PT and OT     Post-operative Pain  - tylenol PRN, decreased oxycodone to 2.5 mg PRN  - gabapentin 300 mg BID  - robaxin scheduled    DM2  - increase Lantus to 19 units nightly  - SSI  - has metformin listed as allergy  - family will need training on insulin administration prior to discharge     COPD  - wean oxygen for SpO2>90%     BERNARDO  - CPAP qhs  - respiratory consulted    History of HTN  - home regimen: amlodipine 10 mg, losartan 100 mg  - continue amlodipine  - decrease valsartan to 80 mg daily     HLD  - statin     History of Prostate Cancer  - s/p prostatectomy in 2019  - was recently told that his cancer is back and tentative plan to resume chemo  - oxybutynin discontinued due to concerns this was contributing to confusion  - follow up outpatient     Obesity  - BMI 41   - encourage lifestyle modifications     Bowels: Schedule stool softener. Follow bowel movements. Enema or suppository if needed.      Bladder: Check PVR x 3. [address] if PVR > 200ml or if any volume is > 500 ml.      Sleep: Trazodone provided prn.      Follow up appointments: PCP    Services: home health PT, OT, ST, nursing; 24/7 supervision  EDOD: 6/18/22    Interdisciplinary team conference was held today with entire rehab treatment team including PT, OT, SLP, Dietician, RN, and SW.  Discussion focused on progress toward rehab goals and discharge planning. Barriers: LE weakness, decreased insight, poor activity tolerance, pain. Total treatment time >35 min with greater than 50% spent in care coordination.       Bettina Toledo.  Gonzales Hartman MD 6/8/2022, 2:35 PM

## 2022-06-08 NOTE — PROGRESS NOTES
Speech Language Pathology  [facility]: ACUTE REHAB UNIT  SPEECH-LANGUAGE PATHOLOGY      [x] Daily  [] Weekly Care Conference Note  [] Discharge    Patient:Saravanan Dong      ENQ:[birth year]  ARB:[id number]  Rehab Dx/Hx: S/p left hip fracture [Z87.81]    Precautions: falls  Home situation: pt lives with wife, active , indep with meds, wife manages finances and other household tasks   ST Dx: [] Aphasia  [] Dysarthria  [] Apraxia   [] Oropharyngeal dysphagia [] Cognitive Impairment  [] Other:   Date of Admit: 6/3/2022  Room #: 0151/0151-01    Current functional status (updated daily):         Pt being seen for : [] Speech/Language Treatment  [] Dysphagia Treatment [x] Cognitive Treatment  [] Other:  Communication: [x]WFL  [] Aphasia  [] Dysarthria  [] Apraxia  [] Pragmatic Impairment [] Non-verbal  [] Hearing Loss  [] Other:   Cognition: [] WFL  [x] Mild  [x] Moderate  [] Severe [] Unable to Assess  [] Other:  Memory: [] WFL  [x] Mild  [x] Moderate  [] Severe [] Unable to Assess  [] Other:  Behavior: [x] Alert  [x] Cooperative  [x]  Pleasant  [] Confused  [] Agitated  [] Uncooperative  [] Distractible [] Motivated  [] Self-Limiting [] Anxious  [] Other:  Endurance:  [x] Adequate for participation in SLP sessions  [] Reduced overall  [] Lethargic  [] Other:  Safety: [] No concerns at this time  [x] Reduced insight into deficits  [x]  Reduced safety awareness [] Not following call light procedures  [] Unable to Assess  [] Other:    Current Diet Order:ADULT DIET;  Regular; 5 carb choices (75 gm/meal)  Swallowing Precautions: upright for all intake, stay upright for at least 30 mins after intake, small bites/sips, alternate bites/sips and slow rate of intake        Date: 6/8/2022      Tx session 1  [id number] Tx session 2  All tx needs met in session 1    Total Timed Code Min 60    Total Treatment Minutes 60    Individual Treatment Minutes 60    Group Treatment Minutes 0    Co-Treat Minutes 0    Variance/Reason:  N/A Pain Denies     Pain Intervention [] RN notified  [] Repositioned  [] Intervention offered and patient declined  [x] N/A  [] Other: [] RN notified  [] Repositioned  [] Intervention offered and patient declined  [] N/A  [] Other:   Subjective     Pt alert and oriented, cooperative and agreeable to participate in therapy. Pt seen sitting upright in bedside chair. Objective:  Goals     Dysphagia Goals:  Short-term Goals  Timeframe for Short-term Goals: 14 days (6/18/22)     Goal 1. The patient will tolerate recommended diet without observed clinical signs of aspiration. Did not target. Reported no difficulties. Goal 2. The patient/caregiver will demonstrate understanding of compensatory strategies for improved swallowing safety. Did not target. Speech/Lang/Cog Goals:  Short-term Goals  Timeframe for Short-term Goals: 14 days (6/18/22)    Goal 1: Pt will complete graded recall tasks using compensatory strategies with 80% acc given mod cues. Functional recall/mental manipulation task:  -pt given three words and asked to repeat them in order that they occur  -ex: October, April, February= February, April, October  -77% acc indep improving to 100% acc given min cues  -pt benefited from occasional repetition of word sets for improved recall      Goal 2: Pt will complete executive function tasks (meds, money, math, time, etc) with 80% acc given mod cues.        Medication management task with use of AM/PM organizer:  -simple and complex sorting  -75% acc indep improving to 100% acc given min cues  -discussed look back method after sorting each pill to double check accuracy  -discussed placing pill bottles on left side of organizer to begin with, once filled for the week, placing bottles on right side and flipping upside down so that pt is aware pills have been sorted  -pt verbalized understanding      Goal 3: Pt will complete verbal and visual reasoning tasks with 80% acc given mod cues   Did not target. Goal 4: Pt will complete problem solving and thought organization tasks with 80% acc given mod cues   6 step picture sequencing cards:  -33% acc indep improving to 100% acc given mod-max cues   -pt benefited from cues for attention to detail and thought organization strategies      Goal 5: Pt will complete graded attention tasks (e.g. sustained, selective, alternative, divided) with 80% acc given min cues   Indirectly targeted. Overall WFL attention throughout duration of tx session. Other areas targeted: N/A    Education:   edu provided re: importance of med management, rationale for tx tasks provided      Safety Devices: [x] Call light within reach  [x] Chair alarm activated  [] Bed alarm activated  [] Other: [] Call light within reach  [] Chair alarm activated  [] Bed alarm activated  [] Other:    Assessment: Pt pleasant and cooperative, agreeable to participate. Pt completed medication management ask with use of AM/PM organizer (simple and complex sorting) with 75% acc indep improving to 100% acc given min cues. Discussed strategies of how to keep track of what meds have already been sorted etc. Pt completed functional recall task of 3 word sets with 77% acc indep improving to 100% acc given min cues, benefiting from use of repetition strategy. Pt demonstrated increased difficulty with 6 step picture sequencing, requiring increased cues for attention to detail and thought organization. Pt is making good progress towards goals and overall improved insight. Continue goals above. Plan: Continue as per plan of care.       Additional Information:     Barriers toward progress: Confusion and Cognitive deficit  Discharge recommendations:  [] Home independently  [] Home with assistance [x]  24 hour supervision  [] ECF [] Other:  Continued Tx Upon Discharge: ? [x] Yes [] No [] TBD based on progress while on ARU [] Vital Stim indicated [] Other:   Estimated discharge date: TBD    Interventions used this date:  [] Speech/Language Treatment  [] Instruction in HEP [] Group [] Dysphagia Treatment [x] Cognitive Treatment   [] Other: Total Time Breakdown / Charges    Time in Time out Total Time / units   Cognitive Tx 0830 0930 60 min/ 4 units    Speech Tx -- -- --   Dysphagia Tx -- -- --       Electronically Signed by     Yeny Quinn. A CCC-SLP  Speech-Language Pathologist  [id number]

## 2022-06-08 NOTE — PROGRESS NOTES
Physical Therapy  Facility/Department: [facility]  Rehabilitation Physical Therapy Treatment Note    NAME: Chelsie Dhillon  : 1945 (78 y.o.)  MRN: [id number]  CODE STATUS: Full Code    Date of Service: 22       Restrictions:  Restrictions/Precautions: Weight Bearing;General Precautions; Fall Risk  Lower Extremity Weight Bearing Restrictions  Left Lower Extremity Weight Bearing: Weight Bearing As Tolerated     SUBJECTIVE  Subjective  Subjective: found in recliner  Pain: 7/10 pain LLE          OBJECTIVE  Cognition  Insights: Decreased awareness of deficits  Orientation  Overall Orientation Status: Within Functional Limits  Orientation Level: Oriented to person;Oriented to situation;Oriented to time;Oriented to place    Functional Mobility      Vitals obtained sitting in recliner    Sit to stand recliner to Rw with multiple attempts and CGA    Gait x 36 ft, 40 ft with RW, with assist ranging from CGA-min A. Pt presents with antalgic gait pattern, step to pattern unless cued (maintains for 2-3 ft before regressing to step to), significantly decreased step height/length and heel strike. Gait limited by pain. As distance increases, pt exhibits increased trunk/hip/knee flexion with difficulty returning to upright posture  W/c mobility x 100 ft with BUE and BLE, increased time and supv. Occasional cues to navigate arauz obstacles/ awareness. Pt completed 2x10 RLE step ups onto 6\" step with BUE support on rails and CGA-min A. Pt with multiple minor posterior LOB upon returning to ground with improvement when therapist provided tactile cues for trunk/hip extension. Completed for increased strengthening, cues for safe sequencing. Pt completed 2 min of forward/backwards stepping with BUE support on // bars, mirror for visual feedback for trunk extension. Pt requires cues/ visual cue to maintain shoulder width STEVEN, decreased step height/length.      Pt completed x10 reps of LLE reciprocal step to target with cues for heel strike and forward weight shift    Pt completed 2x10 of RLE reciprocal step to target with cues for heel strike/ forward weight shift and sagittal plane motion of LEs . Gait x 50 ft with RW, CGA-min a initially with reciprocal pattern and trunk extension but regresses to step to pattern, shuffled antalgic gait with increased hip/knee flexion. Stand pivot w/c to recliner with RW and CGA  Pt in recliner with alarm donned and call light/needs within reach. ASSESSMENT/PROGRESS TOWARDS GOALS  Vital Signs  Heart Rate: 72  Heart Rate Source: Monitor  BP: (!) 122/58  BP Location: Right upper arm  MAP (Calculated): 79.33  SpO2: 93 %  O2 Device: None (Room air)    Assessment  Assessment: pt found in recliner, agreeable to session. pt limited by c/o LLE pain however tolerates session with use of seated rests. grossly CGA with multiple attempts for transfers, CGA-min A for gait up to 50 ft with RW with cues for improved gait mechanics. Limited carryover of education provided. focus of session includes strengthening and improving gait mechanics. continue to rec home with 24/7 at this time and HHPT vs OPPT pending progress. DME: RW, potential need for manual wc pending progress/ tolerance to gait. Activity Tolerance: Patient limited by fatigue;Patient limited by pain; Patient limited by endurance  Discharge Recommendations: 24 hour supervision or assist;Home with Home health PT  PT Equipment Recommendations  Equipment Needed: Yes  Wheelchair: Standard  Other: CTA, pt may require manual w/c with cushion, pending progression of mobility and gait, pt reports he owns RW, 8DA and electric scooter for community    Goals  Patient Goals   Patient goals :  \"Get stronger so I can get outside and be able to do things I on my own\"  Short Term Goals  Time Frame for Short term goals: 10 days 6/12/22  Short term goal 1: Pt will complete supine to/from sit with Cristobal  Short term goal 2: Pt will complete sit to/from stand with RW with Cristobal  Short term goal 3: Pt will complete bed <> chair with RW with modA  Short term goal 4: Pt will ambulate x 15' with RW with modA without LOB  Long Term Goals  Time Frame for Long term goals : 21 days 6/23/22  Long term goal 1: Pt will complete supine to/from sit with SBA  Long term goal 2: Pt will complete functional t/f with RW with CGA  Long term goal 3: Pt will ambulate x 48' with RW with CGA without LOB  Long term goal 4: Pt will complete car t/f with RW and CGA    PLAN OF CARE/SAFETY  Plan  Plan: 5-7 times per week  Plan weeks: 3 weeks  Current Treatment Recommendations: Strengthening; Wheelchair mobility training;Equipment evaluation, education, & procurement;Balance training;Gait training;Pain management; Modalities;Stair training;Functional mobility training;Transfer training;Neuromuscular re-education;Home exercise program;Positioning; Safety education & training;Manual Therapy - Soft Tissue Mobilization;Patient/Caregiver education & training; Therapeutic activities; Endurance training;Manual Therapy - Joint Manipulation;Cognitive reorientation  Safety Devices  Type of Devices: All fall risk precautions in place;Call light within reach; Patient at risk for falls;Gait belt;Nurse notified; Left in chair;Chair alarm in place    EDUCATION  Education  Education Given To: Patient  Education Provided: Role of Therapy;Plan of Care;Precautions; Safety; Fall Prevention Strategies;DME/Home Modifications;Transfer Training;Equipment;ADL Function;Cognition;Mobility Training  Education Provided Comments: pt educated on importance of OOB mobility, need for safety with fatigue and buckling, safe use of hand placement and RW - requires reinforcement  Barriers to Learning: Cognition; Hearing  Education Outcome: Continued education needed;Verbalized understanding; Unable to demonstrate understanding        Therapy Time   Individual Concurrent Group Co-treatment   Time In 1000         Time Out 1100         Minutes 60 Timed Code Treatment Minutes: 61 Minutes       Vani Edge, PT, 06/08/22 at 11:09 AM

## 2022-06-08 NOTE — PLAN OF CARE
Problem: Pain  Goal: Verbalizes/displays adequate comfort level or baseline comfort level  Outcome: Progressing  Note: Pt a/o, rates pain appropriately using 0-10 pain scale; pt calls out as needed for pain intervention; will continue to monitor and administer intervention as ordered and requested.      Problem: Safety - Adult  Goal: Free from fall injury  Outcome: Progressing

## 2022-06-09 LAB
ANION GAP SERPL CALCULATED.3IONS-SCNC: 6 MMOL/L (ref 3–16)
BASOPHILS ABSOLUTE: 0 K/UL (ref 0–0.2)
BASOPHILS RELATIVE PERCENT: 0.3 %
BUN BLDV-MCNC: 14 MG/DL (ref 7–20)
CALCIUM SERPL-MCNC: 8.4 MG/DL (ref 8.3–10.6)
CHLORIDE BLD-SCNC: 103 MMOL/L (ref 99–110)
CO2: 28 MMOL/L (ref 21–32)
CREAT SERPL-MCNC: 0.8 MG/DL (ref 0.8–1.3)
EOSINOPHILS ABSOLUTE: 0.4 K/UL (ref 0–0.6)
EOSINOPHILS RELATIVE PERCENT: 5 %
GFR AFRICAN AMERICAN: >60
GFR NON-AFRICAN AMERICAN: >60
GLUCOSE BLD-MCNC: 205 MG/DL (ref 70–99)
GLUCOSE BLD-MCNC: 214 MG/DL (ref 70–99)
GLUCOSE BLD-MCNC: 237 MG/DL (ref 70–99)
GLUCOSE BLD-MCNC: 243 MG/DL (ref 70–99)
GLUCOSE BLD-MCNC: 262 MG/DL (ref 70–99)
HCT VFR BLD CALC: 31.6 % (ref 40.5–52.5)
HEMOGLOBIN: 10.1 G/DL (ref 13.5–17.5)
LYMPHOCYTES ABSOLUTE: 1.3 K/UL (ref 1–5.1)
LYMPHOCYTES RELATIVE PERCENT: 18.2 %
MCH RBC QN AUTO: 24.9 PG (ref 26–34)
MCHC RBC AUTO-ENTMCNC: 31.8 G/DL (ref 31–36)
MCV RBC AUTO: 78.4 FL (ref 80–100)
MONOCYTES ABSOLUTE: 0.4 K/UL (ref 0–1.3)
MONOCYTES RELATIVE PERCENT: 6.1 %
NEUTROPHILS ABSOLUTE: 5.1 K/UL (ref 1.7–7.7)
NEUTROPHILS RELATIVE PERCENT: 70.4 %
PDW BLD-RTO: 16.5 % (ref 12.4–15.4)
PERFORMED ON: ABNORMAL
PLATELET # BLD: 178 K/UL (ref 135–450)
PMV BLD AUTO: 8.4 FL (ref 5–10.5)
POTASSIUM REFLEX MAGNESIUM: 4.7 MMOL/L (ref 3.5–5.1)
RBC # BLD: 4.03 M/UL (ref 4.2–5.9)
SODIUM BLD-SCNC: 137 MMOL/L (ref 136–145)
WBC # BLD: 7.3 K/UL (ref 4–11)

## 2022-06-09 PROCEDURE — 92526 ORAL FUNCTION THERAPY: CPT

## 2022-06-09 PROCEDURE — 97116 GAIT TRAINING THERAPY: CPT

## 2022-06-09 PROCEDURE — 6370000000 HC RX 637 (ALT 250 FOR IP): Performed by: STUDENT IN AN ORGANIZED HEALTH CARE EDUCATION/TRAINING PROGRAM

## 2022-06-09 PROCEDURE — 36415 COLL VENOUS BLD VENIPUNCTURE: CPT

## 2022-06-09 PROCEDURE — 97110 THERAPEUTIC EXERCISES: CPT

## 2022-06-09 PROCEDURE — 97530 THERAPEUTIC ACTIVITIES: CPT

## 2022-06-09 PROCEDURE — 6360000002 HC RX W HCPCS: Performed by: STUDENT IN AN ORGANIZED HEALTH CARE EDUCATION/TRAINING PROGRAM

## 2022-06-09 PROCEDURE — 97535 SELF CARE MNGMENT TRAINING: CPT

## 2022-06-09 PROCEDURE — 97129 THER IVNTJ 1ST 15 MIN: CPT

## 2022-06-09 PROCEDURE — 1280000000 HC REHAB R&B

## 2022-06-09 PROCEDURE — 97130 THER IVNTJ EA ADDL 15 MIN: CPT

## 2022-06-09 PROCEDURE — 85025 COMPLETE CBC W/AUTO DIFF WBC: CPT

## 2022-06-09 PROCEDURE — 80048 BASIC METABOLIC PNL TOTAL CA: CPT

## 2022-06-09 RX ORDER — INSULIN LISPRO 100 [IU]/ML
0-12 INJECTION, SOLUTION INTRAVENOUS; SUBCUTANEOUS
Status: DISCONTINUED | OUTPATIENT
Start: 2022-06-09 | End: 2022-06-18 | Stop reason: HOSPADM

## 2022-06-09 RX ORDER — INSULIN LISPRO 100 [IU]/ML
0-6 INJECTION, SOLUTION INTRAVENOUS; SUBCUTANEOUS NIGHTLY
Status: DISCONTINUED | OUTPATIENT
Start: 2022-06-09 | End: 2022-06-18 | Stop reason: HOSPADM

## 2022-06-09 RX ORDER — INSULIN GLARGINE 100 [IU]/ML
22 INJECTION, SOLUTION SUBCUTANEOUS NIGHTLY
Status: DISCONTINUED | OUTPATIENT
Start: 2022-06-09 | End: 2022-06-18 | Stop reason: HOSPADM

## 2022-06-09 RX ORDER — SENNA AND DOCUSATE SODIUM 50; 8.6 MG/1; MG/1
2 TABLET, FILM COATED ORAL DAILY
Status: DISCONTINUED | OUTPATIENT
Start: 2022-06-09 | End: 2022-06-18 | Stop reason: HOSPADM

## 2022-06-09 RX ADMIN — TRAZODONE HYDROCHLORIDE 50 MG: 50 TABLET ORAL at 20:54

## 2022-06-09 RX ADMIN — INSULIN GLARGINE 22 UNITS: 100 INJECTION, SOLUTION SUBCUTANEOUS at 20:55

## 2022-06-09 RX ADMIN — METHOCARBAMOL 500 MG: 500 TABLET ORAL at 20:54

## 2022-06-09 RX ADMIN — METHOCARBAMOL 500 MG: 500 TABLET ORAL at 09:10

## 2022-06-09 RX ADMIN — ENOXAPARIN SODIUM 40 MG: 100 INJECTION SUBCUTANEOUS at 09:10

## 2022-06-09 RX ADMIN — VALSARTAN 80 MG: 80 TABLET, FILM COATED ORAL at 09:10

## 2022-06-09 RX ADMIN — GABAPENTIN 300 MG: 300 CAPSULE ORAL at 20:54

## 2022-06-09 RX ADMIN — GABAPENTIN 300 MG: 300 CAPSULE ORAL at 09:10

## 2022-06-09 RX ADMIN — ATORVASTATIN CALCIUM 40 MG: 40 TABLET, FILM COATED ORAL at 20:54

## 2022-06-09 RX ADMIN — ACETAMINOPHEN 325MG 650 MG: 325 TABLET ORAL at 20:54

## 2022-06-09 RX ADMIN — TROSPIUM CHLORIDE 20 MG: 20 TABLET, FILM COATED ORAL at 16:13

## 2022-06-09 RX ADMIN — PANTOPRAZOLE SODIUM 40 MG: 40 TABLET, DELAYED RELEASE ORAL at 06:29

## 2022-06-09 RX ADMIN — SERTRALINE 100 MG: 50 TABLET, FILM COATED ORAL at 09:10

## 2022-06-09 RX ADMIN — OXYCODONE 2.5 MG: 5 TABLET ORAL at 17:58

## 2022-06-09 RX ADMIN — METHOCARBAMOL 500 MG: 500 TABLET ORAL at 12:38

## 2022-06-09 RX ADMIN — INSULIN LISPRO 4 UNITS: 100 INJECTION, SOLUTION INTRAVENOUS; SUBCUTANEOUS at 16:16

## 2022-06-09 RX ADMIN — INSULIN LISPRO 2 UNITS: 100 INJECTION, SOLUTION INTRAVENOUS; SUBCUTANEOUS at 06:28

## 2022-06-09 RX ADMIN — INSULIN LISPRO 2 UNITS: 100 INJECTION, SOLUTION INTRAVENOUS; SUBCUTANEOUS at 20:56

## 2022-06-09 RX ADMIN — ASPIRIN 81 MG: 81 TABLET, COATED ORAL at 09:10

## 2022-06-09 RX ADMIN — METHOCARBAMOL 500 MG: 500 TABLET ORAL at 16:13

## 2022-06-09 RX ADMIN — INSULIN LISPRO 6 UNITS: 100 INJECTION, SOLUTION INTRAVENOUS; SUBCUTANEOUS at 11:47

## 2022-06-09 RX ADMIN — TROSPIUM CHLORIDE 20 MG: 20 TABLET, FILM COATED ORAL at 06:29

## 2022-06-09 RX ADMIN — AMLODIPINE BESYLATE 10 MG: 5 TABLET ORAL at 09:10

## 2022-06-09 RX ADMIN — CYANOCOBALAMIN TAB 500 MCG 1000 MCG: 500 TAB at 09:10

## 2022-06-09 ASSESSMENT — PAIN DESCRIPTION - ORIENTATION: ORIENTATION: RIGHT;LEFT

## 2022-06-09 ASSESSMENT — PAIN DESCRIPTION - LOCATION: LOCATION: LEG

## 2022-06-09 ASSESSMENT — PAIN - FUNCTIONAL ASSESSMENT: PAIN_FUNCTIONAL_ASSESSMENT: ACTIVITIES ARE NOT PREVENTED

## 2022-06-09 ASSESSMENT — PAIN SCALES - GENERAL
PAINLEVEL_OUTOF10: 3
PAINLEVEL_OUTOF10: 7
PAINLEVEL_OUTOF10: 7
PAINLEVEL_OUTOF10: 0
PAINLEVEL_OUTOF10: 6

## 2022-06-09 ASSESSMENT — PAIN DESCRIPTION - DESCRIPTORS: DESCRIPTORS: ACHING

## 2022-06-09 NOTE — PROGRESS NOTES
Comprehensive Nutrition Assessment    Type and Reason for Visit:  Reassess    Nutrition Recommendations/Plan:   1. Modify diet to 4 carb choices per meal to assist with BG   2. Encourage PO intakes   3. Monitor diet education needs/acceptance of education   4. Monitor nutrition adequacy, pertinent labs, bowel habits, wt changes, and clinical progress     Malnutrition Assessment:  Malnutrition Status: At risk for malnutrition (Comment) (06/04/22 1304)    Context:  Acute Illness       Nutrition Assessment:    Follow up: Pt nutritionally improving AEB good appetite and PO intakes of mainly % this admission. Blood sugars remain elevated this admission. Attempted carb control diet education, pt declined. Handouts left at bedside as family coming in later today, RD contact information provided if questions arise. Recommend modifying diet to lower carb/meal to assist with decreasing BG. Will continue to monitor. Nutrition Related Findings:    BM x2 on 6/8. Hypoactive BS. Trace BLE edema. -280 past 24 hrs. Wound Type: Surgical Incision       Current Nutrition Intake & Therapies:    Average Meal Intake: 1-25%,26-50%,51-75%,%  Average Supplements Intake: None Ordered  ADULT DIET; Regular; 5 carb choices (75 gm/meal)    Anthropometric Measures:  Height: 5' 8\" (172.7 cm)  Ideal Body Weight (IBW): 154 lbs (70 kg)       Current Body Weight: 205 lb (93 kg), 145.5 % IBW. Weight Source: Not Specified  Current BMI (kg/m2): 31.2  Usual Body Weight: 220 lb (99.8 kg) (per pt)  % Weight Change (Calculated): 1.8                    BMI Categories: Obese Class 1 (BMI 30.0-34. 9)    Estimated Daily Nutrient Needs:  Energy Requirements Based On: Kcal/kg (25-30)  Weight Used for Energy Requirements: Ideal  Energy (kcal/day): 0944-9065 kcal  Weight Used for Protein Requirements: Ideal  Protein (g/day): 70-84 g  Method Used for Fluid Requirements: 1 ml/kcal  Fluid (ml/day): 5220-6216 mL    Nutrition Diagnosis:   · Increased nutrient needs related to increase demand for energy/nutrients as evidenced by  (increased therapy regimen, s/p surgery)      Nutrition Interventions:   Food and/or Nutrient Delivery: Modify Current Diet  Nutrition Education/Counseling: Education declined (handouts left at bedside)  Coordination of Nutrition Care: Continue to monitor while inpatient,Interdisciplinary Rounds  Plan of Care discussed with: Patient    Goals:  Previous Goal Met: Progressing toward Goal(s)  Goals: PO intake 50% or greater,prior to discharge       Nutrition Monitoring and Evaluation:   Behavioral-Environmental Outcomes: Readiness for Change,Beliefs and Attitutes  Food/Nutrient Intake Outcomes: Food and Nutrient Intake  Physical Signs/Symptoms Outcomes: Biochemical Data,Nutrition Focused Physical Findings,Weight    Discharge Planning:    Continue current diet     Pk [address], [address],   Contact: [phone]

## 2022-06-09 NOTE — PROGRESS NOTES
Occupational Therapy  Facility/Department: [facility]  Rehabilitation Occupational Therapy Daily Treatment Note    Date: 22  Patient Name: Lida Escobedo       Room: 5134/6172-11  MRN: [id number]  Account: [de-identified]   : 1945  (71 y.o.) Gender: male                    Past Medical History:  has a past medical history of Diabetes mellitus ([address] and Hypertension. Past Surgical History:   has no past surgical history on file. Restrictions  Restrictions/Precautions: Weight Bearing;General Precautions; Fall Risk  Left Lower Extremity Weight Bearing: Weight Bearing As Tolerated    Subjective  Subjective: Pt in bed, eating breakfast, pleasant, reports no pain at rest, /57, HR 71, O2 sats 95% on RA  Restrictions/Precautions: Weight Bearing;General Precautions; Fall Risk             Objective     Cognition  Overall Cognitive Status: Exceptions  Arousal/Alertness: Appropriate responses to stimuli  Following Commands: Follows one step commands with repetition; Follows one step commands with increased time  Attention Span: Attends with cues to redirect  Memory: Decreased recall of recent events;Decreased short term memory;Decreased recall of precautions  Safety Judgement: Decreased awareness of need for safety;Decreased awareness of need for assistance  Problem Solving: Assistance required to identify errors made;Assistance required to generate solutions;Assistance required to implement solutions  Insights: Decreased awareness of deficits  Initiation: Requires cues for some  Sequencing: Requires cues for some  Orientation  Overall Orientation Status: Within Functional Limits   Perception  Overall Perceptual Status: Impaired  Unilateral Attention: Cues to maintain midline in standing  Initiation: Cues to initiate tasks  Motor Planning: Appears intact  Perseveration: Perseverates during conversation     ADL  Feeding  Assistance Level:  Independent  Grooming/Oral Hygiene  Assistance Level: Modified independent  Skilled Clinical Factors: seated at sink to comb hair and brush teeth  Upper Extremity Bathing  Assistance Level: Supervision  Lower Extremity Bathing  Equipment Provided: Long-handled sponge  Assistance Level: Contact guard assist  Skilled Clinical Factors: CGA in stance when bathing buttocks, cues for use of long handled sponge to bathe lower legs and feet  Upper Extremity Dressing  Assistance Level: Supervision  Skilled Clinical Factors: cues to pull down fully in back  Lower Extremity Dressing  Equipment Provided: Reachers  Assistance Level: Minimal assistance;Verbal cues  Skilled Clinical Factors: CGA to stand to pull over hips, min A to thread RLE into correct leg of shorts, fair use of reacher to thread BLEs into brief with cues  Putting On/Taking Off Footwear  Equipment Provided: Sock aid  Assistance Level: Supervision  Skilled Clinical Factors: cues for use of sock aid, setup to thread L sock onto aid, min A for donning shoes to don L foot fully into shoe  Tub/Shower Transfers  Type: Shower  Transfer From: [place]  Transfer To: Tub transfer bench  Additional Factors: With handrails;Verbal cues;Cues for hand placement  Assistance Level: Minimal assistance  Skilled Clinical Factors: CGA into shower with min VCs for AD management, min A out of shower          Functional Mobility  Device: Rolling walker  Activity: To/From bathroom  Assistance Level: Contact guard assist  Bed Mobility  Overall Assistance Level: Supervision  Supine to Sit  Assistance Level: Supervision  Skilled Clinical Factors: use of bed rails and HOB elevated. Transfers  Surface: From chair with arms; Wheelchair  Additional Factors: Verbal cues; Hand placement cues; With handrails  Device: Walker  Sit to Stand  Assistance Level: Minimal assistance  Skilled Clinical Factors: CGA initially, fatiguing to min A toward end of session  Stand to Sit  Assistance Level: Minimal assistance  Bed To/From Chair  Assistance Level: Minimal assistance  Stand Pivot  Assistance Level: Minimal assistance   OT Exercises  Resistive Exercises: 3# weight for forward/backward rows x20, 4# core exercises for obliques and core rotation  Circulation/Endurance Exercises: 2x5 sit<>stands     Assessment  Assessment  Assessment: Pt agreeable to OT session. Pt performed mobility bed>bathroom with RW and CGA. Pt required min VCs for safety with RW when entering walk-in shower. Pt completed bathing with CGA and dressing with min A and mod VCs for use of reacher, sock aid, and long handled sponge. Pt completed grooming with mod I while seated in w/c at sink. Pt completed core exercises with good strength and good coordination for hitting ball with 3# dowel renetta. Pt BP ranging from 118/57 in bed to 108/50 at EOB to 112/52 after walking to bathroom, 122/47 after bathing and 98/49 in recliner at end of session. Minor c/o dizziness during session but no heavy posterior LOB this date. Continue POC. Activity Tolerance: Patient limited by fatigue;Patient limited by pain; Patient limited by endurance  Discharge Recommendations: 24 hour supervision or assist;Home with Home health OT;S Level 1  OT Equipment Recommendations  Equipment Needed: Yes  Mobility Devices: ADL Assistive Devices  ADL Assistive Devices: Toileting - 3-in-1 Commode  Safety Devices  Safety Devices in place: Yes  Type of devices: All fall risk precautions in place;Gait belt;Patient at risk for falls;Call light within reach;Nurse notified; Left in chair;Chair alarm in place    Patient Education  Education  Education Given To: Patient  Education Provided: Role of Therapy; ADL Function;Plan of Care;Transfer Training; Safety; Fall Prevention Strategies; Equipment; Mobility Training;Precautions;DME/Home Modifications;Cognition  Education Provided Comments: POC on ARU, transfer training, safety with transfers, sit<>stands, ther ex, center of gravity, weight shifting  Education Method: Verbal;Demonstration  Barriers to Learning: Cognition  Education Outcome: Verbalized understanding;Demonstrated understanding;Continued education needed    Plan  Plan  Times per Week: 5/7 days/week  Plan Weeks: 3 weeks  Current Treatment Recommendations: Strengthening;ROM;Balance training;Functional mobility training; Wheelchair mobility training; Safety education & training;Neuromuscular re-education;Cognitive reorientation; Endurance training;Pain management;Self-Care / ADL; Home management training;Cognitive/Perceptual training;Coordination training;Equipment evaluation, education, & procurement;Patient/Caregiver education & training    Goals  Short Term Goals  Time Frame for Short term goals: 1 week- 6/10/22  Short Term Goal 1: Pt will perform functional transfers with max A. GOAL MET 6/9/22 Pt performed functional transfers with min A. Short Term Goal 2: Pt will complete toileting with max A. Short Term Goal 3: Pt will perform LB dressing with mod A. GOAL MET 6/9/22 Pt performed LB dressing with min A. Short Term Goal 4: Pt will complete bathing with mod A. GOAL MET 6/9/22 Pt completed bathing with CGA. Long Term Goals  Time Frame for Long term goals : 3 weeks- 6/23/22  Long Term Goal 1: Pt will complete functional transfers with CGA. Long Term Goal 2: Pt will perform bathing with CGA. Long Term Goal 3: Pt will complete full body dressing with CGA. Long Term Goal 4: Pt will perform toileting with CGA. Long Term Goal 5: Pt will perform grooming at sink with mod I.       Therapy Time   Individual Concurrent Group Co-treatment   Time In 0730         Time Out 0900         Minutes 90         Timed Code Treatment Minutes: Mehreen Coe

## 2022-06-09 NOTE — PROGRESS NOTES
Mancil Severance  6/9/2022  [id number]    Chief Complaint: S/p left hip fracture    Subjective:   No acute events overnight. Today Gibran Tucker is seen in his room working with Speech. He reports minimal leg pain when sitting, but reports increased pain with standing. He does feel he is tolerating more activity. He also reports dry cough. XR chest unremarkable. Encouraged patient to use IS. ROS: denies f/c, n/v, cp, sob    Objective:  Patient Vitals for the past 24 hrs:   BP Temp Temp src Pulse Resp SpO2   06/09/22 0907 110/62 98.1 °F (36.7 °C) Oral 76 18 94 %   06/09/22 0715 111/62 -- -- 67 -- 94 %   06/08/22 1945 121/64 98.2 °F (36.8 °C) Oral 74 18 95 %   06/08/22 1106 (!) 122/58 -- -- 72 -- 93 %     Gen: No distress, pleasant. Seated in chair  HEENT: Normocephalic, atraumatic. CV: extremities well perfused  Resp: No respiratory distress. On room air  Abd: Soft, nontender   Ext: No edema. Neuro: Alert, oriented, appropriately interactive.  Speech fluent, delayed processing    Wt Readings from Last 3 Encounters:   06/06/22 205 lb (93 kg)       Laboratory data:   Lab Results   Component Value Date    WBC 7.3 06/09/2022    HGB 10.1 (L) 06/09/2022    HCT 31.6 (L) 06/09/2022    MCV 78.4 (L) 06/09/2022     06/09/2022       Lab Results   Component Value Date     06/09/2022    K 4.7 06/09/2022     06/09/2022    CO2 28 06/09/2022    BUN 14 06/09/2022    CREATININE 0.8 06/09/2022    GLUCOSE 237 06/09/2022    CALCIUM 8.4 06/09/2022        Therapy progress:  PT     Objective     Sit to Stand: Minimal Assistance  Stand to sit: Minimal Assistance  Bed to Chair: Dependent/Total  Device: Rolling Walker  Assistance: Contact guard assistance,Moderate assistance  Distance: 47 ft, 18 ft ( several 180* turns)  OT  PT Equipment Recommendations  Equipment Needed: Yes  Mobility Devices: Wheelchair  Wheelchair: Standard  Other: CTA, pt may require manual w/c with cushion, pending progression of mobility and gait, pt reports he owns RW, 7RW and electric scooter for [employer] Used: Standard toilet  Toilet Transfers Comments: use of Isaiah Krishnamurthy  Assessment        SLP                Body mass index is 31.17 kg/m². Assessment and Plan:  Mago Morgan is a 78year old male with a past medical history significant for DM2, HTN, HLD, BERNARDO, COPD, prostate cancer s/p prostatectomy (2019), and GERD who fell down 2 steps and was found to have left hip and femur fracture on 5/6/22 s/p surgery. He was admitted to [facility] on 6/3/22 due to functional deficits below his baseline.     Left Hip fracture  - s/p repair in [place]  - WBAT  - PT and OT     Post-operative Pain  - tylenol PRN, oxycodone to 2.5 mg PRN  - gabapentin 300 mg BID  - robaxin scheduled    DM2  - increased Lantus to 22 units nightly  - increased SSI to medium dose correction  - has metformin listed as allergy  - reports he was only taking Lantus at home. Do not plan to discharge on SSI due to concerns about ability to accurately administer   - family will need training on insulin administration prior to discharge     COPD  - adequately oxygenating on room   - wean oxygen for SpO2>90%     BERNARDO  - CPAP qhs  - respiratory consulted    History of HTN  - home regimen: amlodipine 10 mg, losartan 100 mg  - continue amlodipine  - decreased valsartan to 80 mg daily     HLD  - statin     History of Prostate Cancer  - s/p prostatectomy in 2019  - was recently told that his cancer is back and tentative plan to resume chemo  - oxybutynin discontinued due to concerns this was contributing to confusion  - follow up outpatient     Obesity  - BMI 41   - encourage lifestyle modifications     Bowels: Schedule stool softener. Follow bowel movements. Enema or suppository if needed.      Bladder: Check PVR x 3.   [address] if PVR > 200ml or if any volume is > 500 ml.      Sleep: Trazodone provided prn.      Follow up appointments: PCP    Services: home health PT, OT, ST, nursing; 24/7 supervision  EDOD: 6/18/22     [address] [place] [address]  Kendra Thakkar MD 6/9/2022, 10:40 AM
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
## 2022-06-09 NOTE — PROGRESS NOTES
Physical Therapy  Facility/Department: [facility]  Rehabilitation Physical Therapy Treatment Note    NAME: Xochilt Pereira  : 1945 (78 y.o.)  MRN: [id number]  CODE STATUS: Full Code    Date of Service: 22       Restrictions:  Restrictions/Precautions: Weight Bearing; Fall Risk;General Precautions  Lower Extremity Weight Bearing Restrictions  Right Lower Extremity Weight Bearing: Weight Bearing As Tolerated     SUBJECTIVE  Subjective  Subjective: pt found in recliner. pt reports moderate pain in L LE. pt is willing to particiapte in PT         OBJECTIVE  Cognition  Overall Cognitive Status: WFL  Arousal/Alertness: Appropriate responses to stimuli  Following Commands: Follows one step commands with repetition; Follows one step commands with increased time  Attention Span: Attends with cues to redirect  Memory: Decreased recall of recent events;Decreased short term memory;Decreased recall of precautions  Safety Judgement: Decreased awareness of need for assistance  Problem Solving: Assistance required to identify errors made;Assistance required to generate solutions;Assistance required to implement solutions  Insights: Decreased awareness of deficits  Initiation: Requires cues for some  Sequencing: Requires cues for some  Orientation  Overall Orientation Status: Within Functional Limits    Functional Mobility    Vitals assessed in recliner at start of session    STS transfer w/CGA from [facility] to [facility]. Pt demonstrates improved weight shift with transfers today and is able to attain stability in initial standing. x80' + 94' gait training with CGA-Jake and RW. Pt continues to demonstrate a step to gait pattern when weight bearing on the L LE. PT cues pt to increase step length/height and improve posture as pt tends to flex trunk/hip/knee throughout gait.  Pt tends to compensate for reduced weight bearing tolerance by stepping quickly which leads to instances of knee buckling requiring Jake for recovery of balance. STS transfer w/CGA from [place] [id number] to . Neuro re-ed: begin shoulder width STEVEN on floor-> RLE step onto 1st 6\" step-> LLE reciprocal step onto 2nd 6\" step-> return to start position. Completed 2x5  BHR with CGA. Pt demonstrates improved ability to shift weight forward. Pt is still limited by weakness and fatigue. Pt cued to maintain upright posture and prevent hip and knee flexion in stance. STS transfer w/CGA from [facility] to RW.    2x5 targeted RLE reciprocal gait pattern in // bars (blocked practice). Pt cued on heel strike and appropriate step through with R LE. LLE in stance phase for increased strengthening. Visual Cues for wide STEVEN     x10 standing march each leg with 1 -2 UE support on // bars and CGA. Pt cued to lift knees for greater foot clearance and to maintain upright posture. Pt continues to bend at the trunk and look toward his feet for visual aid for foot positioning. x15 seated marches, SLR, heel-toe raises, lateral marches, knee extensions. Cues provided to focus on proper muscle activation and slowed movement for control. 35' walk back to room with RW, CGA-min A. Pt again demonstrates step to pattern when weight bearing on LLE. PT provides cues for upright posture and improved gait mechanics but pt reports pain and fatigue in L thigh. ASSESSMENT/PROGRESS TOWARDS GOALS  Vital Signs  Heart Rate: 68  Heart Rate Source: Monitor  BP: (!) 113/55  BP Location: Left upper arm  MAP (Calculated): 74.33  SpO2: 96 %    Assessment  Assessment: pt continues to demonstrate antalgic gait with L LE weakness and reduced tolerance weight bearing. pt is CGA for transfers, CGA-Jake for gait up to 100ft with RW. pt demonstrates reduced step length/height when weight bearing on L LE. pt also demonstrates flexion of trunk/hip/knee when ambulating and buckling of the L knee occasionally. PT cues pt for upright posture and slow controlled gait for increased stepping strategies.  continue to rec home with HHPT vs OPPT depending on progression. DME: RW, and potential need for manual wc. Activity Tolerance: Patient limited by fatigue;Patient limited by pain; Patient limited by endurance  Discharge Recommendations: 24 hour supervision or assist;Home with Home health PT  PT Equipment Recommendations  Equipment Needed: Yes  Wheelchair: Standard  Other: CTA, pt may require manual w/c with cushion, pending progression of mobility and gait, pt reports he owns RW, 7WL and electric scooter for community    Goals  Patient Goals   Patient goals : \"Get stronger so I can get outside and be able to do things I on my own\"  Short Term Goals  Time Frame for Short term goals: 10 days 6/12/22  Short term goal 1: Pt will complete supine to/from sit with Cristobal  Short term goal 2: Pt will complete sit to/from stand with RW with Cristobal  Short term goal 3: Pt will complete bed <> chair with RW with modA  Short term goal 4: Pt will ambulate x 15' with RW with modA without LOB  Long Term Goals  Time Frame for Long term goals : 21 days 6/23/22  Long term goal 1: Pt will complete supine to/from sit with SBA  Long term goal 2: Pt will complete functional t/f with RW with CGA  Long term goal 3: Pt will ambulate x 48' with RW with CGA without LOB  Long term goal 4: Pt will complete car t/f with RW and CGA    PLAN OF CARE/SAFETY  Plan  Plan: 5-7 times per week  Specific Instructions for Next Treatment: Progress mobility as tolerated  Current Treatment Recommendations: Strengthening; Wheelchair mobility training;Equipment evaluation, education, & procurement;Balance training;Gait training;Pain management; Modalities;Stair training;Functional mobility training;Transfer training;Neuromuscular re-education;Home exercise program;Positioning; Safety education & training;Manual Therapy - Soft Tissue Mobilization;Patient/Caregiver education & training; Therapeutic activities; Endurance training;Manual Therapy - Joint Manipulation;Cognitive

## 2022-06-09 NOTE — PROGRESS NOTES
Speech Language Pathology  [facility]: ACUTE REHAB UNIT  SPEECH-LANGUAGE PATHOLOGY      [x] Daily  [] Weekly Care Conference Note  [] Discharge    Patient:Saravanan Ho      OZN:0/5/6149  RAR:[id number]  Rehab Dx/Hx: S/p left hip fracture [Z87.81]    Precautions: falls  Home situation: pt lives with wife, active , indep with meds, wife manages finances and other household tasks   ST Dx: [] Aphasia  [] Dysarthria  [] Apraxia   [] Oropharyngeal dysphagia [] Cognitive Impairment  [] Other:   Date of Admit: 6/3/2022  Room #: 0151/0151-01    Current functional status (updated daily):         Pt being seen for : [] Speech/Language Treatment  [x] Dysphagia Treatment [x] Cognitive Treatment  [] Other:  Communication: [x]WFL  [] Aphasia  [] Dysarthria  [] Apraxia  [] Pragmatic Impairment [] Non-verbal  [] Hearing Loss  [] Other:   Cognition: [] WFL  [x] Mild  [x] Moderate  [] Severe [] Unable to Assess  [] Other:  Memory: [] WFL  [x] Mild  [x] Moderate  [] Severe [] Unable to Assess  [] Other:  Behavior: [x] Alert  [x] Cooperative  [x]  Pleasant  [] Confused  [] Agitated  [] Uncooperative  [] Distractible [] Motivated  [] Self-Limiting [] Anxious  [] Other:  Endurance:  [x] Adequate for participation in SLP sessions  [] Reduced overall  [] Lethargic  [] Other:  Safety: [] No concerns at this time  [x] Reduced insight into deficits  [x]  Reduced safety awareness [] Not following call light procedures  [] Unable to Assess  [] Other:    Current Diet Order:ADULT DIET;  Regular; 5 carb choices (75 gm/meal)  Swallowing Precautions: upright for all intake, stay upright for at least 30 mins after intake, small bites/sips, alternate bites/sips and slow rate of intake        Date: 6/9/2022      Tx session 1  8155-4000 Tx session 2  All tx needs met in session 1    Total Timed Code Min 50    Total Treatment Minutes 60    Individual Treatment Minutes 60    Group Treatment Minutes 0    Co-Treat Minutes 0    Variance/Reason:  N/A Pain Denies     Pain Intervention [] RN notified  [] Repositioned  [] Intervention offered and patient declined  [x] N/A  [] Other: [] RN notified  [] Repositioned  [] Intervention offered and patient declined  [] N/A  [] Other:   Subjective     Pt alert and oriented, cooperative and agreeable to participate in therapy. Pt seen sitting upright in bedside chair. Objective:  Goals     Dysphagia Goals:  Short-term Goals  Timeframe for Short-term Goals: 14 days (6/18/22)     Goal 1. The patient will tolerate recommended diet without observed clinical signs of aspiration. Precious Sue cracker trials:  -pt presented with adequate mastication, A-P transit, min residue in oral cavity post swallow that pt was able to clear, no s/s of aspiration    TL via cup:  -timely swallow initiation, no overt s/s of aspiration  -no coughing, wet vocal quality, or throat clearing observed    Per chart review and speaking with pt, no difficulties with current diet recommendations. Goal met 06/09/22. Pt tolerating IDDSI Level 7 regular solids with thin liquids, meds whole with PO as LRD. Goal 2. The patient/caregiver will demonstrate understanding of compensatory strategies for improved swallowing safety. SLP and pt discussed and reviewed current diet recommendations and importance of safe swallow strategies during all meals. Pt verbalized and demonstrated understanding during PO trials. Goal met 06/09/22. Speech/Lang/Cog Goals:  Short-term Goals  Timeframe for Short-term Goals: 14 days (6/18/22)    Goal 1: Pt will complete graded recall tasks using compensatory strategies with 80% acc given mod cues.    Functional recall/mental manipulation task:  -pt given 12 pictures of words (3 categories, 4 pictures in each category)   -ex: picture of clothing items: shirt, pants, coat, socks  - pt 91% indep  - 100% acc given min phonetic cue  -pt benefited from extra time to process/think of pictures      Goal 2: Pt will complete executive function tasks (meds, money, math, time, etc) with 80% acc given mod cues. Time management task  -simple and complex functional daily life problems  EX: It takes you 2 hours to make dinner. You want to eat dinner at 6:00pm. What time should you start making dinner?  - 81% accuracy indep  -pt verbalized understanding of why functional time problems were being addressed during tx      Goal 3: Pt will complete verbal and visual reasoning tasks with 80% acc given mod cues   Verbal concrete/abstract reasoning tasks  - EX: add an ending to the story: The movie started. The lights went out. ...  - 100% accuracy story completions indep      Goal 4: Pt will complete problem solving and thought organization tasks with 80% acc given mod cues   [place]/abstract thought organization tasks  - 100% accuracy indep concrete organization (ex: add another to this category- bird, lion, hamster)  - 50% accuracy indep abstract reasoning (add one more to the category: blood, stop sign, strawberry)  - pt improve to 82% accuracy with min cues      Goal 5: Pt will complete graded attention tasks (e.g. sustained, selective, alternative, divided) with 80% acc given min cues   Indirectly targeted. Overall WFL attention throughout duration of tx session. Other areas targeted: N/A    Education:   edu provided re: diet recs, safe swallow strategies, rationale for tx tasks provided       Safety Devices: [x] Call light within reach  [x] Chair alarm activated  [] Bed alarm activated  [] Other: [] Call light within reach  [] Chair alarm activated  [] Bed alarm activated  [] Other:    Assessment: Pt was seen for cognitive tx this date. Pt seen alert and oriented within chair. Pt completed multiple cognitive tasks: abstract organization, concrete organization, verbal reasoning, executive functioning (time), and recall. Pt complete recall task with 91% acc indep.  Pt completed concrete organizational/reasoning task with 100% acc indep. Abstract reasoning task completed with 50% acc indep, improving to 82% acc with min cues. Executive functioning (daily time task) completed with 81% accuracy. Dr came in during visit, pt told dr that he will be in charge of giving own insulin at home after d/c. Pt stated that grandson will be driving him mostly. Pt observed with several regular solids PO trials and TL via cup, tolerating without any difficulty or s/s of aspiration- all dysphagia goals met this date. Continue goals above. Plan: Continue as per plan of care. Additional Information:     Barriers toward progress: Confusion and Cognitive deficit  Discharge recommendations:  [] Home independently  [] Home with assistance [x]  24 hour supervision  [] ECF [] Other:  Continued Tx Upon Discharge: ? [x] Yes [] No [] TBD based on progress while on ARU [] Vital Stim indicated [] Other:   Estimated discharge date: TBD    Interventions used this date:  [] Speech/Language Treatment  [] Instruction in HEP [] Group [x] Dysphagia Treatment [x] Cognitive Treatment   [] Other: Total Time Breakdown / Charges    Time in Time out Total Time / units   Cognitive Tx 0940 1030 50 mins / 3 units   Speech Tx -- -- --   Dysphagia Tx 0930 0940 10 mins/ 1 unit       Electronically Signed by     Ailyn FOREMAN CCC-SLP  Speech Language Pathologist  [id number]    Maria Dolores PALACIOS CCC-SLP  Speech-Language Pathologist  [id number]

## 2022-06-09 NOTE — PLAN OF CARE
Problem: Skin/Tissue Integrity  Goal: Absence of new skin breakdown  Description: 1. Monitor for areas of redness and/or skin breakdown    Problem: Pain  Goal: Verbalizes/displays adequate comfort level or baseline comfort level  Outcome: Progressing  Note: Pt a/o, rates pain appropriately using 0-10 pain scale; pt calls out as needed for pain intervention; will continue to monitor and administer intervention as ordered and requested.      Problem: Safety - Adult  Goal: Free from fall injury  6/9/2022 1425 by Ines Dempsey RN  Outcome: Progressing

## 2022-06-10 LAB
GLUCOSE BLD-MCNC: 139 MG/DL (ref 70–99)
GLUCOSE BLD-MCNC: 223 MG/DL (ref 70–99)
GLUCOSE BLD-MCNC: 223 MG/DL (ref 70–99)
GLUCOSE BLD-MCNC: 244 MG/DL (ref 70–99)
PERFORMED ON: ABNORMAL

## 2022-06-10 PROCEDURE — 97530 THERAPEUTIC ACTIVITIES: CPT

## 2022-06-10 PROCEDURE — 97110 THERAPEUTIC EXERCISES: CPT

## 2022-06-10 PROCEDURE — 6360000002 HC RX W HCPCS: Performed by: STUDENT IN AN ORGANIZED HEALTH CARE EDUCATION/TRAINING PROGRAM

## 2022-06-10 PROCEDURE — 97130 THER IVNTJ EA ADDL 15 MIN: CPT

## 2022-06-10 PROCEDURE — 6370000000 HC RX 637 (ALT 250 FOR IP): Performed by: STUDENT IN AN ORGANIZED HEALTH CARE EDUCATION/TRAINING PROGRAM

## 2022-06-10 PROCEDURE — 97116 GAIT TRAINING THERAPY: CPT

## 2022-06-10 PROCEDURE — 97129 THER IVNTJ 1ST 15 MIN: CPT

## 2022-06-10 PROCEDURE — 1280000000 HC REHAB R&B

## 2022-06-10 RX ADMIN — AMLODIPINE BESYLATE 10 MG: 5 TABLET ORAL at 09:41

## 2022-06-10 RX ADMIN — SERTRALINE 100 MG: 50 TABLET, FILM COATED ORAL at 09:41

## 2022-06-10 RX ADMIN — INSULIN GLARGINE 22 UNITS: 100 INJECTION, SOLUTION SUBCUTANEOUS at 21:43

## 2022-06-10 RX ADMIN — INSULIN LISPRO 4 UNITS: 100 INJECTION, SOLUTION INTRAVENOUS; SUBCUTANEOUS at 12:10

## 2022-06-10 RX ADMIN — TRAZODONE HYDROCHLORIDE 50 MG: 50 TABLET ORAL at 21:42

## 2022-06-10 RX ADMIN — TROSPIUM CHLORIDE 20 MG: 20 TABLET, FILM COATED ORAL at 06:12

## 2022-06-10 RX ADMIN — OXYCODONE 2.5 MG: 5 TABLET ORAL at 18:30

## 2022-06-10 RX ADMIN — GABAPENTIN 300 MG: 300 CAPSULE ORAL at 09:30

## 2022-06-10 RX ADMIN — VALSARTAN 80 MG: 80 TABLET, FILM COATED ORAL at 09:41

## 2022-06-10 RX ADMIN — INSULIN LISPRO 2 UNITS: 100 INJECTION, SOLUTION INTRAVENOUS; SUBCUTANEOUS at 21:43

## 2022-06-10 RX ADMIN — ATORVASTATIN CALCIUM 40 MG: 40 TABLET, FILM COATED ORAL at 21:42

## 2022-06-10 RX ADMIN — METHOCARBAMOL 500 MG: 500 TABLET ORAL at 09:41

## 2022-06-10 RX ADMIN — INSULIN LISPRO 4 UNITS: 100 INJECTION, SOLUTION INTRAVENOUS; SUBCUTANEOUS at 16:38

## 2022-06-10 RX ADMIN — ASPIRIN 81 MG: 81 TABLET, COATED ORAL at 09:43

## 2022-06-10 RX ADMIN — ENOXAPARIN SODIUM 40 MG: 100 INJECTION SUBCUTANEOUS at 09:30

## 2022-06-10 RX ADMIN — METHOCARBAMOL 500 MG: 500 TABLET ORAL at 21:42

## 2022-06-10 RX ADMIN — PANTOPRAZOLE SODIUM 40 MG: 40 TABLET, DELAYED RELEASE ORAL at 06:12

## 2022-06-10 RX ADMIN — CYANOCOBALAMIN TAB 500 MCG 1000 MCG: 500 TAB at 09:41

## 2022-06-10 RX ADMIN — GABAPENTIN 300 MG: 300 CAPSULE ORAL at 21:42

## 2022-06-10 RX ADMIN — TROSPIUM CHLORIDE 20 MG: 20 TABLET, FILM COATED ORAL at 16:35

## 2022-06-10 RX ADMIN — METHOCARBAMOL 500 MG: 500 TABLET ORAL at 13:31

## 2022-06-10 RX ADMIN — METHOCARBAMOL 500 MG: 500 TABLET ORAL at 16:38

## 2022-06-10 ASSESSMENT — PAIN SCALES - GENERAL
PAINLEVEL_OUTOF10: 0
PAINLEVEL_OUTOF10: 3
PAINLEVEL_OUTOF10: 4
PAINLEVEL_OUTOF10: 4
PAINLEVEL_OUTOF10: 0

## 2022-06-10 ASSESSMENT — PAIN SCALES - WONG BAKER
WONGBAKER_NUMERICALRESPONSE: 0

## 2022-06-10 ASSESSMENT — PAIN - FUNCTIONAL ASSESSMENT
PAIN_FUNCTIONAL_ASSESSMENT: ACTIVITIES ARE NOT PREVENTED

## 2022-06-10 ASSESSMENT — PAIN DESCRIPTION - LOCATION
LOCATION: HIP
LOCATION: HIP;KNEE

## 2022-06-10 ASSESSMENT — PAIN DESCRIPTION - ORIENTATION
ORIENTATION: LEFT
ORIENTATION: LEFT

## 2022-06-10 ASSESSMENT — PAIN DESCRIPTION - ONSET: ONSET: ON-GOING

## 2022-06-10 ASSESSMENT — PAIN DESCRIPTION - DESCRIPTORS
DESCRIPTORS: ACHING
DESCRIPTORS: ACHING;SORE

## 2022-06-10 ASSESSMENT — PAIN DESCRIPTION - FREQUENCY: FREQUENCY: CONTINUOUS

## 2022-06-10 ASSESSMENT — PAIN DESCRIPTION - PAIN TYPE: TYPE: ACUTE PAIN;SURGICAL PAIN

## 2022-06-10 NOTE — CARE COORDINATION
MIRNA spoke with Chapin Melton from [facility] with updated family dynamics and contact information for daughter and wife. Eliezer Pryor, RN     8130 MIRNA spoke with Liza Fountain (daughter) via telephone with DCP options. Dinorah Berger stated she did speak with Flora from [facility] with DCP supplemental support. Dinorah Berger stated that her father is not safe to come home and the wife will not provide the 24 hour care that he will need at home. Wife has left patient after he has fallen in the shower for hours at a time per daughter, she also stated that this has happened over the last several years. MIRNA acknowledged and provided support.  Eliezer Pryor, RN

## 2022-06-10 NOTE — PROGRESS NOTES
Occupational Therapy  Facility/Department: [facility]  Rehabilitation Occupational Therapy Daily Treatment Note    Date: 6/10/22  Patient Name: Chapis Craig       Room: [facility]5/8387-  MRN: [id number]  Account: [de-identified]   : 1945  (71 y.o.) Gender: male                    Past Medical History:  has a past medical history of Diabetes mellitus ([address] and Hypertension. Past Surgical History:   has no past surgical history on file. Restrictions  Restrictions/Precautions: Weight Bearing; Fall Risk;General Precautions  Right Lower Extremity Weight Bearing: Weight Bearing As Tolerated  Left Lower Extremity Weight Bearing: Weight Bearing As Tolerated    Subjective  Subjective: Pt in recliner, pleasant, feeling well today, minimal pain (5/10), /61, HR 72, O2 sats 97% on RA. Restrictions/Precautions: Weight Bearing; Fall Risk;General Precautions             Objective     Cognition  Overall Cognitive Status: Exceptions  Arousal/Alertness: Appropriate responses to stimuli  Following Commands: Follows one step commands with repetition; Follows one step commands with increased time  Attention Span: Attends with cues to redirect  Memory: Decreased recall of recent events;Decreased short term memory;Decreased recall of precautions  Safety Judgement: Decreased awareness of need for assistance  Problem Solving: Assistance required to identify errors made;Assistance required to generate solutions;Assistance required to implement solutions  Insights: Decreased awareness of deficits  Initiation: Requires cues for some  Sequencing: Requires cues for some  Orientation  Overall Orientation Status: Within Functional Limits   Perception  Overall Perceptual Status: Impaired  Unilateral Attention: Cues to maintain midline in standing  Initiation: Cues to initiate tasks  Motor Planning: Appears intact  Perseveration: Perseverates during conversation           Functional Mobility  Device: Rolling walker  Activity: To/From therapy gym  Assistance Level: Contact guard assist  Skilled Clinical Factors: SBA/CGA  Transfers  Surface: From chair with arms; Wheelchair  Additional Factors: Verbal cues; Hand placement cues; With handrails  Device: Walker  Sit to Stand  Assistance Level: Contact guard assist  Stand to Sit  Assistance Level: Stand by assist  Bed To/From Chair  Technique: Sit pivot  Assistance Level: Contact guard assist  Stand Pivot  Assistance Level: Contact guard assist  Sit Pivot  Assistance Level: Contact guard assist   OT Exercises  Resistive Exercises: 3# weight x20 for wrist flexion, wrist extension, supination/pronation, shoulder flexion, shoulder horizontal abduction, chest press     Assessment  Assessment  Assessment: Pt demonstrated improved balance this date to stand for 3-4 minutes x2 with CGA/SBA and no LOB. Pt demonstrated ability to stand without UE support to remove beads with BUEs simultaneously. Pt completed sit<>stands with CGA and mobility in w/c with SPV. Pt completed w/c mobility with improved speed and coordination. Pt completed beaded task with min VCs for problem solving. Second session: Pt performed functional mobility with SBA for 3 minutes and 114 feet. Pt completed mobility to toss and collect bean bags for 2-3 minutes x2 with CGA and good sequence of 4 colors. Pt performed BUE ther ex with good strength and fair strength and good coordination for EZ Exerboard. Continue POC. Activity Tolerance: Patient limited by fatigue;Patient limited by pain; Patient limited by endurance  Discharge Recommendations: 24 hour supervision or assist;Home with Home health OT;S Level 1  OT Equipment Recommendations  Equipment Needed: Yes  Mobility Devices: ADL Assistive Devices  ADL Assistive Devices: Toileting - 3-in-1 Commode  Safety Devices  Safety Devices in place: Yes  Type of devices: All fall risk precautions in place;Gait belt;Patient at risk for falls;Call light within reach;Nurse notified; Left in chair;Chair alarm in place    Patient Education  Education  Education Given To: Patient  Education Provided: Role of Therapy; ADL Function;Plan of Care;Transfer Training; Safety; Fall Prevention Strategies; Equipment; Mobility Training;Precautions;DME/Home Modifications;Cognition  Education Provided Comments: POC on ARU, transfer training, safety with transfers, sit<>stands, ther ex, center of gravity, weight shifting  Education Method: Verbal;Demonstration  Barriers to Learning: Cognition  Education Outcome: Verbalized understanding;Demonstrated understanding;Continued education needed    Plan  Plan  Times per Week: 5/7 days/week  Plan Weeks: 3 weeks  Current Treatment Recommendations: Strengthening;ROM;Balance training;Functional mobility training; Wheelchair mobility training; Safety education & training;Neuromuscular re-education;Cognitive reorientation; Endurance training;Pain management;Self-Care / ADL; Home management training;Cognitive/Perceptual training;Coordination training;Equipment evaluation, education, & procurement;Patient/Caregiver education & training    Goals  Short Term Goals  Time Frame for Short term goals: 1 week- 6/10/22  Short Term Goal 1: Pt will perform functional transfers with max A. GOAL MET 6/9/22 Pt performed functional transfers with min A. Short Term Goal 2: Pt will complete toileting with max A. GOAL MET 6/10/22 Pt completed toileting with max A with assist of nursing per patient report. Short Term Goal 3: Pt will perform LB dressing with mod A. GOAL MET 6/9/22 Pt performed LB dressing with min A. Short Term Goal 4: Pt will complete bathing with mod A. GOAL MET 6/9/22 Pt completed bathing with CGA. Long Term Goals  Time Frame for Long term goals : 3 weeks- 6/23/22  Long Term Goal 1: Pt will complete functional transfers with CGA. Long Term Goal 2: Pt will perform bathing with CGA. Long Term Goal 3: Pt will complete full body dressing with CGA. Long Term Goal 4: Pt will perform toileting with CGA.   Long Term Goal 5: Pt will perform grooming at sink with mod I.       Therapy Time   Individual Concurrent Group Co-treatment   Time In 1100         Time Out 1130         Minutes 30         Timed Code Treatment Minutes: 30 Minutes    Second Session Therapy Time:   Individual Concurrent Group Co-treatment   Time In 1230        Time Out 1330        Minutes 60          Timed Code Treatment Minutes:  60 Minutes    Total Treatment Minutes:  90 minutes      Carol Cortez OT

## 2022-06-10 NOTE — CARE COORDINATION
CM spoke with Catalina Garcia from [facility] patient is 90% eligible. CM faxed documents to [facility] for review for additional rehab after IP ARU stay. CM sent referral to [facility] a [facility] contracted facility per request from daughter. CM received call from [facility] and stated that they will approve for two weeks at an approved SNF but not approved for LTC. CM acknowledged. Estella Colon RN

## 2022-06-10 NOTE — PROGRESS NOTES
Speech Language Pathology  [facility]: ACUTE REHAB UNIT  SPEECH-LANGUAGE PATHOLOGY      [x] Daily  [] Weekly Care Conference Note  [] Discharge    Patient:Saravanan Ardon      DFJ:8/0/0645  PNM:[id number]  Rehab Dx/Hx: S/p left hip fracture [Z87.81]    Precautions: falls  Home situation: pt lives with wife, active , indep with meds, wife manages finances and other household tasks   ST Dx: [] Aphasia  [] Dysarthria  [] Apraxia   [] Oropharyngeal dysphagia [] Cognitive Impairment  [] Other:   Date of Admit: 6/3/2022  Room #: 0151/0151-01    Current functional status (updated daily):         Pt being seen for : [] Speech/Language Treatment  [x] Dysphagia Treatment [x] Cognitive Treatment  [] Other:  Communication: [x]WFL  [] Aphasia  [] Dysarthria  [] Apraxia  [] Pragmatic Impairment [] Non-verbal  [] Hearing Loss  [] Other:   Cognition: [] WFL  [x] Mild  [x] Moderate  [] Severe [] Unable to Assess  [] Other:  Memory: [] WFL  [x] Mild  [x] Moderate  [] Severe [] Unable to Assess  [] Other:  Behavior: [x] Alert  [x] Cooperative  [x]  Pleasant  [] Confused  [] Agitated  [] Uncooperative  [] Distractible [] Motivated  [] Self-Limiting [] Anxious  [] Other:  Endurance:  [x] Adequate for participation in SLP sessions  [] Reduced overall  [] Lethargic  [] Other:  Safety: [] No concerns at this time  [x] Reduced insight into deficits  [x]  Reduced safety awareness [] Not following call light procedures  [] Unable to Assess  [] Other:    Current Diet Order:ADULT DIET;  Regular; 4 carb choices (60 gm/meal)  Swallowing Precautions: upright for all intake, stay upright for at least 30 mins after intake, small bites/sips, alternate bites/sips and slow rate of intake        Date: 6/10/2022      Tx session 1  [id number] Tx session 2  All tx needs met in session 1    Total Timed Code Min 60    Total Treatment Minutes 60    Individual Treatment Minutes 60    Group Treatment Minutes 0    Co-Treat Minutes 0    Variance/Reason:  N/A Pain Denies     Pain Intervention [] RN notified  [] Repositioned  [] Intervention offered and patient declined  [x] N/A  [] Other: [] RN notified  [] Repositioned  [] Intervention offered and patient declined  [] N/A  [] Other:   Subjective     Pt alert and oriented, cooperative and agreeable to participate in therapy. Pt seen sitting upright in bedside chair. Objective:  Goals           Goal met 06/09/22. Pt tolerating IDDSI Level 7 regular solids with thin liquids, meds whole with PO as LRD. Goal met 06/09/22. Speech/Lang/Cog Goals:  Short-term Goals  Timeframe for Short-term Goals: 14 days (6/18/22)    Goal 1: Pt will complete graded recall tasks using compensatory strategies with 80% acc given mod cues. SLUMS:  -immediate recall: 80% acc indep improving to 100% acc given min cues   -short term recall: 80% acc indep improving to 100% acc given min cues     Goal 2: Pt will complete executive function tasks (meds, money, math, time, etc) with 80% acc given mod cues. SLUMS:  -money calculations: 50% acc indep improving to 100% acc given mod cues  -clock drawing: numbers and hand placement accurate    Subtest of TORY (assesment of language related functional activities)  -understanding medicine labels: 100% acc indep  -using a calendar: 90% acc indep improving to 100% acc given min cues  -reading instructions: 100% acc indep      Goal 3: Pt will complete verbal and visual reasoning tasks with 80% acc given mod cues   Category exclusion:  -pt given four words and asked to determine which item didn't belong  -80% acc indep improving to 100% acc given min cues       Goal 4: Pt will complete problem solving and thought organization tasks with 80% acc given mod cues   Indirectly targeted throughout tx tasks, see goals above. Goal 5: Pt will complete graded attention tasks (e.g. sustained, selective, alternative, divided) with 80% acc given min cues   Goal met 06/10/22.  Overall [facility] attention throughout tx session. Other areas targeted: N/A    Education:   Edu provided re: results of SLUMS, ongoing use of strategies      Safety Devices: [x] Call light within reach  [x] Chair alarm activated  [] Bed alarm activated  [] Other: [] Call light within reach  [] Chair alarm activated  [] Bed alarm activated  [] Other:    Assessment: Pt pleasant and cooperative, agreeable to participate. Pt completed SLUMS today scoring a 26/30. Pt initially scored a 14/30 on the MoCA when given on 06/04. Overall improvement noted with recall, thought organization, attention problem solving, and executive functioning. Pt also completed several subtest of the TORY (see above) scoring between % acc indep. Pt's wife present during end of tx session, discussion had with pt and pt's wife regarding 24 hour assist at home, assist with meds/finances, etc. Pt's wife aware but also stated she works full time. SLP spoke with  after tx session. Pt is making consistent progress towards goals. Continue goals above. Plan: Continue as per plan of care. Additional Information:     Barriers toward progress: Confusion and Cognitive deficit  Discharge recommendations:  [] Home independently  [] Home with assistance [x]  24 hour supervision  [] ECF [] Other:  Continued Tx Upon Discharge: ? [x] Yes [] No [] TBD based on progress while on ARU [] Vital Stim indicated [] Other:   Estimated discharge date: TBD    Interventions used this date:  [] Speech/Language Treatment  [] Instruction in HEP [] Group [x] Dysphagia Treatment [x] Cognitive Treatment   [] Other: Total Time Breakdown / Charges    Time in Time out Total Time / units   Cognitive Tx 0930 1030 60 min/ 4 units    Speech Tx -- -- --   Dysphagia Tx -- -- --       Electronically Signed by     Nettie PALACIOS CCC-SLP  Speech-Language Pathologist  [id number]

## 2022-06-10 NOTE — PROGRESS NOTES
Elke Stevenson  6/10/2022  [id number]    Chief Complaint: S/p left hip fracture    Subjective: Patient seen this AM.  He is feeling better with his left hip pain complaints, and only has aching at this time. He is doing better in his therapies with his transfers and gait. Speech is working with the patient in his room this morning. Repeat labs yesterday look good and are stable. His blood sugars have been running high, but his Lantus dose was adjusted and his blood sugars are better this morning. ROS: denies f/c, n/v, cp, sob    Objective:  Patient Vitals for the past 24 hrs:   BP Temp Temp src Pulse Resp SpO2   06/10/22 0745 (!) 151/71 97.8 °F (36.6 °C) Oral 81 16 94 %   06/09/22 1945 115/61 98.3 °F (36.8 °C) Oral 71 18 95 %   06/09/22 1136 (!) 113/55 -- -- 68 -- 96 %   06/09/22 1135 (!) 113/55 -- -- 68 -- --     Gen: No distress, pleasant. Seated in chair  HEENT: Normocephalic, atraumatic. CV: extremities well perfused  Resp: No respiratory distress. On room air  Abd: Soft, nontender   Ext: No edema. Neuro: Alert, oriented, appropriately interactive.  Speech fluent, delayed processing    Wt Readings from Last 3 Encounters:   06/06/22 205 lb (93 kg)       Laboratory data:   Lab Results   Component Value Date    WBC 7.3 06/09/2022    HGB 10.1 (L) 06/09/2022    HCT 31.6 (L) 06/09/2022    MCV 78.4 (L) 06/09/2022     06/09/2022       Lab Results   Component Value Date     06/09/2022    K 4.7 06/09/2022     06/09/2022    CO2 28 06/09/2022    BUN 14 06/09/2022    CREATININE 0.8 06/09/2022    GLUCOSE 237 06/09/2022    CALCIUM 8.4 06/09/2022        Therapy progress:  PT     Objective     Sit to Stand: Minimal Assistance  Stand to sit: Minimal Assistance  Bed to Chair: Dependent/Total  Device: Rolling Walker  Assistance: Contact guard assistance,Moderate assistance  Distance: 47 ft, 18 ft ( several 180* turns)  OT  PT Equipment Recommendations  Equipment Needed: Yes  Mobility Devices: Wheelchair  Wheelchair: Standard  Other: CTA, pt may require manual w/c with cushion, pending progression of mobility and gait, pt reports he owns RW, 5LH and electric scooter for [employer] Used: Standard toilet  Toilet Transfers Comments: use of Erasmo Kumar  Assessment        SLP    Pt was seen for cognitive tx this date. Pt seen alert and oriented within chair. Pt completed multiple cognitive tasks: abstract organization, concrete organization, verbal reasoning, executive functioning (time), and recall. Pt complete recall task with 91% acc indep. Pt completed concrete organizational/reasoning task with 100% acc indep. Abstract reasoning task completed with 50% acc indep, improving to 82% acc with min cues. Executive functioning (daily time task) completed with 81% accuracy. Dr came in during visit, pt told dr that he will be in charge of giving own insulin at home after d/c. Pt stated that grandson will be driving him mostly. Pt observed with several regular solids PO trials and TL via cup, tolerating without any difficulty or s/s of aspiration- all dysphagia goals met this date. Continue goals above. Body mass index is 31.17 kg/m². Assessment and Plan:  Leonel Corey is a 78year old male with a past medical history significant for DM2, HTN, HLD, BERNARDO, COPD, prostate cancer s/p prostatectomy (2019), and GERD who fell down 2 steps and was found to have left hip and femur fracture on 5/6/22 s/p surgery. He was admitted to [facility] on 6/3/22 due to functional deficits below his baseline.     Left Hip fracture  - s/p repair in [place]  - WBAT  - PT and OT     Post-operative Pain  - tylenol PRN, oxycodone to 2.5 mg PRN  - gabapentin 300 mg BID  - robaxin scheduled    DM2  - increased Lantus to 22 units nightly  - increased SSI to medium dose correction  - has metformin listed as allergy  - reports he was only taking Lantus at home.  Do not plan to discharge on SSI due to concerns about ability to accurately administer   - family will need training on insulin administration prior to discharge     COPD  - adequately oxygenating on room   - wean oxygen for SpO2>90%     BERNARDO  - CPAP qhs  - respiratory consulted    History of HTN  - home regimen: amlodipine 10 mg, losartan 100 mg  - continue amlodipine  - decreased valsartan to 80 mg daily     HLD  - statin     History of Prostate Cancer  - s/p prostatectomy in 2019  - was recently told that his cancer is back and tentative plan to resume chemo  - oxybutynin discontinued due to concerns this was contributing to confusion  - follow up outpatient     Obesity  - BMI 41   - encourage lifestyle modifications     Bowels: Schedule stool softener. Follow bowel movements. Enema or suppository if needed.      Bladder: Check PVR x 3. [address] if PVR > 200ml or if any volume is > 500 ml.      Sleep: Trazodone provided prn.      Follow up appointments: PCP    Services: home health PT, OT, ST, nursing; 24/7 supervision  EDOD: 6/18/22     Adilia Galvin MD 6/10/2022, 9:54 AM

## 2022-06-10 NOTE — PROGRESS NOTES
Physical Therapy  Facility/Department: [facility]  Rehabilitation Physical Therapy Treatment Note    NAME: Fly Stapleton  : 1945 (78 y.o.)  MRN: [id number]  CODE STATUS: Full Code    Date of Service: 6/10/22       Restrictions:  Restrictions/Precautions: Weight Bearing; Fall Risk;General Precautions  Lower Extremity Weight Bearing Restrictions  Right Lower Extremity Weight Bearing: Weight Bearing As Tolerated  Left Lower Extremity Weight Bearing: Weight Bearing As Tolerated     SUBJECTIVE  Subjective  Subjective: pt found in bed  Pain: 6/10 LLE pain       OBJECTIVE  Cognition  Overall Cognitive Status: WFL  Arousal/Alertness: Appropriate responses to stimuli  Following Commands: Follows one step commands with repetition; Follows one step commands with increased time  Attention Span: Attends with cues to redirect  Memory: Decreased recall of recent events;Decreased short term memory;Decreased recall of precautions  Safety Judgement: Decreased awareness of need for assistance  Problem Solving: Assistance required to identify errors made;Assistance required to generate solutions;Assistance required to implement solutions  Insights: Decreased awareness of deficits  Initiation: Requires cues for some  Sequencing: Requires cues for some  Orientation  Overall Orientation Status: Within Functional Limits    Functional Mobility     Assessed vitals in bed while pt was supine. Pt reports soreness and fatigue in L LE. PT assisted pt with dressing and removal of pure wick. Pt was Jake with bed mobility and Mod-MaxA with static sitting at edge of bed. Pt showed an inability to sit at EOB for >10 seconds without LOB posteriorly. Pt required multiple attempts to transfer supine to sitting before success. Pr completed STS from EOB to RW with CGA. Pt was able to maintain standing balance with CGA while PT assisted with dressing LEs MaxA. STS from EOB to RW CGA. Gait x90'+ 50'+90' with RW and CGA.  Pt cued frequently on upright posture and walker management. Pt tends to look toward the ground for visual assist which leads to rounding shoulders and trunk flexion and arms extended with walker out in front. Pt also tends to increase gait speed with small, quick steps in order to compensate for fatigue and instability. PT cues for equal step length with L/R LEs. Pt continues to show reduced step length/height with R LE. STS from wheelchair to [address]    Dyn standing activity: 2x 2 min 30 sec standing balance at table with 1-2 UE assist and CGA. Pt cued to stand at table--> unscramble letters to form words/ sort letters into piles. Completed to increase standing tolerance. STS from wheelchair to [address] on [employer] recumbent bke. With BUE and BLE , maintains rate of ~60 steps/min. Completed to increase cardiorespiratory endurance and LE strength. Self-propel wheelchair x60' to room supv      STS from wheelchair to recliner CGA. Pt left in recliner with call light and other needs within reach. ASSESSMENT/PROGRESS TOWARDS GOALS  Vital Signs  Heart Rate: 71  BP: 139/76  MAP (Calculated): 97  SpO2: 91 %    Assessment  Assessment: pt continues to report soreness and stiffness in the L thigh. pt demonstrates reduced tolerance to weight bearing on L LE and increases in pain with movement and weight bearing. pt continues to show an antalgic gait pattern with reduced step length/height on the R. pt was CGA-Jake with bed mobility and MaxA static sitting today, CGA with transfers and CGA with gait. DC to home with HHPT vs OPPT depending on need and progress. DME: TBD  Activity Tolerance: Patient limited by fatigue;Patient limited by pain; Patient limited by endurance  Discharge Recommendations: 24 hour supervision or assist;Home with Home health PT  PT Equipment Recommendations  Equipment Needed: Yes  Wheelchair: Standard  Other: CTA, pt may require manual w/c with cushion, pending progression of mobility and gait, pt reports he owns RW, 1XM and electric scooter for community    Goals  Patient Goals   Patient goals : \"Get stronger so I can get outside and be able to do things I on my own\"  Short Term Goals  Time Frame for Short term goals: 10 days 6/12/22  Short term goal 1: Pt will complete supine to/from sit with Cristobal  Short term goal 2: Pt will complete sit to/from stand with RW with Cristobal  Short term goal 3: Pt will complete bed <> chair with RW with modA  Short term goal 4: Pt will ambulate x 15' with RW with modA without LOB  Long Term Goals  Time Frame for Long term goals : 21 days 6/23/22  Long term goal 1: Pt will complete supine to/from sit with SBA  Long term goal 2: Pt will complete functional t/f with RW with CGA  Long term goal 3: Pt will ambulate x 48' with RW with CGA without LOB  Long term goal 4: Pt will complete car t/f with RW and CGA    PLAN OF CARE/SAFETY  Plan  Plan: 5-7 times per week  Plan weeks: 3 weeks  Specific Instructions for Next Treatment: Progress mobility as tolerated  Current Treatment Recommendations: Strengthening; Wheelchair mobility training;Equipment evaluation, education, & procurement;Balance training;Gait training;Pain management; Modalities;Stair training;Functional mobility training;Transfer training;Neuromuscular re-education;Home exercise program;Positioning; Safety education & training;Manual Therapy - Soft Tissue Mobilization;Patient/Caregiver education & training; Therapeutic activities; Endurance training;Manual Therapy - Joint Manipulation;Cognitive reorientation  Safety Devices  Type of Devices: All fall risk precautions in place;Call light within reach; Patient at risk for falls;Gait belt;Nurse notified; Left in chair;Chair alarm in place    EDUCATION  Education  Education Given To: Patient  Education Provided: Role of Therapy;Plan of Care;Precautions; Safety; Fall Prevention Strategies;DME/Home Modifications;Transfer Training;Equipment;ADL Function;Cognition;Mobility Training  Education Provided Comments: pt educated on importance of OOB mobility, need for safety with fatigue and buckling, safe use of hand placement and RW - requires reinforcement  Education Method: Demonstration;Verbal  Barriers to Learning: Cognition; Hearing  Education Outcome: Continued education needed;Verbalized understanding; Unable to demonstrate understanding        Therapy Time   Individual Concurrent Group Co-treatment   Time In 0800         Time Out 0900         Minutes 60           Timed Code Treatment Minutes: 61 Minutes       Dash Cuevas, 06/10/22 at 11:50 AM

## 2022-06-11 LAB
GLUCOSE BLD-MCNC: 168 MG/DL (ref 70–99)
GLUCOSE BLD-MCNC: 185 MG/DL (ref 70–99)
GLUCOSE BLD-MCNC: 224 MG/DL (ref 70–99)
GLUCOSE BLD-MCNC: 238 MG/DL (ref 70–99)
PERFORMED ON: ABNORMAL

## 2022-06-11 PROCEDURE — 6370000000 HC RX 637 (ALT 250 FOR IP): Performed by: STUDENT IN AN ORGANIZED HEALTH CARE EDUCATION/TRAINING PROGRAM

## 2022-06-11 PROCEDURE — 1280000000 HC REHAB R&B

## 2022-06-11 PROCEDURE — 6360000002 HC RX W HCPCS: Performed by: STUDENT IN AN ORGANIZED HEALTH CARE EDUCATION/TRAINING PROGRAM

## 2022-06-11 RX ADMIN — ENOXAPARIN SODIUM 40 MG: 100 INJECTION SUBCUTANEOUS at 08:57

## 2022-06-11 RX ADMIN — METHOCARBAMOL 500 MG: 500 TABLET ORAL at 09:01

## 2022-06-11 RX ADMIN — INSULIN LISPRO 2 UNITS: 100 INJECTION, SOLUTION INTRAVENOUS; SUBCUTANEOUS at 20:39

## 2022-06-11 RX ADMIN — INSULIN LISPRO 2 UNITS: 100 INJECTION, SOLUTION INTRAVENOUS; SUBCUTANEOUS at 06:42

## 2022-06-11 RX ADMIN — INSULIN LISPRO 4 UNITS: 100 INJECTION, SOLUTION INTRAVENOUS; SUBCUTANEOUS at 12:18

## 2022-06-11 RX ADMIN — SERTRALINE 100 MG: 50 TABLET, FILM COATED ORAL at 09:00

## 2022-06-11 RX ADMIN — ATORVASTATIN CALCIUM 40 MG: 40 TABLET, FILM COATED ORAL at 20:29

## 2022-06-11 RX ADMIN — PANTOPRAZOLE SODIUM 40 MG: 40 TABLET, DELAYED RELEASE ORAL at 06:42

## 2022-06-11 RX ADMIN — INSULIN LISPRO 2 UNITS: 100 INJECTION, SOLUTION INTRAVENOUS; SUBCUTANEOUS at 16:57

## 2022-06-11 RX ADMIN — METHOCARBAMOL 500 MG: 500 TABLET ORAL at 16:56

## 2022-06-11 RX ADMIN — TROSPIUM CHLORIDE 20 MG: 20 TABLET, FILM COATED ORAL at 06:42

## 2022-06-11 RX ADMIN — TRAZODONE HYDROCHLORIDE 50 MG: 50 TABLET ORAL at 20:30

## 2022-06-11 RX ADMIN — GABAPENTIN 300 MG: 300 CAPSULE ORAL at 20:30

## 2022-06-11 RX ADMIN — CYANOCOBALAMIN TAB 500 MCG 1000 MCG: 500 TAB at 09:00

## 2022-06-11 RX ADMIN — METHOCARBAMOL 500 MG: 500 TABLET ORAL at 12:26

## 2022-06-11 RX ADMIN — AMLODIPINE BESYLATE 10 MG: 5 TABLET ORAL at 08:59

## 2022-06-11 RX ADMIN — GABAPENTIN 300 MG: 300 CAPSULE ORAL at 09:00

## 2022-06-11 RX ADMIN — INSULIN GLARGINE 22 UNITS: 100 INJECTION, SOLUTION SUBCUTANEOUS at 20:39

## 2022-06-11 RX ADMIN — VALSARTAN 80 MG: 80 TABLET, FILM COATED ORAL at 08:59

## 2022-06-11 RX ADMIN — ASPIRIN 81 MG: 81 TABLET, COATED ORAL at 08:59

## 2022-06-11 RX ADMIN — METHOCARBAMOL 500 MG: 500 TABLET ORAL at 20:30

## 2022-06-11 RX ADMIN — TROSPIUM CHLORIDE 20 MG: 20 TABLET, FILM COATED ORAL at 16:56

## 2022-06-11 ASSESSMENT — PAIN SCALES - GENERAL
PAINLEVEL_OUTOF10: 0
PAINLEVEL_OUTOF10: 0

## 2022-06-11 NOTE — PROGRESS NOTES
Heri Nichole  6/11/2022  [id number]    Chief Complaint: S/p left hip fracture    Subjective: Patient seen this AM.  He is feeling better with his left hip pain complaints, but still notices an increase in pain as he goes through therapies. He feels he is making improvement in therapies with his transfers and gait. His blood sugars are under better control. Taking his pain medication helps to keep his left hip pain under control. ROS: denies f/c, n/v, cp, sob    Objective:  Patient Vitals for the past 24 hrs:   BP Temp Temp src Pulse Resp SpO2   06/11/22 0845 105/63 97.7 °F (36.5 °C) Oral 87 20 96 %   06/10/22 2130 (!) 164/74 98.1 °F (36.7 °C) Oral 74 18 93 %   06/10/22 1900 -- -- -- -- 16 --   06/10/22 1030 139/76 -- -- 71 -- 91 %     Gen: No distress, pleasant. Seated in chair  HEENT: Normocephalic, atraumatic. CV: extremities well perfused  Resp: No respiratory distress. On room air  Abd: Soft, nontender   Ext: No edema. Neuro: Alert, oriented, appropriately interactive.  Speech fluent, delayed processing    Wt Readings from Last 3 Encounters:   06/06/22 205 lb (93 kg)       Laboratory data:   Lab Results   Component Value Date    WBC 7.3 06/09/2022    HGB 10.1 (L) 06/09/2022    HCT 31.6 (L) 06/09/2022    MCV 78.4 (L) 06/09/2022     06/09/2022       Lab Results   Component Value Date     06/09/2022    K 4.7 06/09/2022     06/09/2022    CO2 28 06/09/2022    BUN 14 06/09/2022    CREATININE 0.8 06/09/2022    GLUCOSE 237 06/09/2022    CALCIUM 8.4 06/09/2022        Therapy progress:  PT     Objective     Sit to Stand: Minimal Assistance  Stand to sit: Minimal Assistance  Bed to Chair: Dependent/Total  Device: Rolling Walker  Assistance: Contact guard assistance,Moderate assistance  Distance: 47 ft, 18 ft ( several 180* turns)  OT  PT Equipment Recommendations  Equipment Needed: Yes  Mobility Devices: Wheelchair  Wheelchair: Standard  Other: CTA, pt may require manual w/c with cushion, pending progression of mobility and gait, pt reports he owns RW, 3LJ and electric scooter for community  Equipment Used: Standard toilet  Toilet Transfers Comments: use of Salinas Pluck  Assessment        SLP    Pt was seen for cognitive tx this date. Pt seen alert and oriented within chair. Pt completed multiple cognitive tasks: abstract organization, concrete organization, verbal reasoning, executive functioning (time), and recall. Pt complete recall task with 91% acc indep. Pt completed concrete organizational/reasoning task with 100% acc indep. Abstract reasoning task completed with 50% acc indep, improving to 82% acc with min cues. Executive functioning (daily time task) completed with 81% accuracy. Dr came in during visit, pt told dr that he will be in charge of giving own insulin at home after d/c. Pt stated that grandson will be driving him mostly. Pt observed with several regular solids PO trials and TL via cup, tolerating without any difficulty or s/s of aspiration- all dysphagia goals met this date. Continue goals above. Body mass index is 31.17 kg/m². Assessment and Plan:  Chikis Sanchez is a 78year old male with a past medical history significant for DM2, HTN, HLD, BENRARDO, COPD, prostate cancer s/p prostatectomy (2019), and GERD who fell down 2 steps and was found to have left hip and femur fracture on 5/6/22 s/p surgery. He was admitted to [facility] on 6/3/22 due to functional deficits below his baseline.     Left Hip fracture  - s/p repair in [place]  - WBAT  - PT and OT     Post-operative Pain  - tylenol PRN, oxycodone to 2.5 mg PRN  - gabapentin 300 mg BID  - robaxin scheduled    DM2  - increased Lantus to 22 units nightly  - increased SSI to medium dose correction  - has metformin listed as allergy  - reports he was only taking Lantus at home.  Do not plan to discharge on SSI due to concerns about ability to accurately administer   - family will need training on insulin administration prior to discharge     COPD  - adequately oxygenating on room   - wean oxygen for SpO2>90%     BERNARDO  - CPAP qhs  - respiratory consulted    History of HTN  - home regimen: amlodipine 10 mg, losartan 100 mg  - continue amlodipine  - decreased valsartan to 80 mg daily     HLD  - statin     History of Prostate Cancer  - s/p prostatectomy in 2019  - was recently told that his cancer is back and tentative plan to resume chemo  - oxybutynin discontinued due to concerns this was contributing to confusion  - follow up outpatient     Obesity  - BMI 41   - encourage lifestyle modifications     Bowels: Schedule stool softener. Follow bowel movements. Enema or suppository if needed.      Bladder: Check PVR x 3. [address] if PVR > 200ml or if any volume is > 500 ml.      Sleep: Trazodone provided prn.      Follow up appointments: PCP    Services: home health PT, OT, ST, nursing; 24/7 supervision  EDOD: 6/18/22     Orlando Galvin MD 6/11/2022, 9:57 AM

## 2022-06-12 LAB
GLUCOSE BLD-MCNC: 154 MG/DL (ref 70–99)
GLUCOSE BLD-MCNC: 221 MG/DL (ref 70–99)
GLUCOSE BLD-MCNC: 222 MG/DL (ref 70–99)
GLUCOSE BLD-MCNC: 222 MG/DL (ref 70–99)
PERFORMED ON: ABNORMAL

## 2022-06-12 PROCEDURE — 6370000000 HC RX 637 (ALT 250 FOR IP): Performed by: STUDENT IN AN ORGANIZED HEALTH CARE EDUCATION/TRAINING PROGRAM

## 2022-06-12 PROCEDURE — 2500000003 HC RX 250 WO HCPCS: Performed by: PHYSICAL MEDICINE & REHABILITATION

## 2022-06-12 PROCEDURE — 1280000000 HC REHAB R&B

## 2022-06-12 PROCEDURE — 6360000002 HC RX W HCPCS: Performed by: STUDENT IN AN ORGANIZED HEALTH CARE EDUCATION/TRAINING PROGRAM

## 2022-06-12 RX ADMIN — CYANOCOBALAMIN TAB 500 MCG 1000 MCG: 500 TAB at 09:53

## 2022-06-12 RX ADMIN — METHOCARBAMOL 500 MG: 500 TABLET ORAL at 17:11

## 2022-06-12 RX ADMIN — INSULIN LISPRO 2 UNITS: 100 INJECTION, SOLUTION INTRAVENOUS; SUBCUTANEOUS at 21:02

## 2022-06-12 RX ADMIN — SERTRALINE 100 MG: 50 TABLET, FILM COATED ORAL at 09:54

## 2022-06-12 RX ADMIN — POLYETHYLENE GLYCOL 3350 17 G: 17 POWDER, FOR SOLUTION ORAL at 09:57

## 2022-06-12 RX ADMIN — METHOCARBAMOL 500 MG: 500 TABLET ORAL at 21:01

## 2022-06-12 RX ADMIN — TRAZODONE HYDROCHLORIDE 50 MG: 50 TABLET ORAL at 21:01

## 2022-06-12 RX ADMIN — INSULIN GLARGINE 22 UNITS: 100 INJECTION, SOLUTION SUBCUTANEOUS at 21:01

## 2022-06-12 RX ADMIN — GABAPENTIN 300 MG: 300 CAPSULE ORAL at 09:56

## 2022-06-12 RX ADMIN — DOCUSATE SODIUM 50 MG AND SENNOSIDES 8.6 MG 2 TABLET: 8.6; 5 TABLET, FILM COATED ORAL at 09:54

## 2022-06-12 RX ADMIN — MICONAZOLE NITRATE: 2 POWDER TOPICAL at 09:56

## 2022-06-12 RX ADMIN — INSULIN LISPRO 4 UNITS: 100 INJECTION, SOLUTION INTRAVENOUS; SUBCUTANEOUS at 17:12

## 2022-06-12 RX ADMIN — ACETAMINOPHEN 325MG 650 MG: 325 TABLET ORAL at 14:20

## 2022-06-12 RX ADMIN — TROSPIUM CHLORIDE 20 MG: 20 TABLET, FILM COATED ORAL at 06:27

## 2022-06-12 RX ADMIN — INSULIN LISPRO 2 UNITS: 100 INJECTION, SOLUTION INTRAVENOUS; SUBCUTANEOUS at 06:27

## 2022-06-12 RX ADMIN — METHOCARBAMOL 500 MG: 500 TABLET ORAL at 11:33

## 2022-06-12 RX ADMIN — MICONAZOLE NITRATE: 2 POWDER TOPICAL at 21:01

## 2022-06-12 RX ADMIN — ATORVASTATIN CALCIUM 40 MG: 40 TABLET, FILM COATED ORAL at 21:01

## 2022-06-12 RX ADMIN — PANTOPRAZOLE SODIUM 40 MG: 40 TABLET, DELAYED RELEASE ORAL at 06:27

## 2022-06-12 RX ADMIN — ASPIRIN 81 MG: 81 TABLET, COATED ORAL at 09:53

## 2022-06-12 RX ADMIN — AMLODIPINE BESYLATE 10 MG: 5 TABLET ORAL at 09:55

## 2022-06-12 RX ADMIN — INSULIN LISPRO 4 UNITS: 100 INJECTION, SOLUTION INTRAVENOUS; SUBCUTANEOUS at 11:36

## 2022-06-12 RX ADMIN — TROSPIUM CHLORIDE 20 MG: 20 TABLET, FILM COATED ORAL at 17:11

## 2022-06-12 RX ADMIN — ENOXAPARIN SODIUM 40 MG: 100 INJECTION SUBCUTANEOUS at 09:56

## 2022-06-12 RX ADMIN — VALSARTAN 80 MG: 80 TABLET, FILM COATED ORAL at 09:54

## 2022-06-12 RX ADMIN — GABAPENTIN 300 MG: 300 CAPSULE ORAL at 21:01

## 2022-06-12 ASSESSMENT — PAIN SCALES - GENERAL
PAINLEVEL_OUTOF10: 0
PAINLEVEL_OUTOF10: 0
PAINLEVEL_OUTOF10: 5
PAINLEVEL_OUTOF10: 4
PAINLEVEL_OUTOF10: 0

## 2022-06-12 ASSESSMENT — PAIN DESCRIPTION - ORIENTATION: ORIENTATION: LEFT

## 2022-06-12 ASSESSMENT — PAIN DESCRIPTION - LOCATION: LOCATION: HIP

## 2022-06-13 LAB
ANION GAP SERPL CALCULATED.3IONS-SCNC: 7 MMOL/L (ref 3–16)
BASOPHILS ABSOLUTE: 0 K/UL (ref 0–0.2)
BASOPHILS RELATIVE PERCENT: 0.6 %
BUN BLDV-MCNC: 21 MG/DL (ref 7–20)
CALCIUM SERPL-MCNC: 8.4 MG/DL (ref 8.3–10.6)
CHLORIDE BLD-SCNC: 103 MMOL/L (ref 99–110)
CO2: 28 MMOL/L (ref 21–32)
CREAT SERPL-MCNC: 0.9 MG/DL (ref 0.8–1.3)
EOSINOPHILS ABSOLUTE: 0.3 K/UL (ref 0–0.6)
EOSINOPHILS RELATIVE PERCENT: 6.3 %
GFR AFRICAN AMERICAN: >60
GFR NON-AFRICAN AMERICAN: >60
GLUCOSE BLD-MCNC: 147 MG/DL (ref 70–99)
GLUCOSE BLD-MCNC: 201 MG/DL (ref 70–99)
GLUCOSE BLD-MCNC: 206 MG/DL (ref 70–99)
GLUCOSE BLD-MCNC: 212 MG/DL (ref 70–99)
GLUCOSE BLD-MCNC: 278 MG/DL (ref 70–99)
HCT VFR BLD CALC: 32 % (ref 40.5–52.5)
HEMOGLOBIN: 10.3 G/DL (ref 13.5–17.5)
LYMPHOCYTES ABSOLUTE: 1.7 K/UL (ref 1–5.1)
LYMPHOCYTES RELATIVE PERCENT: 38.8 %
MCH RBC QN AUTO: 25.5 PG (ref 26–34)
MCHC RBC AUTO-ENTMCNC: 32.1 G/DL (ref 31–36)
MCV RBC AUTO: 79.4 FL (ref 80–100)
MONOCYTES ABSOLUTE: 0.5 K/UL (ref 0–1.3)
MONOCYTES RELATIVE PERCENT: 10.5 %
NEUTROPHILS ABSOLUTE: 1.9 K/UL (ref 1.7–7.7)
NEUTROPHILS RELATIVE PERCENT: 43.8 %
PDW BLD-RTO: 16.9 % (ref 12.4–15.4)
PERFORMED ON: ABNORMAL
PLATELET # BLD: 176 K/UL (ref 135–450)
PMV BLD AUTO: 8.1 FL (ref 5–10.5)
POTASSIUM REFLEX MAGNESIUM: 4.5 MMOL/L (ref 3.5–5.1)
RBC # BLD: 4.03 M/UL (ref 4.2–5.9)
SODIUM BLD-SCNC: 138 MMOL/L (ref 136–145)
WBC # BLD: 4.4 K/UL (ref 4–11)

## 2022-06-13 PROCEDURE — 6370000000 HC RX 637 (ALT 250 FOR IP): Performed by: STUDENT IN AN ORGANIZED HEALTH CARE EDUCATION/TRAINING PROGRAM

## 2022-06-13 PROCEDURE — 36415 COLL VENOUS BLD VENIPUNCTURE: CPT

## 2022-06-13 PROCEDURE — 97110 THERAPEUTIC EXERCISES: CPT

## 2022-06-13 PROCEDURE — 97116 GAIT TRAINING THERAPY: CPT

## 2022-06-13 PROCEDURE — 6360000002 HC RX W HCPCS: Performed by: STUDENT IN AN ORGANIZED HEALTH CARE EDUCATION/TRAINING PROGRAM

## 2022-06-13 PROCEDURE — 97535 SELF CARE MNGMENT TRAINING: CPT

## 2022-06-13 PROCEDURE — 80048 BASIC METABOLIC PNL TOTAL CA: CPT

## 2022-06-13 PROCEDURE — 1280000000 HC REHAB R&B

## 2022-06-13 PROCEDURE — 85025 COMPLETE CBC W/AUTO DIFF WBC: CPT

## 2022-06-13 PROCEDURE — 97530 THERAPEUTIC ACTIVITIES: CPT

## 2022-06-13 PROCEDURE — 97130 THER IVNTJ EA ADDL 15 MIN: CPT

## 2022-06-13 PROCEDURE — 97129 THER IVNTJ 1ST 15 MIN: CPT

## 2022-06-13 RX ADMIN — MICONAZOLE NITRATE: 2 POWDER TOPICAL at 20:52

## 2022-06-13 RX ADMIN — METHOCARBAMOL 500 MG: 500 TABLET ORAL at 17:04

## 2022-06-13 RX ADMIN — METHOCARBAMOL 500 MG: 500 TABLET ORAL at 13:28

## 2022-06-13 RX ADMIN — ATORVASTATIN CALCIUM 40 MG: 40 TABLET, FILM COATED ORAL at 20:51

## 2022-06-13 RX ADMIN — INSULIN LISPRO 4 UNITS: 100 INJECTION, SOLUTION INTRAVENOUS; SUBCUTANEOUS at 06:22

## 2022-06-13 RX ADMIN — ASPIRIN 81 MG: 81 TABLET, COATED ORAL at 09:21

## 2022-06-13 RX ADMIN — GABAPENTIN 300 MG: 300 CAPSULE ORAL at 20:51

## 2022-06-13 RX ADMIN — TROSPIUM CHLORIDE 20 MG: 20 TABLET, FILM COATED ORAL at 16:05

## 2022-06-13 RX ADMIN — MICONAZOLE NITRATE: 2 POWDER TOPICAL at 09:21

## 2022-06-13 RX ADMIN — TROSPIUM CHLORIDE 20 MG: 20 TABLET, FILM COATED ORAL at 06:23

## 2022-06-13 RX ADMIN — PANTOPRAZOLE SODIUM 40 MG: 40 TABLET, DELAYED RELEASE ORAL at 06:23

## 2022-06-13 RX ADMIN — GABAPENTIN 300 MG: 300 CAPSULE ORAL at 09:21

## 2022-06-13 RX ADMIN — INSULIN LISPRO 2 UNITS: 100 INJECTION, SOLUTION INTRAVENOUS; SUBCUTANEOUS at 11:37

## 2022-06-13 RX ADMIN — INSULIN GLARGINE 22 UNITS: 100 INJECTION, SOLUTION SUBCUTANEOUS at 20:53

## 2022-06-13 RX ADMIN — METHOCARBAMOL 500 MG: 500 TABLET ORAL at 09:20

## 2022-06-13 RX ADMIN — INSULIN LISPRO 6 UNITS: 100 INJECTION, SOLUTION INTRAVENOUS; SUBCUTANEOUS at 16:24

## 2022-06-13 RX ADMIN — TRAZODONE HYDROCHLORIDE 50 MG: 50 TABLET ORAL at 20:51

## 2022-06-13 RX ADMIN — INSULIN LISPRO 2 UNITS: 100 INJECTION, SOLUTION INTRAVENOUS; SUBCUTANEOUS at 20:53

## 2022-06-13 RX ADMIN — ACETAMINOPHEN 325MG 650 MG: 325 TABLET ORAL at 18:28

## 2022-06-13 RX ADMIN — METHOCARBAMOL 500 MG: 500 TABLET ORAL at 20:51

## 2022-06-13 RX ADMIN — SERTRALINE 100 MG: 50 TABLET, FILM COATED ORAL at 09:21

## 2022-06-13 RX ADMIN — ENOXAPARIN SODIUM 40 MG: 100 INJECTION SUBCUTANEOUS at 09:21

## 2022-06-13 RX ADMIN — VALSARTAN 80 MG: 80 TABLET, FILM COATED ORAL at 09:21

## 2022-06-13 RX ADMIN — OXYCODONE 2.5 MG: 5 TABLET ORAL at 17:37

## 2022-06-13 RX ADMIN — CYANOCOBALAMIN TAB 500 MCG 1000 MCG: 500 TAB at 09:21

## 2022-06-13 RX ADMIN — AMLODIPINE BESYLATE 10 MG: 5 TABLET ORAL at 09:21

## 2022-06-13 RX ADMIN — DOCUSATE SODIUM 50 MG AND SENNOSIDES 8.6 MG 2 TABLET: 8.6; 5 TABLET, FILM COATED ORAL at 09:21

## 2022-06-13 ASSESSMENT — PAIN DESCRIPTION - DESCRIPTORS
DESCRIPTORS: ACHING

## 2022-06-13 ASSESSMENT — PAIN SCALES - GENERAL
PAINLEVEL_OUTOF10: 5
PAINLEVEL_OUTOF10: 6
PAINLEVEL_OUTOF10: 3
PAINLEVEL_OUTOF10: 4

## 2022-06-13 ASSESSMENT — PAIN DESCRIPTION - LOCATION
LOCATION: HIP
LOCATION: HIP;LEG
LOCATION: HIP
LOCATION: HIP;LEG

## 2022-06-13 ASSESSMENT — PAIN DESCRIPTION - ORIENTATION
ORIENTATION: LEFT

## 2022-06-13 ASSESSMENT — PAIN - FUNCTIONAL ASSESSMENT
PAIN_FUNCTIONAL_ASSESSMENT: ACTIVITIES ARE NOT PREVENTED

## 2022-06-13 NOTE — PROGRESS NOTES
Occupational Therapy  Facility/Department: [facility]  Daily Treatment Note  NAME: Doron New  : 1945  MRN: [id number]    Date of Service: 2022    Discharge Recommendations:  24 hour supervision or assist,Home with Home health OT,S Level 1  OT Equipment Recommendations  Equipment Needed: Yes  Mobility Devices: ADL Assistive Devices  ADL Assistive Devices: Toileting - 3-in-1 Commode      Patient Diagnosis(es): There were no encounter diagnoses. Assessment    Assessment: Pt demo good balance this day, CGA initally and improving to SBA. Pt groomed seated at sink with good skill and req no A or cues. Pt completed TB dressing with Min A for LB and cues for use of AD. Pt gathered clothes this day with no A using w/c in room. Pt demo good tolerance of BUE therex with Min VCs for redirection. Cont per POC    Second Session: Pt reporting fatigue from previous sessions but states that balance continues to be main concern. Focus of session on dynamic seated and standing balance with core strengthenging and balance righting. Pt completed exercises seated at mat reaching outside STEVEN in all directions with LOB posteriorly, max fatigue and poor core strength. Pt completed in standing with LOB posteriorly Pt continues to benefit from skilled OT during stay. Continue POC. Activity Tolerance: Patient limited by fatigue;Patient limited by pain; Patient limited by endurance  Discharge Recommendations: 24 hour supervision or assist;Home with Home health OT;S Level 1  Equipment Needed: Yes  Mobility Devices: ADL Assistive Devices      Plan   Plan  Times per Week: 5/7 days/week  Plan Weeks: 3 weeks  Specific Instructions for Next Treatment: dynamic UE with static standing, dynamic seated and standing balance, getting up from floor  Current Treatment Recommendations: Strengthening;ROM;Balance training;Functional mobility training; Wheelchair mobility training; Safety education & training;Neuromuscular re-education;Cognitive reorientation; Endurance training;Pain management;Self-Care / ADL; Home management training;Cognitive/Perceptual training;Coordination training;Equipment evaluation, education, & procurement;Patient/Caregiver education & training     Restrictions  Restrictions/Precautions  Restrictions/Precautions: Weight Bearing; Fall Risk;General Precautions  Lower Extremity Weight Bearing Restrictions  Right Lower Extremity Weight Bearing: Weight Bearing As Tolerated  Left Lower Extremity Weight Bearing: Weight Bearing As Tolerated    Subjective   Subjective  Subjective: Pt seated EOB on arrival, pleasant and agreeable to tx. Pt requesting to get cleaned up  Orientation  Overall Orientation Status: Within Functional Limits  Orientation Level: Oriented to person;Oriented to situation;Oriented to time;Oriented to place  Cognition  Overall Cognitive Status: Exceptions  Arousal/Alertness: Appropriate responses to stimuli  Following Commands: Follows one step commands with repetition; Follows one step commands with increased time  Attention Span: Attends with cues to redirect  Memory: Decreased recall of recent events;Decreased short term memory;Decreased recall of precautions  Safety Judgement: Decreased awareness of need for assistance  Problem Solving: Assistance required to identify errors made;Assistance required to generate solutions;Assistance required to implement solutions  Insights: Decreased awareness of deficits  Initiation: Requires cues for some  Sequencing: Requires cues for some        Objective    Vitals  Vitals  Heart Rate: 77  Heart Rate Source: Monitor  BP: 120/68  BP Location: Left upper arm  MAP (Calculated): 85.33  SpO2: 93 %  O2 Device: None (Room air)      First Session:         6/13/22     4031     Equipment Provided --   Assistance Level Independent   Skilled Clinical Factors --   Assistance Level Modified independent   Skilled Clinical Factors seated at sink to comb hair and brush teeth       6/13/22     6210 Feeding     Equipment Provided --   Assistance Level Independent   Skilled Clinical Factors --   Grooming/Oral Hygiene     Assistance Level Modified independent   Skilled Clinical Factors seated at sink to comb hair, shave face and brush teeth         OT Exercises  Resistive Exercises: 4# weight x20 for wrist flexion, wrist extension, supination/pronation, shoulder flexion, shoulder horizontal abduction, chest press    Second Session:  Dynamic Sitting Balance Exercises: to address core strength and balance righting seated reaching to lean outside STEVEN x10 in left, right, forward and backward direction with focus on transverse abd keeping BLE stablized on ground with max diffiuclty, 5 LOB posteriorly. Dynamic Standing Balance Exercises: standing with 2# weighted ball throwing with B hands at chest height to catch rebounding ball off surface 16x, 4 posterior LOB with therapist assisting in controlled lowering to chair. Bed mobility: increased time, SPV  Mobility:   Functional Mobility: CGA with RW  Sit<>stands CGA fading to min a with fatigue     Safety Devices  Type of Devices: All fall risk precautions in place;Call light within reach; Patient at risk for falls;Gait belt;Nurse notified; Left in chair;Chair alarm in place  Restraints  Restraints Initially in Place: No        Goals  Short Term Goals  Time Frame for Short term goals: 1 week- 6/10/22  Short Term Goal 1: Pt will perform functional transfers with max A. GOAL MET 6/9/22 Pt performed functional transfers with min A. Short Term Goal 2: Pt will complete toileting with max A. GOAL MET 6/10/22 Pt completed toileting with max A with assist of nursing per patient report. Short Term Goal 3: Pt will perform LB dressing with mod A. GOAL MET 6/9/22 Pt performed LB dressing with min A. Short Term Goal 4: Pt will complete bathing with mod A. GOAL MET 6/9/22 Pt completed bathing with CGA.   Long Term Goals  Time Frame for Long term goals : 3 weeks- 6/23/22  Long Term Goal 1: Pt will complete functional transfers with CGA. -- GOAL MET 6/13  Long Term Goal 2: Pt will perform bathing with CGA. Long Term Goal 3: Pt will complete full body dressing with CGA. Long Term Goal 4: Pt will perform toileting with CGA. Long Term Goal 5: Pt will perform grooming at sink with mod I.-- GOAL MET 6/13  Patient Goals   Patient goals :  \"Get independent and go home\"       Therapy Time   Individual Concurrent Group Co-treatment   Time In 1000         Time Out 1100         Minutes 60         Timed Code Treatment Minutes: 60 Minutes    Second Session Therapy Time:   Individual Concurrent Group Co-treatment   Time In 1218        Time Out 1515        Minutes 30          Timed Code Treatment Minutes:  30 Minutes    Total Treatment Minutes:  90 minutes      Transave, OT   KRISTOPHER MccordR/L

## 2022-06-13 NOTE — PROGRESS NOTES
Chikis Sanchez  6/13/2022  [id number]    Chief Complaint: S/p left hip fracture    Subjective: Patient seen this AM.  He is feeling better with his left hip pain complaints, but still notices an increase in stiffness over the weekend with less movement of his hip. He did not have any problems over the weekend. He feels he is making improvement in therapies with his transfers and gait. His blood sugars are under better control. Repeat labs this morning look good and are stable. ROS: denies f/c, n/v, cp, sob    Objective:  Patient Vitals for the past 24 hrs:   BP Temp Temp src Pulse Resp SpO2   06/12/22 2045 (!) 126/55 97.9 °F (36.6 °C) Oral 69 18 93 %   06/12/22 0930 (!) 141/71 97.7 °F (36.5 °C) Oral 71 16 93 %     Gen: No distress, pleasant. Seated in chair  HEENT: Normocephalic, atraumatic. CV: extremities well perfused  Resp: No respiratory distress. On room air  Abd: Soft, nontender   Ext: No edema. Neuro: Alert, oriented, appropriately interactive.  Speech fluent, delayed processing    Wt Readings from Last 3 Encounters:   06/12/22 207 lb 14.4 oz (94.3 kg)       Laboratory data:   Lab Results   Component Value Date    WBC 4.4 06/13/2022    HGB 10.3 (L) 06/13/2022    HCT 32.0 (L) 06/13/2022    MCV 79.4 (L) 06/13/2022     06/13/2022       Lab Results   Component Value Date     06/13/2022    K 4.5 06/13/2022     06/13/2022    CO2 28 06/13/2022    BUN 21 06/13/2022    CREATININE 0.9 06/13/2022    GLUCOSE 206 06/13/2022    CALCIUM 8.4 06/13/2022        Therapy progress:  PT     Objective     Sit to Stand: Minimal Assistance  Stand to sit: Minimal Assistance  Bed to Chair: Dependent/Total  Device: Rolling Walker  Assistance: Contact guard assistance,Moderate assistance  Distance: 47 ft, 18 ft ( several 180* turns)  OT  PT Equipment Recommendations  Equipment Needed: Yes  Mobility Devices: Wheelchair  Wheelchair: Standard  Other: CTA, pt may require manual w/c with cushion, pending progression of mobility and gait, pt reports he owns RW, 2ON and electric scooter for community  Equipment Used: Standard toilet  Toilet Transfers Comments: use of Yuly Mauro  Assessment        SLP    Pt was seen for cognitive tx this date. Pt seen alert and oriented within chair. Pt completed multiple cognitive tasks: abstract organization, concrete organization, verbal reasoning, executive functioning (time), and recall. Pt complete recall task with 91% acc indep. Pt completed concrete organizational/reasoning task with 100% acc indep. Abstract reasoning task completed with 50% acc indep, improving to 82% acc with min cues. Executive functioning (daily time task) completed with 81% accuracy. Dr came in during visit, pt told dr that he will be in charge of giving own insulin at home after d/c. Pt stated that grandson will be driving him mostly. Pt observed with several regular solids PO trials and TL via cup, tolerating without any difficulty or s/s of aspiration- all dysphagia goals met this date. Continue goals above. Body mass index is 31.61 kg/m². Assessment and Plan:  Don Velez is a 78year old male with a past medical history significant for DM2, HTN, HLD, BERNARDO, COPD, prostate cancer s/p prostatectomy (2019), and GERD who fell down 2 steps and was found to have left hip and femur fracture on 5/6/22 s/p surgery. He was admitted to [facility] on 6/3/22 due to functional deficits below his baseline.     Left Hip fracture  - s/p repair in [place]  - WBAT  - PT and OT     Post-operative Pain  - tylenol PRN, oxycodone to 2.5 mg PRN  - gabapentin 300 mg BID  - robaxin scheduled    DM2  - increased Lantus to 22 units nightly  - increased SSI to medium dose correction  - has metformin listed as allergy  - reports he was only taking Lantus at home.  Do not plan to discharge on SSI due to concerns about ability to accurately administer   - family will need training on insulin administration prior to discharge     COPD  - adequately oxygenating on room   - wean oxygen for SpO2>90%     BERNARDO  - CPAP qhs  - respiratory consulted    History of HTN  - home regimen: amlodipine 10 mg, losartan 100 mg  - continue amlodipine  - decreased valsartan to 80 mg daily     HLD  - statin     History of Prostate Cancer  - s/p prostatectomy in 2019  - was recently told that his cancer is back and tentative plan to resume chemo  - oxybutynin discontinued due to concerns this was contributing to confusion  - follow up outpatient     Obesity  - BMI 41   - encourage lifestyle modifications     Bowels: Schedule stool softener. Follow bowel movements. Enema or suppository if needed.      Bladder: Check PVR x 3. [facility] if PVR > 200ml or if any volume is > 500 ml.      Sleep: Trazodone provided prn.      Follow up appointments: PCP    Services: home health PT, OT, ST, nursing; 24/7 supervision  EDOD: 6/18/22     Aleyda Galvin MD 6/13/2022, 7:34 AM

## 2022-06-13 NOTE — RT PROTOCOL NOTE
RT Inhaler-Nebulizer Bronchodilator Protocol Note    There is a bronchodilator order in the chart from a provider indicating to follow the RT Bronchodilator Protocol and there is an Initiate RT Inhaler-Nebulizer Bronchodilator Protocol order as well (see protocol at bottom of note). CXR Findings:  No results found. The findings from the last RT Protocol Assessment were as follows:   History Pulmonary Disease: None or smoker <15 pack years  Respiratory Pattern: Regular pattern and RR 12-20 bpm  Breath Sounds: Clear breath sounds  Cough: Strong, spontaneous, non-productive  Indication for Bronchodilator Therapy:    Bronchodilator Assessment Score: 0    Aerosolized bronchodilator medication orders have been revised according to the RT Inhaler-Nebulizer Bronchodilator Protocol below. Respiratory Therapist to perform RT Therapy Protocol Assessment initially then follow the protocol. Repeat RT Therapy Protocol Assessment PRN for score 0-3 or on second treatment, BID, and PRN for scores above 3. No Indications - adjust the frequency to every 6 hours PRN wheezing or bronchospasm, if no treatments needed after 48 hours then discontinue using Per Protocol order mode. If indication present, adjust the RT bronchodilator orders based on the Bronchodilator Assessment Score as indicated below. Use Inhaler orders unless patient has one or more of the following: on home nebulizer, not able to hold breath for 10 seconds, is not alert and oriented, cannot activate and use MDI correctly, or respiratory rate 25 breaths per minute or more, then use the equivalent nebulizer order(s) with same Frequency and PRN reasons based on the score. If a patient is on this medication at home then do not decrease Frequency below that used at home.     0-3 - enter or revise RT bronchodilator order(s) to equivalent RT Bronchodilator order with Frequency of every 4 hours PRN for wheezing or increased work of breathing using Per Protocol order mode. 4-6 - enter or revise RT Bronchodilator order(s) to two equivalent RT bronchodilator orders with one order with BID Frequency and one order with Frequency of every 4 hours PRN wheezing or increased work of breathing using Per Protocol order mode. 7-10 - enter or revise RT Bronchodilator order(s) to two equivalent RT bronchodilator orders with one order with TID Frequency and one order with Frequency of every 4 hours PRN wheezing or increased work of breathing using Per Protocol order mode. 11-13 - enter or revise RT Bronchodilator order(s) to one equivalent RT bronchodilator order with QID Frequency and an Albuterol order with Frequency of every 4 hours PRN wheezing or increased work of breathing using Per Protocol order mode. Greater than 13 - enter or revise RT Bronchodilator order(s) to one equivalent RT bronchodilator order with every 4 hours Frequency and an Albuterol order with Frequency of every 2 hours PRN wheezing or increased work of breathing using Per Protocol order mode. RT to enter RT Home Evaluation for COPD & MDI Assessment order using Per Protocol order mode.     Electronically signed by Luz Maria Draper RCP on 6/13/2022 at 12:16 PM

## 2022-06-13 NOTE — PLAN OF CARE
Problem: Pain  Goal: Verbalizes/displays adequate comfort level or baseline comfort level  Outcome: Progressing  Note: Pt a/o, rates pain appropriately using 0-10 pain scale; pt calls out as needed for pain intervention; will continue to monitor and administer intervention as ordered and requested.      Problem: Skin/Tissue Integrity  Goal: Absence of new skin breakdown    Problem: Safety - Adult  Goal: Free from fall injury  6/13/2022 1242 by Chito Montana RN  Outcome: Progressing

## 2022-06-13 NOTE — DISCHARGE INSTR - COC
Continuity of Care Form    Patient Name: Susannah Vega   :  1945  MRN:  [id number]    Admit date:  6/3/2022  Discharge date:  2022    Code Status Order: Full Code   Advance Directives:      Admitting Physician:  Steffi Bateman MD  PCP: Rafia Ibarra MD    Discharging Nurse:   [address] Unit/Room#: 8488/0786-14  Discharging Unit Phone Number: [phone]    Emergency Contact:   Extended Emergency Contact Information  Primary Emergency Contact: Mari Caruso  Address: [address] Phone: [phone]  Relation: Spouse  Secondary Emergency Contact: [address] [facility] Phone: [phone]  Relation: Child    Past Surgical History:  No past surgical history on file. Immunization History: There is no immunization history on file for this patient. Active Problems:  Patient Active Problem List   Diagnosis Code    S/p left hip fracture Z87.81       Isolation/Infection:   Isolation            No Isolation          Patient Infection Status       None to display            Nurse Assessment:  Last Vital Signs: /64   Pulse 82   Temp 98 °F (36.7 °C) (Oral)   Resp 20   Ht 5' 8\" (1.727 m)   Wt 207 lb 14.4 oz (94.3 kg)   SpO2 93%   BMI 31.61 kg/m²     Last documented pain score (0-10 scale): Pain Level: 4  Last Weight:   Wt Readings from Last 1 Encounters:   22 207 lb 14.4 oz (94.3 kg)     Mental Status:  oriented, alert, coherent, and thought processes intact    IV Access:  - None    Nursing Mobility/ADLs:  Walking   Assisted  Transfer  Assisted  Bathing  Assisted  Dressing  Assisted  Toileting  Assisted  Feeding  Independent  Med Admin  Assisted  Med Delivery   whole    Wound Care Documentation and Therapy:  Wound 22 Hip Left incision (Active)   Wound Etiology Surgical 22   Dressing Status Clean;Dry; Intact 06/10/22 1420   Wound Cleansed Not Cleansed 22   Dressing/Treatment Worker signature: Electronically signed by Jerrica Montenegro RN on 6/17/22 at 10:05 AM EDT    PHYSICIAN SECTION    Prognosis: Good    Condition at Discharge: Stable    Rehab Potential (if transferring to Rehab): Good    Recommended Labs or Other Treatments After Discharge:   - Follow up with Ortho and PCP    Physician Certification: I certify the above information and transfer of Pepe Dong  is necessary for the continuing treatment of the diagnosis listed and that he requires [place] [facility] for less 30 days.      Update Admission H&P: No change in H&P    PHYSICIAN SIGNATURE:  Electronically signed by Breana Bundy MD on 6/17/22 at 1:41 PM EDT

## 2022-06-13 NOTE — PROGRESS NOTES
06/13/22 1216   RT Protocol   History Pulmonary Disease 0   Respiratory pattern 0   Breath sounds 0   Cough 0   Bronchodilator Assessment Score 0

## 2022-06-13 NOTE — PROGRESS NOTES
Physical Therapy  Facility/Department: [facility]  Rehabilitation Physical Therapy Treatment Note    NAME: Gisselle Sanchez  : 1945 (78 y.o.)  MRN: [id number]  CODE STATUS: Full Code    Date of Service: 22       Restrictions:  Restrictions/Precautions: Weight Bearing; Fall Risk;General Precautions  Lower Extremity Weight Bearing Restrictions  Right Lower Extremity Weight Bearing: Weight Bearing As Tolerated  Left Lower Extremity Weight Bearing: Weight Bearing As Tolerated     SUBJECTIVE  Subjective  Subjective: pt found in recliner. Pain: 4/10 LLE pain         OBJECTIVE  Cognition  Overall Cognitive Status: Exceptions  Arousal/Alertness: Appropriate responses to stimuli  Following Commands: Follows one step commands with repetition; Follows one step commands with increased time  Attention Span: Attends with cues to redirect  Memory: Decreased recall of recent events;Decreased short term memory;Decreased recall of precautions  Safety Judgement: Decreased awareness of need for assistance  Problem Solving: Assistance required to identify errors made;Assistance required to generate solutions;Assistance required to implement solutions  Insights: Decreased awareness of deficits  Initiation: Requires cues for some  Sequencing: Requires cues for some  Orientation  Overall Orientation Status: Within Functional Limits  Orientation Level: Oriented to person;Oriented to situation;Oriented to time;Oriented to place    Functional Mobility     Pt found in recliner. Vitals assessed. Pt reports 4/10 pain/discomfort in L hip/thigh. STS from recliner to [address] [place] [address] with CGA. Gait 110' with RW and CGA. Pt cued to increase step length bilaterally in order to equal time in stance phase. Pt continues to have a shortened step on R d/t reduced weight bearing tolerance on the L. Pt shows improved posture and ability to maintain upright positioning.  As pt fatigues he begins to round at the shoulders and extend his arms while taking quicker steps. Ascend/descend 4 steps with step to pattern using BHR and CGA. Pt shows improved tolerance and capability with completing stair training today. Pt is able to maintain upright posture and shows improved weight shift forward. No evidence of instability or knee buckling today. Cues for safe sequencing 75% of time    Dyn. Stepping activity: start shoulder width STEVEN-> RLe step onto 1st 6\" step-> LLE reciprocal step onto 2nd 6\" step->back to start position. Completed with BUE support x 8 reps for improved step length/height and strength of LLE. Pt completed 6 LLE step ups onto 6\" step with BUE support and CGA for improved LEL strength /tolerance to [employer]. Dyn. Stepping activity: 2x10 step over cone with fwd weight shift over the weight bearimg foot. Pt cued to flex hip and knee in order to pick foot over cone rather than around it. Pt demonstrates improved sagittal plane motion with cuing and repetition. Stand pivot transfer with 90* turn to another chair using RW. Pt is CGA with all transfers but does require 2-3 attempts with transfers as he fatigues. Completed 8 total transfers    Seated there. Ex: ith 2# ankle weight- x20 marches, knee extensions, hip abduction, heel/toe raises    STS from WC to RW CGA. Gait x90' back to room with RW, CGA. Pt continues to demonstrate antalgic gait and begins to show greater rounding of shoulders and shorter stride length as he fatigues. Pt reports L LE pain at 5/10. Pt left with nurse while sitting EOB for medication administration. Pt left with call alarm and needs within reach. ASSESSMENT/PROGRESS TOWARDS GOALS  Vital Signs  Heart Rate: 78  Heart Rate Source: Monitor  BP: 115/71  BP Location: Left upper arm  MAP (Calculated): 85.67  SpO2: 91 %  O2 Device: None (Room air)    Assessment  Assessment: pt reports soreness in thigh and discusses that he feels his greatest barrier is weakness in stiffness from his hip to his knee.  pt continues to show progress in gait mechanics and posture with mobility. pt also demonstrates improved tolerance to weight bearing today but continues to demonstrate an antalgic gait. pt is CGA with transfers and gait today. pt discusses DC to SNF by the end of the week. DME: TBD. Activity Tolerance: Patient limited by fatigue;Patient limited by pain; Patient limited by endurance  Discharge Recommendations: 24 hour supervision or assist;Home with Home health PT  PT Equipment Recommendations  Equipment Needed: Yes  Wheelchair: Standard  Other: CTA, pt may require manual w/c with cushion, pending progression of mobility and gait, pt reports he owns RW, 4TF and electric scooter for community    Goals  Patient Goals   Patient goals : \"Get stronger so I can get outside and be able to do things I on my own\"  Short Term Goals  Time Frame for Short term goals: 10 days 6/12/22  Short term goal 1: Pt will complete supine to/from sit with Cristobal- GOAL NOT assessed   Short term goal 2: Pt will complete sit to/from stand with RW with Cristobal- GOAL MET, CGA  Short term goal 3: Pt will complete bed <> chair with RW with modA- GOAL MET, CGA  Short term goal 4: Pt will ambulate x 15' with RW with modA without LOB- GOAL MET with CGA and RW > 15 ft   Long Term Goals  Time Frame for Long term goals : 21 days 6/23/22  Long term goal 1: Pt will complete supine to/from sit with SBA  Long term goal 2: Pt will complete functional t/f with RW with CGA  Long term goal 3: Pt will ambulate x 48' with RW with CGA without LOB  Long term goal 4: Pt will complete car t/f with RW and CGA    PLAN OF CARE/SAFETY  Plan  Plan: 5-7 times per week  Plan weeks: 3 weeks  Specific Instructions for Next Treatment: Progress mobility as tolerated  Current Treatment Recommendations: Strengthening; Wheelchair mobility training;Equipment evaluation, education, & procurement;Balance training;Gait training;Pain management; Modalities;Stair training;Functional mobility training;Transfer training;Neuromuscular re-education;Home exercise program;Positioning; Safety education & training;Manual Therapy - Soft Tissue Mobilization;Patient/Caregiver education & training; Therapeutic activities; Endurance training;Manual Therapy - Joint Manipulation;Cognitive reorientation  Safety Devices  Type of Devices: All fall risk precautions in place;Call light within reach; Patient at risk for falls;Gait belt;Nurse notified; Left in chair;Chair alarm in place  Restraints  Restraints Initially in Place: No    EDUCATION  Education  Education Given To: Patient  Education Provided: Role of Therapy;Plan of Care;Precautions; Safety; Fall Prevention Strategies;DME/Home Modifications;Transfer Training;Equipment;ADL Function;Cognition;Mobility Training  Education Provided Comments: pt educated on importance of OOB mobility, need for safety with fatigue and buckling, safe use of hand placement and RW - requires reinforcement  Education Method: Demonstration;Verbal  Barriers to Learning: Cognition; Hearing  Education Outcome: Continued education needed;Verbalized understanding; Unable to demonstrate understanding        Therapy Time   Individual Concurrent Group Co-treatment   Time In 1230         Time Out 1330         Minutes 60           Timed Code Treatment Minutes: [id number] [facility], 06/13/22 at 3:12 PM

## 2022-06-13 NOTE — PROGRESS NOTES
Speech Language Pathology  [facility]: ACUTE REHAB UNIT  SPEECH-LANGUAGE PATHOLOGY      [x] Daily  [] Weekly Care Conference Note  [] Discharge    Patient:Saravanan Red      JUE:[birth year]  RNA:[id number]  Rehab Dx/Hx: S/p left hip fracture [Z87.81]    Precautions: falls  Home situation: pt lives with wife, active , indep with meds, wife manages finances and other household tasks   ST Dx: [] Aphasia  [] Dysarthria  [] Apraxia   [] Oropharyngeal dysphagia [] Cognitive Impairment  [] Other:   Date of Admit: 6/3/2022  Room #: 0151/0151-01    Current functional status (updated daily):         Pt being seen for : [] Speech/Language Treatment  [] Dysphagia Treatment [x] Cognitive Treatment  [] Other:  Communication: [x]WFL  [] Aphasia  [] Dysarthria  [] Apraxia  [] Pragmatic Impairment [] Non-verbal  [] Hearing Loss  [] Other:   Cognition: [] WFL  [x] Mild  [x] Moderate  [] Severe [] Unable to Assess  [] Other:  Memory: [] WFL  [x] Mild  [x] Moderate  [] Severe [] Unable to Assess  [] Other:  Behavior: [x] Alert  [x] Cooperative  [x]  Pleasant  [] Confused  [] Agitated  [] Uncooperative  [] Distractible [] Motivated  [] Self-Limiting [] Anxious  [] Other:  Endurance:  [x] Adequate for participation in SLP sessions  [] Reduced overall  [] Lethargic  [] Other:  Safety: [] No concerns at this time  [x] Reduced insight into deficits  [x]  Reduced safety awareness [] Not following call light procedures  [] Unable to Assess  [] Other:    Current Diet Order:ADULT DIET;  Regular; 4 carb choices (60 gm/meal)  Swallowing Precautions: upright for all intake, stay upright for at least 30 mins after intake, small bites/sips, alternate bites/sips and slow rate of intake        Date: 6/13/2022      Tx session 1  [id number] Tx session 2  All tx needs met in session 1    Total Timed Code Min 60    Total Treatment Minutes 60    Individual Treatment Minutes 60    Group Treatment Minutes 0    Co-Treat Minutes 0    Variance/Reason:  N/A Pain Denies     Pain Intervention [] RN notified  [] Repositioned  [] Intervention offered and patient declined  [x] N/A  [] Other: [] RN notified  [] Repositioned  [] Intervention offered and patient declined  [] N/A  [] Other:   Subjective     Pt alert and oriented, cooperative and agreeable to participate in therapy. Pt seen sitting in bed. Objective:  Goals     Dysphagia Goals:  Short-term Goals  Timeframe for Short-term Goals: 14 days (6/18/22)     Goal 1. The patient will tolerate recommended diet without observed clinical signs of aspiration. Goal met 06/09/22. Pt tolerating IDDSI Level 7 regular solids with thin liquids, meds whole with PO as LRD. Goal 2. The patient/caregiver will demonstrate understanding of compensatory strategies for improved swallowing safety. Goal met 06/09/22. Pt tolerating IDDSI Level 7 regular solids with thin liquids, meds whole with PO as LRD. Speech/Lang/Cog Goals:  Short-term Goals  Timeframe for Short-term Goals: 14 days (6/18/22)    Goal 1: Pt will complete graded recall tasks using compensatory strategies with 80% acc given mod cues. Pt completed recall task: word list retention/category inclusion. EX: Elephant, table, squirrel, cloud- repeat words back. Which ones are animals?  - Pt able to recall 94% acc indep. - Pt able to state correct words within category/question with 76% acc indep. Improved to 90% with min verbal cues. Pt also completed paragraph retention/recall receptive language activity. - Pt able to recall information from paragraph with 73% acc indep. Pt did not improve accuracy after min verbal cues. Goal 2: Pt will complete executive function tasks (meds, money, math, time, etc) with 80% acc given mod cues. Pt able to complete math word problems this date. Pt needed to write down math problems to find answer 2/10 questions. - Pt 80% acc indep. - Pt improved to 90% accuracy with min verbal cues.  Pt needed wait time, and repetition to improve recall of stimulus question/details within question. Goal 3: Pt will complete verbal and visual reasoning tasks with 80% acc given mod cues   Verbal reasoning task:  -ex: name a state that starts with letter A  -100% acc indep        Goal 4: Pt will complete problem solving and thought organization tasks with 80% acc given mod cues   Multiple definitions task:  -pt given a word and asked to provide two definitions per word  -ex: park-park the car, play at the park  -86% acc indep improving to 100% acc given min cues       Goal 5: Pt will complete graded attention tasks (e.g. sustained, selective, alternative, divided) with 80% acc given min cues   Pt able to sustain attention throughout session within functional limits. See goals above. Other areas targeted: N/A    Education:   Pt provided edu on compensatory strategies for recall tasks. Pt stated he was going to be discharged to a \"rehab facility\" (not to his home), upon discharge from the ARU. Safety Devices: [x] Call light within reach  [x] Chair alarm activated  [] Bed alarm activated  [] Other: [] Call light within reach  [] Chair alarm activated  [] Bed alarm activated  [] Other:    Assessment: Pt seen for cognitive therapy this date. Pt was cooperative and willing to participate in tx. Pt able to complete word level recall task with 94% accuracy, and was able to answer category questions about these words with 76% acc indep. This improved to 90% acc with min verbal cues. Pt able to recall paragraph information with 73% acc indep. He achieved 80% acc indep within math executive functioning activity. With min cues, he was bale to improve this to 90% acc. With repetition, wait time, and min cues, pt's recall improved. Pt stated his goal for the day was to \"improve upon his walking. \" Pt coughed at rest multiple times throughout session. Pt stated that his doctor and nurse knew about the cough, and his \"congestion. \" Pt denied any feelings of things stuck in his throat, etc. Pt continues to make progress towards goals and remains motivated to improve. Continue goals above. Plan: Continue as per plan of care. Additional Information:     Barriers toward progress: Confusion and Cognitive deficit  Discharge recommendations:  [] Home independently  [] Home with assistance [x]  24 hour supervision  [] ECF [] Other:  Continued Tx Upon Discharge: ? [x] Yes [] No [] TBD based on progress while on ARU [] Vital Stim indicated [] Other:   Estimated discharge date: 06/18/22    Interventions used this date:  [] Speech/Language Treatment  [] Instruction in HEP [] Group [] Dysphagia Treatment [x] Cognitive Treatment   [] Other: Total Time Breakdown / Charges    Time in Time out Total Time / units   Cognitive Tx 0800 0900 60min/ 4 units    Speech Tx -- -- --   Dysphagia Tx -- -- --       Electronically Signed by     Faye FOREMAN CCC-SLP  Speech Language Pathologist  [id number]     Bubba PALACIOS CCC-SLP  Speech-Language Pathologist  [id number]

## 2022-06-14 LAB
GLUCOSE BLD-MCNC: 139 MG/DL (ref 70–99)
GLUCOSE BLD-MCNC: 185 MG/DL (ref 70–99)
GLUCOSE BLD-MCNC: 192 MG/DL (ref 70–99)
GLUCOSE BLD-MCNC: 277 MG/DL (ref 70–99)
PERFORMED ON: ABNORMAL

## 2022-06-14 PROCEDURE — 97129 THER IVNTJ 1ST 15 MIN: CPT

## 2022-06-14 PROCEDURE — 97535 SELF CARE MNGMENT TRAINING: CPT

## 2022-06-14 PROCEDURE — 97530 THERAPEUTIC ACTIVITIES: CPT

## 2022-06-14 PROCEDURE — 6370000000 HC RX 637 (ALT 250 FOR IP): Performed by: STUDENT IN AN ORGANIZED HEALTH CARE EDUCATION/TRAINING PROGRAM

## 2022-06-14 PROCEDURE — 97110 THERAPEUTIC EXERCISES: CPT

## 2022-06-14 PROCEDURE — 6360000002 HC RX W HCPCS: Performed by: STUDENT IN AN ORGANIZED HEALTH CARE EDUCATION/TRAINING PROGRAM

## 2022-06-14 PROCEDURE — 1280000000 HC REHAB R&B

## 2022-06-14 PROCEDURE — 97130 THER IVNTJ EA ADDL 15 MIN: CPT

## 2022-06-14 PROCEDURE — 97116 GAIT TRAINING THERAPY: CPT

## 2022-06-14 RX ADMIN — GABAPENTIN 300 MG: 300 CAPSULE ORAL at 09:03

## 2022-06-14 RX ADMIN — TRAZODONE HYDROCHLORIDE 50 MG: 50 TABLET ORAL at 23:18

## 2022-06-14 RX ADMIN — OXYCODONE 2.5 MG: 5 TABLET ORAL at 23:18

## 2022-06-14 RX ADMIN — ENOXAPARIN SODIUM 40 MG: 100 INJECTION SUBCUTANEOUS at 09:01

## 2022-06-14 RX ADMIN — INSULIN GLARGINE 22 UNITS: 100 INJECTION, SOLUTION SUBCUTANEOUS at 21:04

## 2022-06-14 RX ADMIN — METHOCARBAMOL 500 MG: 500 TABLET ORAL at 13:34

## 2022-06-14 RX ADMIN — SERTRALINE 100 MG: 50 TABLET, FILM COATED ORAL at 09:01

## 2022-06-14 RX ADMIN — TROSPIUM CHLORIDE 20 MG: 20 TABLET, FILM COATED ORAL at 06:10

## 2022-06-14 RX ADMIN — DOCUSATE SODIUM 50 MG AND SENNOSIDES 8.6 MG 2 TABLET: 8.6; 5 TABLET, FILM COATED ORAL at 09:00

## 2022-06-14 RX ADMIN — INSULIN LISPRO 1 UNITS: 100 INJECTION, SOLUTION INTRAVENOUS; SUBCUTANEOUS at 21:05

## 2022-06-14 RX ADMIN — PANTOPRAZOLE SODIUM 40 MG: 40 TABLET, DELAYED RELEASE ORAL at 06:10

## 2022-06-14 RX ADMIN — ASPIRIN 81 MG: 81 TABLET, COATED ORAL at 09:00

## 2022-06-14 RX ADMIN — VALSARTAN 80 MG: 80 TABLET, FILM COATED ORAL at 09:00

## 2022-06-14 RX ADMIN — METHOCARBAMOL 500 MG: 500 TABLET ORAL at 21:04

## 2022-06-14 RX ADMIN — ATORVASTATIN CALCIUM 40 MG: 40 TABLET, FILM COATED ORAL at 21:03

## 2022-06-14 RX ADMIN — INSULIN LISPRO 6 UNITS: 100 INJECTION, SOLUTION INTRAVENOUS; SUBCUTANEOUS at 16:48

## 2022-06-14 RX ADMIN — MICONAZOLE NITRATE: 2 POWDER TOPICAL at 21:11

## 2022-06-14 RX ADMIN — GABAPENTIN 300 MG: 300 CAPSULE ORAL at 21:04

## 2022-06-14 RX ADMIN — CYANOCOBALAMIN TAB 500 MCG 1000 MCG: 500 TAB at 09:01

## 2022-06-14 RX ADMIN — AMLODIPINE BESYLATE 10 MG: 5 TABLET ORAL at 09:00

## 2022-06-14 RX ADMIN — METHOCARBAMOL 500 MG: 500 TABLET ORAL at 09:00

## 2022-06-14 RX ADMIN — OXYCODONE 2.5 MG: 5 TABLET ORAL at 13:37

## 2022-06-14 RX ADMIN — METHOCARBAMOL 500 MG: 500 TABLET ORAL at 16:48

## 2022-06-14 RX ADMIN — TROSPIUM CHLORIDE 20 MG: 20 TABLET, FILM COATED ORAL at 16:47

## 2022-06-14 RX ADMIN — INSULIN LISPRO 2 UNITS: 100 INJECTION, SOLUTION INTRAVENOUS; SUBCUTANEOUS at 12:01

## 2022-06-14 ASSESSMENT — PAIN SCALES - WONG BAKER
WONGBAKER_NUMERICALRESPONSE: 0

## 2022-06-14 ASSESSMENT — PAIN - FUNCTIONAL ASSESSMENT
PAIN_FUNCTIONAL_ASSESSMENT: ACTIVITIES ARE NOT PREVENTED

## 2022-06-14 ASSESSMENT — PAIN SCALES - GENERAL
PAINLEVEL_OUTOF10: 0
PAINLEVEL_OUTOF10: 7
PAINLEVEL_OUTOF10: 0

## 2022-06-14 ASSESSMENT — PAIN DESCRIPTION - PAIN TYPE
TYPE: SURGICAL PAIN
TYPE: SURGICAL PAIN

## 2022-06-14 ASSESSMENT — PAIN DESCRIPTION - DESCRIPTORS
DESCRIPTORS: ACHING
DESCRIPTORS: ACHING

## 2022-06-14 ASSESSMENT — PAIN DESCRIPTION - ONSET
ONSET: GRADUAL
ONSET: GRADUAL

## 2022-06-14 ASSESSMENT — PAIN DESCRIPTION - FREQUENCY
FREQUENCY: INTERMITTENT
FREQUENCY: INTERMITTENT

## 2022-06-14 ASSESSMENT — PAIN DESCRIPTION - ORIENTATION
ORIENTATION: LEFT
ORIENTATION: LEFT

## 2022-06-14 ASSESSMENT — PAIN DESCRIPTION - LOCATION
LOCATION: HIP
LOCATION: RIB CAGE
LOCATION: HIP

## 2022-06-14 NOTE — PROGRESS NOTES
Occupational Therapy  Facility/Department: [facility]  Rehabilitation Occupational Therapy Daily Treatment Note    Date: 22  Patient Name: Silas Smyth       Room: [facility] [place]-  MRN: [id number]  Account: [de-identified]   : 1945  (71 y.o.) Gender: male                    Past Medical History:  has a past medical history of Diabetes mellitus ([address] and Hypertension. Past Surgical History:   has no past surgical history on file. Restrictions  Restrictions/Precautions: Weight Bearing; Fall Risk;General Precautions  Other position/activity restrictions: balance concerns  Right Lower Extremity Weight Bearing: Weight Bearing As Tolerated  Left Lower Extremity Weight Bearing: Weight Bearing As Tolerated    Subjective  Subjective: Pt in bed, eating breakfast, pleasant, agreeable to OT session, pain 4/10 on L hip/leg, /70, HR 74, O2 sats 91% on RA, temp 97.8. Restrictions/Precautions: Weight Bearing; Fall Risk;General Precautions             Objective     Cognition  Overall Cognitive Status: Exceptions  Arousal/Alertness: Appropriate responses to stimuli  Following Commands: Follows one step commands with repetition; Follows one step commands with increased time  Attention Span: Attends with cues to redirect  Memory: Decreased recall of recent events;Decreased short term memory;Decreased recall of precautions  Safety Judgement: Decreased awareness of need for assistance  Problem Solving: Assistance required to identify errors made;Assistance required to generate solutions;Assistance required to implement solutions  Insights: Decreased awareness of deficits  Initiation: Requires cues for some  Sequencing: Requires cues for some  Orientation  Overall Orientation Status: Within Functional Limits   Perception  Overall Perceptual Status: Impaired  Unilateral Attention: Cues to maintain midline in standing;Cues to maintain midline in sitting  Initiation: Cues to initiate tasks  Motor Planning: Appears intact  Perseveration: Perseverates during conversation     ADL  Feeding  Assistance Level: Independent  Grooming/Oral Hygiene  Assistance Level: Minimal assistance  Skilled Clinical Factors: for balance while standing to comb hair, mod I to sit to brush teeth  Upper Extremity Bathing  Assistance Level: Supervision  Lower Extremity Bathing  Equipment Provided: Long-handled sponge  Assistance Level: Stand by assist  Skilled Clinical Factors: while seated, increased leaning to L as task progressed  Upper Extremity Dressing  Assistance Level: Supervision  Skilled Clinical Factors: due to L lateral lean while donning shirt while seated  Lower Extremity Dressing  Equipment Provided: Reachers  Assistance Level: Minimal assistance  Skilled Clinical Factors: min A for balance while standing to pull up pants, mod VCs for use of reacher to thread BLEs into brief/shorts, increased L lateral lean with poor ability to correct  Putting On/Taking Off Footwear  Equipment Provided: Sock aid (long handled shoe horn)  Assistance Level: Minimal assistance  Skilled Clinical Factors: Cues for use of sock-aid and assist for pulling L sock on fully  Tub/Shower Transfers  Type: Shower  Transfer From: Abhijeet Marr  Transfer To: Tub transfer bench  Additional Factors: With handrails;Verbal cues;Cues for hand placement  Assistance Level: Minimal assistance          Functional Mobility  Device: Rolling walker  Activity: To/From bathroom  Assistance Level: Minimal assistance  Skilled Clinical Factors: SBA to start session, increased lean to L as pt fatigued during session  Bed Mobility  Overall Assistance Level: Supervision  Sit to Supine  Assistance Level: Supervision  Supine to Sit  Assistance Level: Supervision  Transfers  Surface: From chair with arms; Wheelchair  Additional Factors: Verbal cues; Hand placement cues; With handrails  Device: Walker  Sit to Stand  Assistance Level: Contact guard assist  Stand to Sit  Assistance Level: Minimal assistance  Skilled Clinical Factors: min A when fatiguing and leaning more to L, SBA to start session  Bed To/From Chair  Technique: Stand step  Assistance Level: Contact guard assist   OT Exercises  Dynamic Sitting Balance Exercises: a-p leaning with 2# medicine ball x20, obliques x20 with medicine ball, circuit for reaching with 2# medicine ball to reach to 9 o'clock, lean back, 11 o'clock, lean back, 1 o'clock, lean back, 3 o'clock 3 reps of 3. Assessment  Assessment  Assessment: Pt agreeable to OT session. Pt demonstrated fair balance this date, worsening during ADLs but improved toward end of session. Pt completed mobility to bathroom with RW and SBA and then began leaning to L in shower but able to hold self up with SBA while leaning, but unable to correct. Pt completed bathing with CGA and dressing with min A for balance when standing. Pt required mod A to don socks/shoes with poor use of a/e for footwear. Pt completed standing at sink for ~1 minute prior to requiring walk out to w/c due to fatigue and increased lean to L. Pt completed dynamic sitting balance exercises and core strengthening on mat table with SPV/mod I for sitting balance and improved midline posture. Continue POC. Activity Tolerance: Patient limited by fatigue;Patient limited by endurance  Discharge Recommendations: 24 hour supervision or assist;Home with Home health OT;S Level 1  OT Equipment Recommendations  Equipment Needed: Yes  Mobility Devices: ADL Assistive Devices  ADL Assistive Devices: Toileting - 3-in-1 Commode  Safety Devices  Safety Devices in place: Yes  Type of devices: All fall risk precautions in place;Gait belt;Patient at risk for falls;Call light within reach;Nurse notified; Left in chair;Chair alarm in place    Patient Education  Education  Education Given To: Patient  Education Provided: Role of Therapy; ADL Function;Plan of Care;Transfer Training; Safety; Fall Prevention Strategies; Equipment; Mobility Training;Precautions;DME/Home Modifications;Cognition; Energy Conservation  Education Provided Comments: balance training, reaching outside STEVEN, dynamic balance, d/c planning, use of a/e, safety with ADLs  Education Method: Verbal;Demonstration  Barriers to Learning: Cognition  Education Outcome: Verbalized understanding;Demonstrated understanding;Continued education needed    Plan  Plan  Times per Week: 5/7 days/week  Plan Weeks: 3 weeks  Specific Instructions for Next Treatment: dynamic UE with static standing, dynamic seated and standing balance, getting up from floor  Current Treatment Recommendations: Strengthening;ROM;Balance training;Functional mobility training; Wheelchair mobility training; Safety education & training;Neuromuscular re-education;Cognitive reorientation; Endurance training;Pain management;Self-Care / ADL; Home management training;Cognitive/Perceptual training;Coordination training;Equipment evaluation, education, & procurement;Patient/Caregiver education & training    Goals  Short Term Goals  Time Frame for Short term goals: 1 week- 6/10/22  Short Term Goal 1: Pt will perform functional transfers with max A. GOAL MET 6/9/22 Pt performed functional transfers with min A. Short Term Goal 2: Pt will complete toileting with max A. GOAL MET 6/10/22 Pt completed toileting with max A with assist of nursing per patient report. Short Term Goal 3: Pt will perform LB dressing with mod A. GOAL MET 6/9/22 Pt performed LB dressing with min A. Short Term Goal 4: Pt will complete bathing with mod A. GOAL MET 6/9/22 Pt completed bathing with CGA. Long Term Goals  Time Frame for Long term goals : 3 weeks- 6/23/22  Long Term Goal 1: Pt will complete functional transfers with CGA. -- GOAL MET 6/13  Long Term Goal 2: Pt will perform bathing with CGA. GOAL MET 6/14/22 Pt performed bathing with CGA. Long Term Goal 3: Pt will complete full body dressing with CGA.   Long Term Goal 4: Pt will perform toileting with CGA.  Long Term Goal 5: Pt will perform grooming at sink with mod I.-- GOAL MET 6/13      Therapy Time   Individual Concurrent Group Co-treatment   Time In 0730         Time Out 0900         Minutes 90         Timed Code Treatment Minutes: Mehreen Rodrigues

## 2022-06-14 NOTE — PATIENT CARE CONFERENCE
[address] [facility]  Inpatient Rehabilitation  Weekly Team Conference Note    Date: 2022  Patient Name: Felice Travis        MRN: [id number]    : 1945  (78 y.o.)  Gender: male   Referring Practitioner: Maddi Alvarenga MD  Diagnosis: Fall, L hip fx s/p IMN      Interventions to be utilized toward barriers to discharge, per discipline:  [address] observed barriers to dc: Pain, Limited family support, Cognitive deficit, Limited safety awareness, Lower extremity weakness, Incontinence of bladder and Medication managment  Nursing interventions: High Fall Risk, Bladder training  Family Education: No  Fall Risk:  Yes      Physical therapy observed barriers to dc:            Baseline: ind with no AD, pt reports he uses electric scooter at times. Hx of multiple falls at baseline               Current level: min A bed mobility, CGA transfers with RW, CGA  gait up to 110 ft, CGA with cues for 4 steps with BHR               Barriers to DC: pain, endurance, weakness, insight, cog , hx of falls               Needs in order to achieve dc home/next level of care: pt will have to be at a mod ind level or better to d/c home. rec HHPT vs OPPT pending progress.  per pt, wife looking into SNF [facility] as she does not want to provide               Physical therapy interventions:   Current Treatment Recommendations: Strengthening,Wheelchair mobility training,Equipment evaluation, education, & procurement,Balance training,Gait training,Pain management,Modalities,Stair training,Functional mobility training,Transfer training,Neuromuscular re-education,Home exercise program,Positioning,Safety education & training,Manual Therapy - Soft Tissue Mobilization,Patient/Caregiver education & training,Therapeutic activities,Endurance training,Manual Therapy - Joint Manipulation,Cognitive reorientation      PHYSICAL THERAPY        Recommendation:   PT Equipment Recommendations  Equipment Needed: Yes  Mobility Devices: Wheelchair  Wheelchair: Standard  Other: CTA, pt may require manual w/c with cushion, pending progression of mobility and gait, pt reports he owns RW, [id number] and electric scooter for community    Assessment: pt reports soreness in thigh and discusses that he feels his greatest barrier is weakness in stiffness from his hip to his knee. pt continues to show progress in gait mechanics and posture with mobility. pt also demonstrates improved tolerance to weight bearing today but continues to demonstrate an antalgic gait. pt is CGA with transfers and gait today. pt discusses DC to SNF by the end of the week. DME: TBD. Occupational therapy observed barriers to dc:    Baseline: mod I all ADLs and transfers              Current level: SBA/CGA transfers, CGA/min A LB ADLs, variable balance/posterior lean              Barriers to DC: decreased balance, decreased insight              Needs in order to achieve dc home/next level of care: SPV/mod I all ADLs and transfers to return home    Occupational Therapy interventions:  Current Treatment Recommendations: Strengthening,ROM,Balance training,Functional mobility training,Wheelchair mobility training,Safety education & training,Neuromuscular re-education,Cognitive reorientation,Endurance training,Pain management,Self-Care / ADL,Home management training,Cognitive/Perceptual training,Coordination training,Equipment evaluation, education, & procurement,Patient/Caregiver education & training      OCCUPATIONAL THERAPY  Assessment  Assessment: Pt agreeable to OT session. Pt demonstrated fair balance this date, worsening during ADLs but improved toward end of session. Pt completed mobility to bathroom with RW and SBA and then began leaning to L in shower but able to hold self up with SBA while leaning, but unable to correct. Pt completed bathing with CGA and dressing with min A for balance when standing. Pt required mod A to don socks/shoes with poor use of a/e for footwear.  Pt completed standing at sink for ~1 minute prior to requiring walk out to w/c due to fatigue and increased lean to L. Pt completed dynamic sitting balance exercises and core strengthening on mat table with SPV/mod I for sitting balance and improved midline posture. Continue POC. Activity Tolerance: Patient limited by fatigue;Patient limited by endurance  Discharge Recommendations: 24 hour supervision or assist;Home with Home health OT;S Level 1  OT Equipment Recommendations  Equipment Needed: Yes  Mobility Devices: ADL Assistive Devices  ADL Assistive Devices: Toileting - 3-in-1 Commode  Safety Devices  Safety Devices in place: Yes  Type of devices: All fall risk precautions in place;Gait belt;Patient at risk for falls;Call light within reach;Nurse notified; Left in chair;Chair alarm in place      Speech therapy observed barriers to dc:    Baseline: pt lives with wife, active , indep with meds, wife manages finances and other household tasks    Current level: mild-mod cog deficit   Barriers to DC: reduced insight negatively impacting safety at times   Needs in order to achieve dc home/next level of care: 24 assist, assist with meds, finances and carryover of compensatory strategies    Speech Therapy interventions:  Dysphagia: pt met dysphasia goals on 6/9, but continue to monitor  Speech/Language/Cognition: Compensatory strategy training and carryover, recall/STM, problem solving, reasoning, exec function, thought organization, attention. SPEECH THERAPY    Pt seen for cog tx this date. Pt alert, oriented, and cooperative during therapy. Pt completed multiple cog tasks this date to target functional daily problem solving, executive functioning, recall, and visual reasoning skills. Pt completed all tasks independently and continues to make progress on his cog goals. Pt at times needed repetition of stimulus questions/words.  Pt able to complete money management executive function task with 100% accuracy in 1 trial, 92% in 1 trial. Pt able to complete thought organization tasks with 80% and 90% acc indep in 2/2 trials. Pt received continued edu on cog goals and progress, along with safe swallow strategies at meal times. Pt stated he had difficulty eating regular solids this date at breakfast, but stated it oculd be due to his baseline cough and/or too big of a bite. SLP will continue to follow up/monitor dysphagia goals and compensatory strategies with pt. Pt stated his goal as to \"get rid of this pain\" concerning his hip pain. He stated he was not in any pain during SLP tx this date. Continue goals above.        NUTRITION  Weight: 207 lb 14.4 oz (94.3 kg) / Body mass index is 31.61 kg/m². Diet Order: ADULT DIET; Regular; 4 carb choices (60 gm/meal)  PO Meals Eaten (%): 76 - 100%  Education: Education declined (handouts left at bedside)      CASE MANAGEMENT  Assessment: 78 yr old male. Dx:S/p left hip fracture. Patient will go to [facility] for discharge. [facility] p/u @ 0715 on 06/18. DME:Defer to SNF. Will need rapid 24-48 hour prior to DC. Family agreeable to DCP. Interdisciplinary Goals:   1.)pt will ambulate about unit with RW and SBA  2.) Pt will complete toileting with SPV.  3.) Pt will demonstrate carryover of compensatory memory strategies for improve recall and safety awareness across all disciplines. 4.) Pt will be free from falls at discharge  5.) Pt with have no s/s of surgical infection    [x]  Family Training discussed at conference- pt refused family training, pt going to SNF.        Discharge Plan   Estimated discharge date: 6/18/2022  Destination: SNF  Pass:No  Services at Discharge: ongoing ST, PT, OT, and nursing   Equipment at Discharge: N/A     Team Members Present at Conference:  : Mike Fitzgerald RN    Occupational Therapist: Syeda Jacobson OTR/L  Physical Therapist: Emi Andrews PT, DPT, Amanda SOLORZANO  Speech Therapist: Rishi Zaldivar MA CCC-SLP  Nurse: Harper Newton RN  Dietician: Bebeto Wei RDN, LD  : Angeles Jones OTR/TIFFANY  Psychiatry: N/A    Family members present at conference: No      I led this team conference and I approve the established interdisciplinary plan of care as documented within the medical record of Chikis Sanchez.     MD: Electronically signed by Eleanor Brittle, MD on 6/15/2022 at 5:19 PM

## 2022-06-14 NOTE — PROGRESS NOTES
Physical Therapy  Facility/Department: [facility]  Rehabilitation Physical Therapy Treatment Note    NAME: Nany Lezama  : 1945 (78 y.o.)  MRN: [id number]  CODE STATUS: Full Code    Date of Service: 22       Restrictions:  Restrictions/Precautions: Weight Bearing; Fall Risk;General Precautions  Lower Extremity Weight Bearing Restrictions  Right Lower Extremity Weight Bearing: Weight Bearing As Tolerated  Left Lower Extremity Weight Bearing: Weight Bearing As Tolerated  Position Activity Restriction  Other position/activity restrictions: balance concerns     SUBJECTIVE  Subjective  Subjective: pt found in recliner. Pain: pt reports 6/10 pain in L hip when sitting. States that is feels very stiff          OBJECTIVE  Cognition  Overall Cognitive Status: WFL  Arousal/Alertness: Appropriate responses to stimuli  Following Commands: Follows one step commands with repetition; Follows one step commands with increased time  Attention Span: Attends with cues to redirect  Memory: Decreased recall of recent events;Decreased short term memory;Decreased recall of precautions  Safety Judgement: Decreased awareness of need for assistance  Problem Solving: Assistance required to identify errors made;Assistance required to generate solutions;Assistance required to implement solutions  Insights: Decreased awareness of deficits  Initiation: Requires cues for some  Sequencing: Requires cues for some  Orientation  Overall Orientation Status: Within Functional Limits  Orientation Level: Oriented to person;Oriented to situation;Oriented to time;Oriented to place    Functional Mobility     PT FOUND IN RECLINER. VITALS ASSESSED   STS from recliner to RW CGA. Gait O227'+24' with RW and CGA. Pt required several minute seated rest break d/t L LE fatigue and discomfort. Pt continues to show reduced step length/height in swing phase of R LE d/t reduced weight bearing toelrance on L.  PT continues to provide cue to equalize step lengths and maintain upright posture. Posture has improved but as pt fatigues rounding shoulder and fwd trunk lean begin. STS from w/c to RW CGA. Stepping Activity 2 min and 30 sec: pt STS from plinth to RW--> standing with neutral STEVEN--> pt walks to colored dots oriented in amanda shape that corresponds to PT cues then returns to starting position. PT provided CGA and pt utilizes RW. Pt cued to side step, march, or walk backward to each target. PT also cues pt to reach target in as few steps as possible. Pt is able to greatly increase step length but only for a few repetitions before reporting increased pain    STS from  to RW SBA     Dynamic standing 3 min and 30 sec: pt stands with neutral STEVEN/ tandem  STEVEN with RLE+LLE leading/ narrow STEVEN--> toss bags toward cornSpireon board. PT provided CGA. Pt cued to not use HHA. Pt has increased difficulty when tandem standing with L LE back d/t increased demand on involved limb. Several minor LOB but pt able to correct with protective reaching/ stepping strategy. STS from [facility] to  SBA    Dynamic standing: Tandem standing--> bounce large blue theraball to PT--> catch ball as it is bounced back. PT provided CGA. Pt continues to show increased difficulty with L LE [place] in stagger stance. Pt cued to not use UE support. STS from [facility] to  CGA. In // bars with 2lb ankle weights: fwd/bwd marching, side stepping, 3-way hip. Pt cued to increase ROM and make larger movements. Pt is able to accommodate for 3-5 repetitions before he fatigues and reports LLE discomfort. Completed 10 reps each. Self-propel WC to room 50'. Stand pivot from [facility] to recliner. CGA  Pt in recliner at end of session with call light/needs within reach, alarm donned.     ASSESSMENT/PROGRESS TOWARDS GOALS  Vital Signs  Heart Rate: 76  Heart Rate Source: Monitor  BP: 117/76  BP Location: Left upper arm  MAP (Calculated): 89.67  SpO2: 96 %  O2 Device: None (Room air)    Assessment  Assessment: pt continues to report hip and thigh discomfort. pt states that after sitting it feels stiff and achy throughout his thigh. pt also demonstrates improvements in weight bearing tolerance and gait mechanincs. An antalgic gait is still noted with reduced step length/height when stepping with R LE. pt is CGA with gait, SBA-CGA with transfers. pt has discussed families desire for him to DC to SNF by the end of the week. DME: TBD  Activity Tolerance: Patient limited by fatigue;Patient limited by endurance; Patient limited by pain; Patient tolerated treatment well  Discharge Recommendations: 24 hour supervision or assist;Home with Home health PT  PT Equipment Recommendations  Equipment Needed: Yes  Wheelchair: Standard  Other: CTA, pt may require manual w/c with cushion, pending progression of mobility and gait, pt reports he owns RW, 2TY and electric scooter for community    Goals  Patient Goals   Patient goals : \"Get stronger so I can get outside and be able to do things I on my own\"  Short Term Goals  Time Frame for Short term goals: 10 days 6/12/22  Short term goal 1: Pt will complete supine to/from sit with Cristobal- GOAL NOT MET  Short term goal 2: Pt will complete sit to/from stand with RW with Cristobal- GOAL MET  Short term goal 3: Pt will complete bed <> chair with RW with modA- GOAL MET  Short term goal 4: Pt will ambulate x 15' with RW with modA without LOB- GOAL MET  Long Term Goals  Time Frame for Long term goals : 21 days 6/23/22  Long term goal 1: Pt will complete supine to/from sit with SBA  Long term goal 2: Pt will complete functional t/f with RW with CGA  Long term goal 3: Pt will ambulate x 48' with RW with CGA without LOB  Long term goal 4: Pt will complete car t/f with RW and CGA    PLAN OF CARE/SAFETY  Plan  Plan: 5-7 times per week  Plan weeks: 3 weeks  Specific Instructions for Next Treatment: Progress mobility as tolerated  Current Treatment Recommendations: Strengthening; Wheelchair mobility training;Equipment evaluation, education, & procurement;Balance training;Gait training;Pain management; Modalities;Stair training;Functional mobility training;Transfer training;Neuromuscular re-education;Home exercise program;Positioning; Safety education & training;Manual Therapy - Soft Tissue Mobilization;Patient/Caregiver education & training; Therapeutic activities; Endurance training;Manual Therapy - Joint Manipulation;Cognitive reorientation  Safety Devices  Type of Devices: All fall risk precautions in place;Call light within reach; Patient at risk for falls;Gait belt;Nurse notified; Left in chair;Chair alarm in place  Restraints  Restraints Initially in Place: No    EDUCATION  Education  Education Given To: Patient  Education Provided: Role of Therapy;Plan of Care;Precautions; Safety; Fall Prevention Strategies;DME/Home Modifications;Transfer Training;Equipment;ADL Function;Cognition;Mobility Training  Education Provided Comments: pt educated on importance of OOB mobility, need for safety with fatigue and buckling, safe use of hand placement and RW - requires reinforcement  Education Method: Demonstration;Verbal  Barriers to Learning: Cognition; Hearing  Education Outcome: Continued education needed;Verbalized understanding; Unable to demonstrate understanding        Therapy Time   Individual Concurrent Group Co-treatment   Time In 1230         Time Out 1330         Minutes 60           Timed Code Treatment Minutes: 60 Minutes       Ghulam Willis, 06/14/22 at 3:08 PM

## 2022-06-14 NOTE — PROGRESS NOTES
Fly Primer  6/14/2022  [id number]    Chief Complaint: S/p left hip fracture    Subjective:   No acute events overnight. Today Lynn Dallas is seen in his room. He reports that he is doing well. He reports that his leg pain is manageable. He denies other acute complaints at this time. ROS: denies f/c, n/v, cp, sob    Objective:  Patient Vitals for the past 24 hrs:   BP Temp Temp src Pulse Resp SpO2   06/14/22 0800 127/77 97.8 °F (36.6 °C) Oral 70 16 94 %   06/13/22 2045 131/60 98 °F (36.7 °C) Oral 71 -- 93 %   06/13/22 1737 -- -- -- -- 16 --   06/13/22 1524 120/68 -- -- 77 -- 93 %   06/13/22 1503 115/71 -- -- 78 -- 91 %     Gen: No distress, pleasant. Seated in chair  HEENT: Normocephalic, atraumatic. CV: extremities well perfused  Resp: No respiratory distress. On room air  Abd: Soft, nontender   Ext: No edema. Neuro: Alert, oriented, appropriately interactive.  Speech fluent, delayed processing    Wt Readings from Last 3 Encounters:   06/12/22 207 lb 14.4 oz (94.3 kg)       Laboratory data:   Lab Results   Component Value Date    WBC 4.4 06/13/2022    HGB 10.3 (L) 06/13/2022    HCT 32.0 (L) 06/13/2022    MCV 79.4 (L) 06/13/2022     06/13/2022       Lab Results   Component Value Date     06/13/2022    K 4.5 06/13/2022     06/13/2022    CO2 28 06/13/2022    BUN 21 06/13/2022    CREATININE 0.9 06/13/2022    GLUCOSE 206 06/13/2022    CALCIUM 8.4 06/13/2022        Therapy progress:  PT  Position Activity Restriction  Other position/activity restrictions: balance concerns  Objective     Sit to Stand: Minimal Assistance  Stand to sit: Minimal Assistance  Bed to Chair: Dependent/Total  Device: Rolling Walker  Assistance: Contact guard assistance,Moderate assistance  Distance: 47 ft, 18 ft ( several 180* turns)  OT  PT Equipment Recommendations  Equipment Needed: Yes  Mobility Devices: Wheelchair  Wheelchair: Standard  Other: CTA, pt may require manual w/c with cushion, pending progression of mobility and gait, pt reports he owns RW, 4RV and electric scooter for community  Equipment Used: Standard toilet  Toilet Transfers Comments: use of Esther Corrales  Assessment        SLP    Pt was seen for cognitive tx this date. Pt seen alert and oriented within chair. Pt completed multiple cognitive tasks: abstract organization, concrete organization, verbal reasoning, executive functioning (time), and recall. Pt complete recall task with 91% acc indep. Pt completed concrete organizational/reasoning task with 100% acc indep. Abstract reasoning task completed with 50% acc indep, improving to 82% acc with min cues. Executive functioning (daily time task) completed with 81% accuracy.  came in during visit, pt told dr that he will be in charge of giving own insulin at home after d/c. Pt stated that grandson will be driving him mostly. Pt observed with several regular solids PO trials and TL via cup, tolerating without any difficulty or s/s of aspiration- all dysphagia goals met this date. Continue goals above. Body mass index is 31.61 kg/m². Assessment and Plan:  Gisselle Sanchez is a 78year old male with a past medical history significant for DM2, HTN, HLD, BERNARDO, COPD, prostate cancer s/p prostatectomy (2019), and GERD who fell down 2 steps and was found to have left hip and femur fracture on 5/6/22 s/p surgery. He was admitted to [facility] on 6/3/22 due to functional deficits below his baseline.     Left Hip fracture  - s/p repair in [place]  - WBAT  - PT and OT     Post-operative Pain  - tylenol PRN, oxycodone 2.5 mg PRN  - gabapentin 300 mg BID  - robaxin scheduled    DM2  - Lantus 22 units nightly  - SSI medium dose correction  - has metformin listed as allergy  - reports he was only taking Lantus at home.  Do not plan to discharge on SSI due to concerns about ability to accurately administer   - family will need training on insulin administration prior to discharge     COPD  - adequately oxygenating on room   - wean oxygen for SpO2>90%     BERNARDO  - CPAP qhs  - respiratory consulted    History of HTN  - home regimen: amlodipine 10 mg, losartan 100 mg  - continue amlodipine  - valsartan 80 mg daily     HLD  - statin     History of Prostate Cancer  - s/p prostatectomy in 2019  - was recently told that his cancer is back and tentative plan to resume chemo  - oxybutynin discontinued due to concerns this was contributing to confusion  - follow up outpatient     Obesity  - BMI 41   - encourage lifestyle modifications     Bowels: Schedule stool softener. Follow bowel movements. Enema or suppository if needed.      Bladder: Check PVR x 3. [address] if PVR > 200ml or if any volume is > 500 ml.      Sleep: Trazodone provided prn.      Follow up appointments: PCP    Services: home health PT, OT, ST, nursing; 24/7 supervision  EDOD: 6/18/22     Tabatha Hernandez.  Debbie Gannon MD 6/14/2022, 12:10 PM

## 2022-06-14 NOTE — PROGRESS NOTES
Speech Language Pathology  [facility]: ACUTE REHAB UNIT  SPEECH-LANGUAGE PATHOLOGY      [x] Daily  [] Weekly Care Conference Note  [] Discharge    Patient:Saravanan Frias      VWW:0[birth year]  Z:[id number]  Rehab Dx/Hx: S/p left hip fracture [Z87.81]    Precautions: falls  Home situation: pt lives with wife, active , indep with meds, wife manages finances and other household tasks   ST Dx: [] Aphasia  [] Dysarthria  [] Apraxia   [] Oropharyngeal dysphagia [] Cognitive Impairment  [] Other:   Date of Admit: 6/3/2022  Room #: 0151/0151-01    Current functional status (updated daily):         Pt being seen for : [] Speech/Language Treatment  [] Dysphagia Treatment [x] Cognitive Treatment  [] Other:  Communication: [x]WFL  [] Aphasia  [] Dysarthria  [] Apraxia  [] Pragmatic Impairment [] Non-verbal  [] Hearing Loss  [] Other:   Cognition: [] WFL  [x] Mild  [x] Moderate  [] Severe [] Unable to Assess  [] Other:  Memory: [] WFL  [x] Mild  [x] Moderate  [] Severe [] Unable to Assess  [] Other:  Behavior: [x] Alert  [x] Cooperative  [x]  Pleasant  [] Confused  [] Agitated  [] Uncooperative  [] Distractible [] Motivated  [] Self-Limiting [] Anxious  [] Other:  Endurance:  [x] Adequate for participation in SLP sessions  [] Reduced overall  [] Lethargic  [] Other:  Safety: [] No concerns at this time  [x] Reduced insight into deficits  [x]  Reduced safety awareness [] Not following call light procedures  [] Unable to Assess  [] Other:    Current Diet Order:ADULT DIET;  Regular; 4 carb choices (60 gm/meal)  Swallowing Precautions: upright for all intake, stay upright for at least 30 mins after intake, small bites/sips, alternate bites/sips and slow rate of intake        Date: 6/14/2022      Tx session 1  [id number] Tx session 2  All tx needs met in session 1    Total Timed Code Min 60    Total Treatment Minutes 60    Individual Treatment Minutes 60    Group Treatment Minutes 0    Co-Treat Minutes 0    Variance/Reason:  N/A Pain Denies pain during tx this date. Pain Intervention [] RN notified  [] Repositioned  [] Intervention offered and patient declined  [x] N/A  [] Other: [] RN notified  [] Repositioned  [] Intervention offered and patient declined  [] N/A  [] Other:   Subjective     Pt alert and oriented, cooperative and agreeable to participate in therapy. Pt seen in chair. Objective:  Goals     Dysphagia Goals:  Short-term Goals  Timeframe for Short-term Goals: 14 days (6/18/22)     Goal 1. The patient will tolerate recommended diet without observed clinical signs of aspiration. Goal met 06/09/22. Pt tolerating IDDSI Level 7 regular solids with thin liquids, meds whole with PO as LRD. Pt stated he had difficulty eating regular solids this date at breakfast, but stated it oculd be due to his baseline cough and/or too big of a bite. SLP will continue to follow up/monitor dysphagia goals and compensatory strategies with pt and care team.      Goal 2. The patient/caregiver will demonstrate understanding of compensatory strategies for improved swallowing safety. Goal met 06/09/22. Pt tolerating IDDSI Level 7 regular solids with thin liquids, meds whole with PO as LRD. Speech/Lang/Cog Goals:  Short-term Goals  Timeframe for Short-term Goals: 14 days (6/18/22)    Goal 1: Pt will complete graded recall tasks using compensatory strategies with 80% acc given mod cues. Pt completed multiple recall tasks through various tasks this date. - Pt analyzed problems after listening to a short paragraph stimulus. Pt able to respond to questions with 80% and 100% acc indep. - Pt completed recall task after looking at picture. Pt able to answer with 80% acc indep. No cues needed. Goal 2: Pt will complete executive function tasks (meds, money, math, time, etc) with 80% acc given mod cues.        Pt completed two visual math word problem solving activities this date to target executive functioning.  - Pt completed 100% (12/12) acc indep. Pt completed math word problems: money general questions this date. - Pt completed with 92% acc indep. SLP edu pt on missed questions, with pt stating verbal understanding. Goal 3: Pt will complete verbal and visual reasoning tasks with 80% acc given mod cues   Pt completed time visual reasoning task this date. Time visual reasoning task completed with 83% acc indep. Pt improved to 100% after min verbal cues. Goal 4: Pt will complete problem solving and thought organization tasks with 80% acc given mod cues   Pt completed word deductions task this date to target thought organization.   - Pt completed with 80% acc indep. Pt improved to 90% accuracy with min verbal cues from SLP. Pt improved with wait time and repetitions on some stimulus items. Goal 5: Pt will complete graded attention tasks (e.g. sustained, selective, alternative, divided) with 80% acc given min cues   Pt completed all tasks with attention that was [facility]. See goals above. No cues needed for attention. Other areas targeted: N/A    Education:   SLP edu pt on reasoning for cognitive tx this date. SLP edu pt on safe swallowing strategies for continued safety during meals (sit upright, small bites, etc). Pt stated verbal understanding. Safety Devices: [x] Call light within reach  [x] Chair alarm activated  [] Bed alarm activated  [] Other: [] Call light within reach  [] Chair alarm activated  [] Bed alarm activated  [] Other:    Assessment: Pt seen for cog tx this date. Pt alert, oriented, and cooperative during therapy. Pt completed multiple cog tasks this date to target functional daily problem solving, executive functioning, recall, and visual reasoning skills. Pt completed all tasks independently and continues to make progress on his cog goals. Pt at times needed repetition of stimulus questions/words.  Pt able to complete money management executive function task with 100% accuracy in 1 trial, 92% in 1 trial. Pt able to complete thought organization tasks with 80% and 90% acc indep in 2/2 trials. Pt received continued edu on cog goals and progress, along with safe swallow strategies at meal times. Pt stated he had difficulty eating regular solids this date at breakfast, but stated it oculd be due to his baseline cough and/or too big of a bite. SLP will continue to follow up/monitor dysphagia goals and compensatory strategies with pt. Pt stated his goal as to \"get rid of this pain\" concerning his hip pain. He stated he was not in any pain during SLP tx this date. Continue goals above. Plan: Continue as per plan of care. Additional Information:     Barriers toward progress: Confusion and Cognitive deficit  Discharge recommendations:  [] Home independently  [] Home with assistance [x]  24 hour supervision  [] ECF [] Other:  Continued Tx Upon Discharge: ? [x] Yes [] No [] TBD based on progress while on ARU [] Vital Stim indicated [] Other:   Estimated discharge date: 06/18/22    Interventions used this date:  [] Speech/Language Treatment  [] Instruction in HEP [] Group [] Dysphagia Treatment [x] Cognitive Treatment   [] Other: Total Time Breakdown / Charges    Time in Time out Total Time / units   Cognitive Tx 1030 1130 60min/ 4 units    Speech Tx -- -- --   Dysphagia Tx -- -- --       Electronically Signed by     Elisha FOREMAN  [id number] [facility]  Speech Language Pathologist  [phone]

## 2022-06-15 LAB
GLUCOSE BLD-MCNC: 134 MG/DL (ref 70–99)
GLUCOSE BLD-MCNC: 231 MG/DL (ref 70–99)
GLUCOSE BLD-MCNC: 240 MG/DL (ref 70–99)
GLUCOSE BLD-MCNC: 281 MG/DL (ref 70–99)
PERFORMED ON: ABNORMAL

## 2022-06-15 PROCEDURE — 97130 THER IVNTJ EA ADDL 15 MIN: CPT

## 2022-06-15 PROCEDURE — 97110 THERAPEUTIC EXERCISES: CPT

## 2022-06-15 PROCEDURE — 97116 GAIT TRAINING THERAPY: CPT

## 2022-06-15 PROCEDURE — 97129 THER IVNTJ 1ST 15 MIN: CPT

## 2022-06-15 PROCEDURE — 1280000000 HC REHAB R&B

## 2022-06-15 PROCEDURE — 97530 THERAPEUTIC ACTIVITIES: CPT

## 2022-06-15 PROCEDURE — 6370000000 HC RX 637 (ALT 250 FOR IP): Performed by: STUDENT IN AN ORGANIZED HEALTH CARE EDUCATION/TRAINING PROGRAM

## 2022-06-15 PROCEDURE — 6360000002 HC RX W HCPCS: Performed by: STUDENT IN AN ORGANIZED HEALTH CARE EDUCATION/TRAINING PROGRAM

## 2022-06-15 RX ORDER — NIFEDIPINE 30 MG/1
60 TABLET, EXTENDED RELEASE ORAL DAILY
Status: DISCONTINUED | OUTPATIENT
Start: 2022-06-16 | End: 2022-06-18 | Stop reason: HOSPADM

## 2022-06-15 RX ADMIN — INSULIN LISPRO 4 UNITS: 100 INJECTION, SOLUTION INTRAVENOUS; SUBCUTANEOUS at 17:17

## 2022-06-15 RX ADMIN — ASPIRIN 81 MG: 81 TABLET, COATED ORAL at 08:30

## 2022-06-15 RX ADMIN — VALSARTAN 80 MG: 80 TABLET, FILM COATED ORAL at 08:29

## 2022-06-15 RX ADMIN — INSULIN LISPRO 4 UNITS: 100 INJECTION, SOLUTION INTRAVENOUS; SUBCUTANEOUS at 11:42

## 2022-06-15 RX ADMIN — METHOCARBAMOL 500 MG: 500 TABLET ORAL at 08:29

## 2022-06-15 RX ADMIN — TROSPIUM CHLORIDE 20 MG: 20 TABLET, FILM COATED ORAL at 15:30

## 2022-06-15 RX ADMIN — SERTRALINE 100 MG: 50 TABLET, FILM COATED ORAL at 08:29

## 2022-06-15 RX ADMIN — PANTOPRAZOLE SODIUM 40 MG: 40 TABLET, DELAYED RELEASE ORAL at 06:01

## 2022-06-15 RX ADMIN — INSULIN LISPRO 3 UNITS: 100 INJECTION, SOLUTION INTRAVENOUS; SUBCUTANEOUS at 20:46

## 2022-06-15 RX ADMIN — AMLODIPINE BESYLATE 10 MG: 5 TABLET ORAL at 08:29

## 2022-06-15 RX ADMIN — ENOXAPARIN SODIUM 40 MG: 100 INJECTION SUBCUTANEOUS at 08:27

## 2022-06-15 RX ADMIN — GABAPENTIN 300 MG: 300 CAPSULE ORAL at 08:28

## 2022-06-15 RX ADMIN — ATORVASTATIN CALCIUM 40 MG: 40 TABLET, FILM COATED ORAL at 20:44

## 2022-06-15 RX ADMIN — GABAPENTIN 300 MG: 300 CAPSULE ORAL at 20:44

## 2022-06-15 RX ADMIN — METHOCARBAMOL 500 MG: 500 TABLET ORAL at 20:45

## 2022-06-15 RX ADMIN — METHOCARBAMOL 500 MG: 500 TABLET ORAL at 15:30

## 2022-06-15 RX ADMIN — METHOCARBAMOL 500 MG: 500 TABLET ORAL at 17:16

## 2022-06-15 RX ADMIN — TROSPIUM CHLORIDE 20 MG: 20 TABLET, FILM COATED ORAL at 06:01

## 2022-06-15 RX ADMIN — TRAZODONE HYDROCHLORIDE 50 MG: 50 TABLET ORAL at 20:45

## 2022-06-15 RX ADMIN — POLYETHYLENE GLYCOL 3350 17 G: 17 POWDER, FOR SOLUTION ORAL at 08:27

## 2022-06-15 RX ADMIN — INSULIN GLARGINE 22 UNITS: 100 INJECTION, SOLUTION SUBCUTANEOUS at 20:46

## 2022-06-15 RX ADMIN — CYANOCOBALAMIN TAB 500 MCG 1000 MCG: 500 TAB at 08:30

## 2022-06-15 RX ADMIN — MICONAZOLE NITRATE: 2 POWDER TOPICAL at 08:26

## 2022-06-15 RX ADMIN — DOCUSATE SODIUM 50 MG AND SENNOSIDES 8.6 MG 2 TABLET: 8.6; 5 TABLET, FILM COATED ORAL at 08:28

## 2022-06-15 ASSESSMENT — PAIN SCALES - WONG BAKER
WONGBAKER_NUMERICALRESPONSE: 0

## 2022-06-15 ASSESSMENT — PAIN SCALES - GENERAL
PAINLEVEL_OUTOF10: 4
PAINLEVEL_OUTOF10: 0

## 2022-06-15 NOTE — PROGRESS NOTES
Speech Language Pathology  [facility]: ACUTE REHAB UNIT  SPEECH-LANGUAGE PATHOLOGY      [x] Daily  [] Weekly Care Conference Note  [] Discharge    Patient:Saravanan Hassan      KWU:[birth year]  [facility]:[id number]  Rehab Dx/Hx: S/p left hip fracture [Z87.81]    Precautions: falls  Home situation: pt lives with wife, active , indep with meds, wife manages finances and other household tasks   ST Dx: [] Aphasia  [] Dysarthria  [] Apraxia   [] Oropharyngeal dysphagia [] Cognitive Impairment  [] Other:   Date of Admit: 6/3/2022  Room #: 0151/0151-01    Current functional status (updated daily):         Pt being seen for : [] Speech/Language Treatment  [] Dysphagia Treatment [x] Cognitive Treatment  [] Other:  Communication: [x]WFL  [] Aphasia  [] Dysarthria  [] Apraxia  [] Pragmatic Impairment [] Non-verbal  [] Hearing Loss  [] Other:   Cognition: [] WFL  [x] Mild  [x] Moderate  [] Severe [] Unable to Assess  [] Other:  Memory: [] WFL  [x] Mild  [x] Moderate  [] Severe [] Unable to Assess  [] Other:  Behavior: [x] Alert  [x] Cooperative  [x]  Pleasant  [] Confused  [] Agitated  [] Uncooperative  [] Distractible [] Motivated  [] Self-Limiting [] Anxious  [] Other:  Endurance:  [x] Adequate for participation in SLP sessions  [] Reduced overall  [] Lethargic  [] Other:  Safety: [] No concerns at this time  [x] Reduced insight into deficits  [x]  Reduced safety awareness [] Not following call light procedures  [] Unable to Assess  [] Other:    Current Diet Order:ADULT DIET;  Regular; 4 carb choices (60 gm/meal)  Swallowing Precautions: upright for all intake, stay upright for at least 30 mins after intake, small bites/sips, alternate bites/sips and slow rate of intake        Date: 6/15/2022      Tx session 1  [id number] Tx session 2  All tx needs met in session 1    Total Timed Code Min 60    Total Treatment Minutes 60    Individual Treatment Minutes 60    Group Treatment Minutes 0    Co-Treat Minutes 0    Variance/Reason:  N/A Pain Pt stated pain in hip this date. Pt repositioned to legs up in recling chair per pt request. DR notifed when doing rounds in pt's room during SLP session. pain this date. Pt said it is tolerable. Pain Intervention [] RN notified  [x] Repositioned  [] Intervention offered and patient declined  [x] N/A  [x] Other: Pt told Dr when Dr was in room during SLP session. [] RN notified  [] Repositioned  [] Intervention offered and patient declined  [] N/A  [] Other:   Subjective     Pt alert and oriented, cooperative and agreeable to participate in therapy. Pt seen getting into bathroom at beginning of tx (helped by PCA). Pt helped back to bed with x1 assist and walker from SLP, per walking restrictions. Objective:  Goals     Dysphagia Goals:  Short-term Goals  Timeframe for Short-term Goals: 14 days (6/18/22)     Goal 1. The patient will tolerate recommended diet without observed clinical signs of aspiration. Goal met 06/09/22. Pt tolerating IDDSI Level 7 regular solids with thin liquids, meds whole with PO as LRD. Pt denied trouble eating morning meal this date. Goal 2. The patient/caregiver will demonstrate understanding of compensatory strategies for improved swallowing safety. Goal met 06/09/22. Pt tolerating IDDSI Level 7 regular solids with thin liquids, meds whole with PO as LRD. Speech/Lang/Cog Goals:  Short-term Goals  Timeframe for Short-term Goals: 14 days (6/18/22)    Goal 1: Pt will complete graded recall tasks using compensatory strategies with 80% acc given mod cues. Pt completed multiple recall tasks through various tasks this date. - Pt completed lesley/visual daily memory task: immediate recall: 100%, % with no cues. - Pt completed picture remembering task this date. Immediate: 92% with no cues. Pt did not improve with verbal cue. STM: 75% acc indep, improved to 83% with min phonetic cue.           Goal 2: Pt will complete executive function tasks (meds, money, math, time, etc) with 80% acc given mod cues. Pt completed executive functioning task this date. Pt asked to calculate time for daily word problems. EX: If the flight leaves at 7:00am, and it takes you 40 minutes to get ready, when should you set your alarm?   - Pt 70% acc indep. Pt improved to 80% acc with min cues. Pt completed money counting task (pictures of coins) with 100% acc indep this date. Goal 3: Pt will complete verbal and visual reasoning tasks with 80% acc given mod cues   Pt worked on deduction puzzle this date. Pt stated that \"this is hard\" and was cued to cross out items/use compensatory strategies as necessary. Pt to continue this task in tomorrow's SLP session. Pt unable to complete this date due to time restrictions. Goal 4: Pt will complete problem solving and thought organization tasks with 80% acc given mod cues   Pt completed thought organization/word list retention task this date (bear, coat, tiger, lion- what was the third word?)  - Pt able to achieve 80% acc indep while answering questions. Goal 5: Pt will complete graded attention tasks (e.g. sustained, selective, alternative, divided) with 80% acc given min cues   Pt completed all tasks with attention that was [facility]. See goals above. No cues needed for attention. Other areas targeted: Coughing due to baseline cough during tx this date.  notified of cough during SLP session,  followed up with pt during session.  said she would continue to monitor, but chest XRAY looked clear. Education:   Pt and SLP discussed Life Alert from OT session this morning. Pt verbalized understanding and reason for having the life alert after leaving [facility]. SLP edu pt on reason for life alert and other safety supports to reduce fall risk. Pt verbalized understanding.       Safety Devices: [x] Call light within reach  [x] Chair alarm activated  [] Bed alarm activated  [] Other: [] Call light within reach  [] Chair alarm activated  [] Bed alarm activated  [] Other:    Assessment: Pt seen for cog tx this date. Pt was alert, oriented and willing to participate in therapy/ Pt seen initially in bathroom, after being helped by a PCA. SLP stated she would assist pt on way back to chair. SLP assisted pt back to chair with x1 assist with walker, per PT instructions. Pt participated in recall tasks with varying accuracy: 100%, 100% for immediate and STM for 1 task; and 92% and 75% accuracy on other task, indep. Pt executive functioning tasks, completed with 70% accuracy indep for time tasks. Pt completed money counting task with 100% acc indep this date. Pt's dr came in during visit to check on pt's recent cough and to state that his chest XRAY looked clear. Dr stated that she would continue to monitor this cough. Pt stated verbal understanding. Pt stated his daily goal was to \" ease up on this pain\", concerning his hip pain. Continue goals above. Plan: Continue as per plan of care. Additional Information:     Barriers toward progress: Confusion and Cognitive deficit  Discharge recommendations:  [] Home independently  [] Home with assistance [x]  24 hour supervision  [] ECF [] Other:  Continued Tx Upon Discharge: ? [x] Yes [] No [] TBD based on progress while on ARU [] Vital Stim indicated [] Other:   Estimated discharge date: 06/18/22    Interventions used this date:  [] Speech/Language Treatment  [] Instruction in HEP [] Group [] Dysphagia Treatment [x] Cognitive Treatment   [] Other: Total Time Breakdown / Charges    Time in Time out Total Time / units   Cognitive Tx 1000 1100 60min/ 4 units    Speech Tx -- -- --   Dysphagia Tx -- -- --       Electronically Signed by     Blayne FOREMAN  [id number] [facility]  Speech Language Pathologist  [phone]

## 2022-06-15 NOTE — PROGRESS NOTES
Physical Therapy  Facility/Department: [facility]  Rehabilitation Physical Therapy Treatment Note    NAME: Chapis Craig  : 1945 (78 y.o.)  MRN: [id number]  CODE STATUS: Full Code    Date of Service: 6/15/22       Restrictions:  Restrictions/Precautions: Weight Bearing; Fall Risk;General Precautions  Lower Extremity Weight Bearing Restrictions  Right Lower Extremity Weight Bearing: Weight Bearing As Tolerated  Left Lower Extremity Weight Bearing: Weight Bearing As Tolerated  Position Activity Restriction  Other position/activity restrictions: balance concerns     SUBJECTIVE  Subjective  Subjective: pt found in recliner. Pain: pt reports 7/10 pain in L hip. States feeling very sore following this mornings therapy          OBJECTIVE  Cognition  Overall Cognitive Status: Exceptions  Arousal/Alertness: Appropriate responses to stimuli  Following Commands: Follows one step commands with repetition; Follows one step commands with increased time  Attention Span: Attends with cues to redirect  Memory: Decreased recall of recent events;Decreased short term memory;Decreased recall of precautions  Safety Judgement: Decreased awareness of need for assistance  Problem Solving: Assistance required to identify errors made;Assistance required to generate solutions;Assistance required to implement solutions  Insights: Decreased awareness of deficits  Initiation: Requires cues for some  Sequencing: Requires cues for some  Orientation  Overall Orientation Status: Within Functional Limits  Orientation Level: Oriented to person;Oriented to situation;Oriented to time;Oriented to place    Functional Mobility     Pt found in recliner. Vitals assessed. STS from recliner to RW CGA  Pt sits EOB and completes all bed mobility with Supv. STS from EOB to RW with SBA  Gait x60' to gym with RW and CGA .  PT cues to pt on upright posture and increased step length on R.     x8' on SCIFIT bike in order to address stiffness in LLE and deficits in endurance and cardiorespiratory function. STS from [address] [place] [address] with CGA  X185'+ 60' gait training with RW and CGA. Pt shows improvements in weight bearing tolerance today. Pt requires 3 standing rest breaks throughout 185' gait bout but is able to maintain standing with SBA. PT cues frequently for improved gait mechanics and posture. In // bars pt completes x10 step up to 6\" box step and over with a reciprocal pattern using BHR and CGA. Pt alternates which LE he steps up and over with. PT cues pt to stand up tall when stepping onto box. Pt demonstrates increased difficulty when stepping up with L LE. STS from [facility] in // bars CGA  Dyn Standing activity: Tandem standing--> catch and toss ball to aide. 0-2 UE support and CGA. Brendolyn Guevara thrown in various directions and heights in order to facilitate weight shift and address dynamic balance deficits. Pt cued to not utilize UE support. Pt completed for 1 min 30 seconds each leg. Stepping and reaching reactions present. Multiple posterior LOB, more difficult to catch self with RLE leading     Seated There ex w/2lb ankle weights. Marches and knee extensions with cues to slow down and focus on large controlled movements. 1x15 each. Self-propel WC x50' to room supv. Pt requires cuing for steering and continues to have difficulty maneuvering chair. Pt tends to move slowly when propelling WC. Stand pivot from [facility] to recliner with RW and CGA  Pt left with call alarm and other needs within reach. ASSESSMENT/PROGRESS TOWARDS GOALS  Vital Signs  Heart Rate: 76  Heart Rate Source: Monitor  BP: 106/66  BP Location: Left upper arm  MAP (Calculated): 79.33  SpO2: 95 %  O2 Device: None (Room air)    Assessment  Assessment: pt continues to report stiffness and pain in L hip and thigh. He states that this is increased currently d/t working hard this morning in therapy.  pt continue to demonstrate antalgic gait with reduced step length/height when stepping with R LE. pt is limited by tolerance to weight bearing and endurance. Pt is grossly Supv with bed mobility and SBA-CGA with transfers and gait. pt states that he is DC to SNF per family desire. DME: RW  Activity Tolerance: Patient limited by fatigue;Patient limited by endurance; Patient limited by pain; Patient tolerated treatment well  Discharge Recommendations: 24 hour supervision or assist;Home with Home health PT  PT Equipment Recommendations  Equipment Needed: Yes  Wheelchair: Standard  Other: CTA, pt may require manual w/c with cushion, pending progression of mobility and gait, pt reports he owns RW, 5KH and electric scooter for community    Goals  Patient Goals   Patient goals : \"Get stronger so I can get outside and be able to do things I on my own\"  Short Term Goals  Time Frame for Short term goals: 10 days 6/12/22  Short term goal 1: Pt will complete supine to/from sit with Cristobal- GOAL NOT MET  Short term goal 2: Pt will complete sit to/from stand with RW with Cristobal- GOAL MET  Short term goal 3: Pt will complete bed <> chair with RW with modA- GOAL MET  Short term goal 4: Pt will ambulate x 15' with RW with modA without LOB- GOAL MET  Long Term Goals  Time Frame for Long term goals : 21 days 6/23/22  Long term goal 1: Pt will complete supine to/from sit with SBA  Long term goal 2: Pt will complete functional t/f with RW with CGA  Long term goal 3: Pt will ambulate x 48' with RW with CGA without LOB  Long term goal 4: Pt will complete car t/f with RW and CGA    PLAN OF CARE/SAFETY  Plan  Plan: 5-7 times per week  Specific Instructions for Next Treatment: Progress mobility as tolerated  Current Treatment Recommendations: Strengthening; Wheelchair mobility training;Equipment evaluation, education, & procurement;Balance training;Gait training;Pain management; Modalities;Stair training;Functional mobility training;Transfer training;Neuromuscular re-education;Home exercise program;Positioning; Safety education & training;Manual Therapy - Soft Tissue Mobilization;Patient/Caregiver education & training; Therapeutic activities; Endurance training;Manual Therapy - Joint Manipulation;Cognitive reorientation  Safety Devices  Type of Devices: All fall risk precautions in place;Call light within reach; Patient at risk for falls;Gait belt;Nurse notified; Left in chair;Chair alarm in place  Restraints  Restraints Initially in Place: No    EDUCATION  Education  Education Given To: Patient  Education Provided: Role of Therapy;Plan of Care;Precautions; Safety; Fall Prevention Strategies;DME/Home Modifications;Transfer Training;Equipment;ADL Function;Cognition;Mobility Training  Education Provided Comments: pt educated on importance of OOB mobility, need for safety with fatigue and buckling, safe use of hand placement and RW - requires reinforcement  Education Method: Demonstration;Verbal  Barriers to Learning: Cognition; Hearing  Education Outcome: Continued education needed;Verbalized understanding; Unable to demonstrate understanding        Therapy Time   Individual Concurrent Group Co-treatment   Time In 1230         Time Out 1330         Minutes 60           Timed Code Treatment Minutes: [id number] [facility], Pawel Mukherjee, 06/15/22 at 1:42 PM

## 2022-06-15 NOTE — PROGRESS NOTES
Occupational Therapy  Facility/Department: [facility]  Rehabilitation Occupational Therapy Daily Treatment Note    Date: 6/15/22  Patient Name: Maria Esther Martines       Room: 1885/1043-01  MRN: [id number]  Account: [de-identified]   : 1945  (71 y.o.) Gender: male                    Past Medical History:  has a past medical history of Diabetes mellitus ([address] and Hypertension. Past Surgical History:   has no past surgical history on file. Restrictions  Restrictions/Precautions: Weight Bearing; Fall Risk;General Precautions  Other position/activity restrictions: balance concerns  Right Lower Extremity Weight Bearing: Weight Bearing As Tolerated  Left Lower Extremity Weight Bearing: Weight Bearing As Tolerated    Subjective  Subjective: Pt in recliner, states L rib pain at 5/10 this date, agreeable to OT session, /73, HR 76, O2 sats 98%. Restrictions/Precautions: Weight Bearing; Fall Risk;General Precautions             Objective     Cognition  Overall Cognitive Status: Exceptions  Arousal/Alertness: Appropriate responses to stimuli  Following Commands: Follows one step commands with repetition; Follows one step commands with increased time  Attention Span: Attends with cues to redirect  Memory: Decreased recall of recent events;Decreased short term memory;Decreased recall of precautions  Safety Judgement: Decreased awareness of need for assistance  Problem Solving: Assistance required to identify errors made;Assistance required to generate solutions;Assistance required to implement solutions  Insights: Decreased awareness of deficits  Initiation: Requires cues for some  Sequencing: Requires cues for some  Orientation  Overall Orientation Status: Within Functional Limits   Perception  Overall Perceptual Status: Impaired  Unilateral Attention: Cues to maintain midline in standing;Cues to maintain midline in sitting  Initiation: Cues to initiate tasks  Motor Planning: Appears intact  Perseveration: Perseverates during conversation     ADL  Putting On/Taking Off Footwear  Assistance Level: Minimal assistance  Skilled Clinical Factors: to don shoes fully in back          Functional Mobility  Device: Rolling walker  Activity: To/From therapy gym  Assistance Level: Contact guard assist  Transfers  Surface: From chair with arms; Wheelchair  Additional Factors: Verbal cues; Hand placement cues; With handrails  Device: Walker  Sit to Stand  Assistance Level: Minimal assistance  Stand to Sit  Assistance Level: Contact guard assist  Bed To/From Chair  Technique: Stand step  Assistance Level: Contact guard assist  Skilled Clinical Factors: to W/C  Floor Transfer  Assistance Level: Dependent  Skilled Clinical Factors: max A of 2 to complete floor transfer to w/c from mat table   OT Exercises  Resistive Exercises: 3# weight x15 for wrist flexion, wrist extension, supination/pronation, shoulder flexion, shoulder horizontal abduction, chest press, elbow flexion     Assessment  Assessment  Assessment: Pt agreeable to OT session. Pt completed mobility to/from gym with CGA/SBA with RW and mild unsteadiness but no LOB during mobility. Pt completed BUE ther ex with good strength for 3# weight. Pt performed standing task to toss large exercise ball on wall while standing on ground with CGA/min A but when standing on balance board unable to stand without min A and losing balance backward requiring max A for majority of task. Pt demonstrated good coordination for tossing/catching ball with 90% accuracy. Pt attempted floor transfer with inability to get knees or arms underneath him to support without assistance and required max A to get to hands/knees and max A of 2 to transfer into sitting position in chair. Continue POC. Activity Tolerance: Patient limited by fatigue;Patient limited by endurance; Patient limited by pain; Patient tolerated treatment well  Discharge Recommendations: [address]   Equipment Recommendations  Equipment Needed: Yes  Mobility Devices: ADL Assistive Devices  ADL Assistive Devices: Toileting - 3-in-1 Commode  Safety Devices  Safety Devices in place: Yes  Type of devices: All fall risk precautions in place;Gait belt;Patient at risk for falls;Call light within reach;Nurse notified; Left in chair;Chair alarm in place    Patient Education  Education  Education Given To: Patient  Education Provided: Role of Therapy; ADL Function;Plan of Care;Transfer Training; Safety; Fall Prevention Strategies; Equipment; Mobility Training;Precautions;DME/Home Modifications;Cognition; Energy Conservation  Education Provided Comments: balance training, reaching outside STEVEN, dynamic balance, d/c planning, use of a/e, safety with ADLs, fall recovery  Education Method: Verbal;Demonstration  Barriers to Learning: Cognition  Education Outcome: Verbalized understanding;Demonstrated understanding;Continued education needed    Plan  Plan  Times per Week: 5/7 days/week  Plan Weeks: 3 weeks  Current Treatment Recommendations: Strengthening;ROM;Balance training;Functional mobility training; Wheelchair mobility training; Safety education & training;Neuromuscular re-education;Cognitive reorientation; Endurance training;Pain management;Self-Care / ADL; Home management training;Cognitive/Perceptual training;Coordination training;Equipment evaluation, education, & procurement;Patient/Caregiver education & training    Goals  Short Term Goals  Time Frame for Short term goals: 1 week- 6/10/22  Short Term Goal 1: Pt will perform functional transfers with max A. GOAL MET 6/9/22 Pt performed functional transfers with min A. Short Term Goal 2: Pt will complete toileting with max A. GOAL MET 6/10/22 Pt completed toileting with max A with assist of nursing per patient report. Short Term Goal 3: Pt will perform LB dressing with mod A. GOAL MET 6/9/22 Pt performed LB dressing with min A. Short Term Goal 4: Pt will complete bathing with mod A.  GOAL MET 6/9/22 Pt completed bathing with CGA. Long Term Goals  Time Frame for Long term goals : 3 weeks- 6/23/22  Long Term Goal 1: Pt will complete functional transfers with CGA. -- GOAL MET 6/13  Long Term Goal 2: Pt will perform bathing with CGA. GOAL MET 6/14/22 Pt performed bathing with CGA. Long Term Goal 3: Pt will complete full body dressing with CGA. Long Term Goal 4: Pt will perform toileting with CGA.   Long Term Goal 5: Pt will perform grooming at sink with mod I.-- GOAL MET 6/13    Therapy Time   Individual Concurrent Group Co-treatment   Time In 0900         Time Out 1000         Minutes 60         Timed Code Treatment Minutes: 16 Tari Hernandez OT

## 2022-06-15 NOTE — PROGRESS NOTES
Sherri Miranda  6/15/2022  [id number]    Chief Complaint: S/p left hip fracture    Subjective:   No acute events overnight. Today Layne Chin is seen in his room working with Speech. He reports chronic dry cough. He denies other acute complaints at this time. ROS: denies f/c, n/v, cp, sob    Objective:  Patient Vitals for the past 24 hrs:   BP Temp Temp src Pulse Resp SpO2   06/15/22 1248 106/66 -- -- 76 -- 95 %   06/15/22 0815 118/66 97.5 °F (36.4 °C) Oral 77 16 93 %   06/14/22 2348 -- -- -- -- 16 --   06/14/22 1919 (!) 158/67 97.8 °F (36.6 °C) Oral 68 16 --   06/14/22 1845 (!) 158/67 97.8 °F (36.6 °C) Oral 68 16 --   06/14/22 1501 117/76 -- -- 76 -- 96 %   06/14/22 1407 -- -- -- -- 16 --     Gen: No distress, pleasant. Seated in chair  HEENT: Normocephalic, atraumatic. CV: RRR  Resp: No respiratory distress. Lungs CTAB  Abd: Soft, nontender   Ext: No edema. Neuro: Alert, oriented, appropriately interactive.  Speech fluent, delayed processing    Wt Readings from Last 3 Encounters:   06/12/22 207 lb 14.4 oz (94.3 kg)       Laboratory data:   Lab Results   Component Value Date    WBC 4.4 06/13/2022    HGB 10.3 (L) 06/13/2022    HCT 32.0 (L) 06/13/2022    MCV 79.4 (L) 06/13/2022     06/13/2022       Lab Results   Component Value Date     06/13/2022    K 4.5 06/13/2022     06/13/2022    CO2 28 06/13/2022    BUN 21 06/13/2022    CREATININE 0.9 06/13/2022    GLUCOSE 206 06/13/2022    CALCIUM 8.4 06/13/2022        Therapy progress:  PT  Position Activity Restriction  Other position/activity restrictions: balance concerns  Objective     Sit to Stand: Minimal Assistance  Stand to sit: Minimal Assistance  Bed to Chair: Dependent/Total  Device: Rolling Walker  Assistance: Contact guard assistance,Moderate assistance  Distance: 47 ft, 18 ft ( several 180* turns)  OT  PT Equipment Recommendations  Equipment Needed: Yes  Mobility Devices: Wheelchair  Wheelchair: Standard  Other: CTA, pt may require manual w/c with cushion, pending progression of mobility and gait, pt reports he owns RW, 7QK and electric scooter for community  Equipment Used: Standard toilet  Toilet Transfers Comments: use of [place]  Assessment        SLP    Pt was seen for cognitive tx this date. Pt seen alert and oriented within chair. Pt completed multiple cognitive tasks: abstract organization, concrete organization, verbal reasoning, executive functioning (time), and recall. Pt complete recall task with 91% acc indep. Pt completed concrete organizational/reasoning task with 100% acc indep. Abstract reasoning task completed with 50% acc indep, improving to 82% acc with min cues. Executive functioning (daily time task) completed with 81% accuracy. Dr came in during visit, pt told dr that he will be in charge of giving own insulin at home after d/c. Pt stated that grandson will be driving him mostly. Pt observed with several regular solids PO trials and TL via cup, tolerating without any difficulty or s/s of aspiration- all dysphagia goals met this date. Continue goals above. Body mass index is 31.61 kg/m². Assessment and Plan:  Veda Hook is a 78year old male with a past medical history significant for DM2, HTN, HLD, BERNARDO, COPD, prostate cancer s/p prostatectomy (2019), and GERD who fell down 2 steps and was found to have left hip and femur fracture on 5/6/22 s/p surgery. He was admitted to [facility] on 6/3/22 due to functional deficits below his baseline.     Left Hip fracture  - s/p repair in [place]  - WBAT  - PT and OT     Post-operative Pain  - tylenol PRN, oxycodone 2.5 mg PRN  - gabapentin 300 mg BID  - robaxin scheduled    Dry Cough  - possibly due to valsartan as patient previously had side effect of cough with lisinopril  - will discontinue valsartan and monitor symptoms    DM2  - Lantus 22 units nightly  - SSI medium dose correction  - has metformin listed as allergy  - reports he was only taking Lantus at home.    - plan to discharge on SSI now that he is discharging to SNF     COPD  - adequately oxygenating on room   - wean oxygen for SpO2>90%     BERNARDO  - CPAP qhs  - respiratory consulted    History of HTN  - home regimen: amlodipine 10 mg, losartan 100 mg  - discontinue valsartan due to concerns that this may be causing cough since patient has cough listed as a side effect of lisinopril  - change amlodipine to nifedipine to be able to up titrate     HLD  - statin     History of Prostate Cancer  - s/p prostatectomy in 2019  - was recently told that his cancer is back and tentative plan to resume chemo  - oxybutynin discontinued due to concerns this was contributing to confusion  - follow up outpatient     Obesity  - BMI 41   - encourage lifestyle modifications     Bowels: Schedule stool softener. Follow bowel movements. Enema or suppository if needed.      Bladder: Check PVR x 3. [address] if PVR > 200ml or if any volume is > 500 ml.      Sleep: Trazodone provided prn.      Follow up appointments: PCP    Services/DME: defer to SNF  EDOD: 6/18/22    Interdisciplinary team conference was held today with entire rehab treatment team including PT, OT, SLP, Dietician, RN, and SW. Discussion focused on progress toward rehab goals and discharge planning. Barriers: pain, poor carryover, availability of assistance at home. Total treatment time >35 min with greater than 50% spent in care coordination.       [facility].  Orly Carrasco MD 6/15/2022, 1:49 PM

## 2022-06-15 NOTE — PROGRESS NOTES
Nutrition Assessment     Type and Reason for Visit: Reassess    Nutrition Recommendations/Plan:   1. Continue carb control diet   2. Monitor nutrition adequacy, pertinent labs, bowel habits, wt changes, and clinical progress     Malnutrition Assessment:  Malnutrition Status: No malnutrition    Nutrition Assessment:  Follow up: Pt nutritionally stable AEB good appetite and PO intakes of %. Encouraged continued good PO intakes. Pt c/o kitchen not bringing sugar on meal trays, discussion on carb control diet and no added sugars. Declined further diet education. Will continue to monitor. Estimated Daily Nutrient Needs:  Energy (kcal):  5585-8252 kcal Weight Used for Energy Requirements: Ideal     Protein (g):  70-84 g Weight Used for Protein Requirements: Ideal        Fluid (ml/day):  6482-6570 mL Method Used for Fluid Requirements: 1 ml/kcal    Nutrition Related Findings:   BM x2 on 6/8. Active BS. Labs reviewed. -277 past 24 hrs. Wound Type: Surgical Incision    Current Nutrition Therapies:    ADULT DIET;  Regular; 4 carb choices (60 gm/meal)    Anthropometric Measures:  · Height: 5' 8\" (172.7 cm)  · Current Body Wt: 207 lb (93.9 kg)   · BMI: 31.5    Nutrition Diagnosis:   No nutrition diagnosis at this time     Nutrition Interventions:   Food and/or Nutrient Delivery: Continue Current Diet  Nutrition Education/Counseling: Education declined (handouts left at bedside)  Coordination of Nutrition Care: Continue to monitor while inpatient,Interdisciplinary Rounds  Plan of Care discussed with: Patient    Goals:  Previous Goal Met: Progressing toward Goal(s)  Goals: PO intake 50% or greater,prior to discharge       Nutrition Monitoring and Evaluation:   Behavioral-Environmental Outcomes: Readiness for Change,Beliefs and Attitutes  Food/Nutrient Intake Outcomes: Food and Nutrient Intake  Physical Signs/Symptoms Outcomes: Biochemical Data,Nutrition Focused Physical Findings,Weight    Discharge Planning: Continue current diet     George Fitzpatrick MS, RD, LD  Contact: [phone]

## 2022-06-16 LAB
ANION GAP SERPL CALCULATED.3IONS-SCNC: 6 MMOL/L (ref 3–16)
BASOPHILS ABSOLUTE: 0 K/UL (ref 0–0.2)
BASOPHILS RELATIVE PERCENT: 0.5 %
BUN BLDV-MCNC: 15 MG/DL (ref 7–20)
CALCIUM SERPL-MCNC: 9 MG/DL (ref 8.3–10.6)
CHLORIDE BLD-SCNC: 103 MMOL/L (ref 99–110)
CO2: 29 MMOL/L (ref 21–32)
CREAT SERPL-MCNC: 0.8 MG/DL (ref 0.8–1.3)
EOSINOPHILS ABSOLUTE: 0.3 K/UL (ref 0–0.6)
EOSINOPHILS RELATIVE PERCENT: 6.4 %
GFR AFRICAN AMERICAN: >60
GFR NON-AFRICAN AMERICAN: >60
GLUCOSE BLD-MCNC: 137 MG/DL (ref 70–99)
GLUCOSE BLD-MCNC: 142 MG/DL (ref 70–99)
GLUCOSE BLD-MCNC: 158 MG/DL (ref 70–99)
GLUCOSE BLD-MCNC: 252 MG/DL (ref 70–99)
GLUCOSE BLD-MCNC: 296 MG/DL (ref 70–99)
HCT VFR BLD CALC: 32.1 % (ref 40.5–52.5)
HEMOGLOBIN: 10.3 G/DL (ref 13.5–17.5)
LYMPHOCYTES ABSOLUTE: 1.7 K/UL (ref 1–5.1)
LYMPHOCYTES RELATIVE PERCENT: 33.7 %
MCH RBC QN AUTO: 24.9 PG (ref 26–34)
MCHC RBC AUTO-ENTMCNC: 32.2 G/DL (ref 31–36)
MCV RBC AUTO: 77.4 FL (ref 80–100)
MONOCYTES ABSOLUTE: 0.4 K/UL (ref 0–1.3)
MONOCYTES RELATIVE PERCENT: 8.4 %
NEUTROPHILS ABSOLUTE: 2.6 K/UL (ref 1.7–7.7)
NEUTROPHILS RELATIVE PERCENT: 51 %
PDW BLD-RTO: 16.8 % (ref 12.4–15.4)
PERFORMED ON: ABNORMAL
PLATELET # BLD: 192 K/UL (ref 135–450)
PMV BLD AUTO: 8 FL (ref 5–10.5)
POTASSIUM REFLEX MAGNESIUM: 4.8 MMOL/L (ref 3.5–5.1)
RBC # BLD: 4.15 M/UL (ref 4.2–5.9)
SODIUM BLD-SCNC: 138 MMOL/L (ref 136–145)
WBC # BLD: 5.2 K/UL (ref 4–11)

## 2022-06-16 PROCEDURE — 97130 THER IVNTJ EA ADDL 15 MIN: CPT

## 2022-06-16 PROCEDURE — 6370000000 HC RX 637 (ALT 250 FOR IP): Performed by: STUDENT IN AN ORGANIZED HEALTH CARE EDUCATION/TRAINING PROGRAM

## 2022-06-16 PROCEDURE — 97110 THERAPEUTIC EXERCISES: CPT

## 2022-06-16 PROCEDURE — 97530 THERAPEUTIC ACTIVITIES: CPT

## 2022-06-16 PROCEDURE — 6360000002 HC RX W HCPCS: Performed by: STUDENT IN AN ORGANIZED HEALTH CARE EDUCATION/TRAINING PROGRAM

## 2022-06-16 PROCEDURE — 85025 COMPLETE CBC W/AUTO DIFF WBC: CPT

## 2022-06-16 PROCEDURE — 36415 COLL VENOUS BLD VENIPUNCTURE: CPT

## 2022-06-16 PROCEDURE — 1280000000 HC REHAB R&B

## 2022-06-16 PROCEDURE — 97116 GAIT TRAINING THERAPY: CPT

## 2022-06-16 PROCEDURE — 97129 THER IVNTJ 1ST 15 MIN: CPT

## 2022-06-16 PROCEDURE — 80048 BASIC METABOLIC PNL TOTAL CA: CPT

## 2022-06-16 RX ADMIN — METHOCARBAMOL 500 MG: 500 TABLET ORAL at 17:33

## 2022-06-16 RX ADMIN — MICONAZOLE NITRATE: 2 POWDER TOPICAL at 22:53

## 2022-06-16 RX ADMIN — INSULIN LISPRO 6 UNITS: 100 INJECTION, SOLUTION INTRAVENOUS; SUBCUTANEOUS at 12:09

## 2022-06-16 RX ADMIN — TRAZODONE HYDROCHLORIDE 50 MG: 50 TABLET ORAL at 22:52

## 2022-06-16 RX ADMIN — ATORVASTATIN CALCIUM 40 MG: 40 TABLET, FILM COATED ORAL at 22:52

## 2022-06-16 RX ADMIN — INSULIN LISPRO 3 UNITS: 100 INJECTION, SOLUTION INTRAVENOUS; SUBCUTANEOUS at 22:49

## 2022-06-16 RX ADMIN — CYANOCOBALAMIN TAB 500 MCG 1000 MCG: 500 TAB at 09:55

## 2022-06-16 RX ADMIN — METHOCARBAMOL 500 MG: 500 TABLET ORAL at 13:10

## 2022-06-16 RX ADMIN — ACETAMINOPHEN 325MG 650 MG: 325 TABLET ORAL at 22:57

## 2022-06-16 RX ADMIN — ASPIRIN 81 MG: 81 TABLET, COATED ORAL at 09:55

## 2022-06-16 RX ADMIN — POLYETHYLENE GLYCOL 3350 17 G: 17 POWDER, FOR SOLUTION ORAL at 09:52

## 2022-06-16 RX ADMIN — PANTOPRAZOLE SODIUM 40 MG: 40 TABLET, DELAYED RELEASE ORAL at 06:44

## 2022-06-16 RX ADMIN — MICONAZOLE NITRATE: 2 POWDER TOPICAL at 09:58

## 2022-06-16 RX ADMIN — TROSPIUM CHLORIDE 20 MG: 20 TABLET, FILM COATED ORAL at 06:44

## 2022-06-16 RX ADMIN — ENOXAPARIN SODIUM 40 MG: 100 INJECTION SUBCUTANEOUS at 09:54

## 2022-06-16 RX ADMIN — OXYCODONE 2.5 MG: 5 TABLET ORAL at 09:52

## 2022-06-16 RX ADMIN — INSULIN GLARGINE 22 UNITS: 100 INJECTION, SOLUTION SUBCUTANEOUS at 22:50

## 2022-06-16 RX ADMIN — NIFEDIPINE 60 MG: 30 TABLET, FILM COATED, EXTENDED RELEASE ORAL at 09:56

## 2022-06-16 RX ADMIN — DOCUSATE SODIUM 50 MG AND SENNOSIDES 8.6 MG 2 TABLET: 8.6; 5 TABLET, FILM COATED ORAL at 09:56

## 2022-06-16 RX ADMIN — GABAPENTIN 300 MG: 300 CAPSULE ORAL at 09:55

## 2022-06-16 RX ADMIN — INSULIN LISPRO 2 UNITS: 100 INJECTION, SOLUTION INTRAVENOUS; SUBCUTANEOUS at 06:43

## 2022-06-16 RX ADMIN — TROSPIUM CHLORIDE 20 MG: 20 TABLET, FILM COATED ORAL at 17:33

## 2022-06-16 RX ADMIN — GABAPENTIN 300 MG: 300 CAPSULE ORAL at 22:52

## 2022-06-16 RX ADMIN — METHOCARBAMOL 500 MG: 500 TABLET ORAL at 22:52

## 2022-06-16 RX ADMIN — METHOCARBAMOL 500 MG: 500 TABLET ORAL at 09:55

## 2022-06-16 RX ADMIN — SERTRALINE 100 MG: 50 TABLET, FILM COATED ORAL at 09:56

## 2022-06-16 ASSESSMENT — PAIN SCALES - GENERAL
PAINLEVEL_OUTOF10: 0
PAINLEVEL_OUTOF10: 0
PAINLEVEL_OUTOF10: 6
PAINLEVEL_OUTOF10: 0
PAINLEVEL_OUTOF10: 5
PAINLEVEL_OUTOF10: 6

## 2022-06-16 ASSESSMENT — PAIN DESCRIPTION - LOCATION: LOCATION: RIB CAGE

## 2022-06-16 NOTE — PROGRESS NOTES
Occupational Therapy  Facility/Department: [facility]  Rehabilitation Occupational Therapy Daily Treatment Note    Date: 22  Patient Name: Silas Smyth       Room: [facility]/1193-  MRN: [id number]  Account: [de-identified]   : 1945  (71 y.o.) Gender: male                    Past Medical History:  has a past medical history of Diabetes mellitus ([address] and Hypertension. Past Surgical History:   has no past surgical history on file. Restrictions  Restrictions/Precautions: Weight Bearing; Fall Risk;General Precautions  Other position/activity restrictions: balance concerns  Right Lower Extremity Weight Bearing: Weight Bearing As Tolerated  Left Lower Extremity Weight Bearing: Weight Bearing As Tolerated    Subjective  Subjective: Pt in recliner, states 6/10 pain in L hip, pleasant, agreeable to OT session, states feeling 'off' today, /59, HR 67, O2 sats 97%. Restrictions/Precautions: Weight Bearing; Fall Risk;General Precautions             Objective     Cognition  Overall Cognitive Status: Exceptions  Arousal/Alertness: Appropriate responses to stimuli  Following Commands: Follows one step commands with repetition; Follows one step commands with increased time  Attention Span: Attends with cues to redirect  Memory: Decreased recall of recent events;Decreased short term memory;Decreased recall of precautions  Safety Judgement: Decreased awareness of need for assistance  Problem Solving: Assistance required to identify errors made;Assistance required to generate solutions;Assistance required to implement solutions  Insights: Decreased awareness of deficits  Initiation: Requires cues for some  Sequencing: Requires cues for some  Orientation  Overall Orientation Status: Within Functional Limits   Perception  Overall Perceptual Status: Impaired  Unilateral Attention: Cues to maintain midline in standing;Cues to maintain midline in sitting  Initiation: Cues to initiate tasks  Motor Planning: Appears intact  Perseveration: Perseverates during conversation  MVPT: increased R lateral as pain increased and fatiguing             Functional Mobility  Device: Rolling walker  Activity: To/From therapy gym  Assistance Level: Contact guard assist  Skilled Clinical Factors: SBA to start session, increased lean to L as pt fatigued during session  Transfers  Surface: From chair with arms; Wheelchair; To chair with arms  Additional Factors: Verbal cues; Hand placement cues; With handrails  Sit to Stand  Assistance Level: Minimal assistance  Skilled Clinical Factors: min A initially, improving to SBA  Stand to Sit  Assistance Level: Stand by assist  Bed To/From Chair  Technique: Stand step  Assistance Level: Minimal assistance   OT Exercises  Resistive Exercises: 3# weight x15 for wrist flexion, wrist extension, supination/pronation, shoulder flexion, shoulder horizontal abduction, chest press, elbow flexion  Circulation/Endurance Exercises: sit<>stands x10 with green theraband pulling pt backward  Functional Mobility Circuit Training: reaching to objects to touch with 2# medicine ball for 3x10 circuits for 3 o'clock, 1 o'clock, 11 o'clock, 9 o'clock     Assessment  Assessment  Assessment: Pt agreeable to OT session. Pt performed functional transfers with SBA for majority of session but required min A for first sit>stand at beginning of session. Pt completed dynamic standing and sitting balance tasks with SPV/mod I for sitting balance for majority of task but during seated BUE ther ex on mat table pt began to lean heavily to R and able to catch self and push back to R but kept falling to R. Pt unable to maintain midline posture and required mod A to pivot back to w/c. Pt midline regained while sitting in w/c and maintained in sitting and standing for remainder of session. Pt did not report dizziness during episode.  Pt completed standing for 1 minute x3 for circuits with medicine ball and stood for 3:45 and 3:30 during card game in stance at table top. Due to variability of balance and decreased strength with poor insight/safety pt will require SNF on discharge due to lack of assistance at home. Continue POC. Activity Tolerance: Patient limited by fatigue;Patient limited by endurance; Patient limited by pain; Patient tolerated treatment well  Discharge Recommendations: Subacute/Skilled Nursing Facility  OT Equipment Recommendations  Equipment Needed: Yes  Mobility Devices: ADL Assistive Devices  ADL Assistive Devices: Toileting - 3-in-1 Commode  Safety Devices  Safety Devices in place: Yes  Type of devices: All fall risk precautions in place;Gait belt;Patient at risk for falls;Call light within reach;Nurse notified; Left in chair;Chair alarm in place    Patient Education  Education  Education Given To: Patient  Education Provided: Role of Therapy; ADL Function;Plan of Care;Transfer Training; Safety; Fall Prevention Strategies; Equipment; Mobility Training;Precautions;DME/Home Modifications;Cognition; Energy Conservation  Education Provided Comments: balance training, reaching outside STEVEN, dynamic sitting and standing balance, d/c planning, use of a/e, safety with ADLs  Education Method: Verbal;Demonstration  Barriers to Learning: Cognition  Education Outcome: Verbalized understanding;Demonstrated understanding;Continued education needed    Plan  Plan  Times per Week: 5/7 days/week  Plan Weeks: 3 weeks  Current Treatment Recommendations: Strengthening;ROM;Balance training;Functional mobility training; Wheelchair mobility training; Safety education & training;Neuromuscular re-education;Cognitive reorientation; Endurance training;Pain management;Self-Care / ADL; Home management training;Cognitive/Perceptual training;Coordination training;Equipment evaluation, education, & procurement;Patient/Caregiver education & training    Goals  Short Term Goals  Time Frame for Short term goals: 1 week- 6/10/22  Short Term Goal 1: Pt will perform functional transfers with max A. GOAL MET 6/9/22 Pt performed functional transfers with min A. Short Term Goal 2: Pt will complete toileting with max A. GOAL MET 6/10/22 Pt completed toileting with max A with assist of nursing per patient report. Short Term Goal 3: Pt will perform LB dressing with mod A. GOAL MET 6/9/22 Pt performed LB dressing with min A. Short Term Goal 4: Pt will complete bathing with mod A. GOAL MET 6/9/22 Pt completed bathing with CGA. Long Term Goals  Time Frame for Long term goals : 3 weeks- 6/23/22  Long Term Goal 1: Pt will complete functional transfers with CGA. -- GOAL MET 6/13  Long Term Goal 2: Pt will perform bathing with CGA. GOAL MET 6/14/22 Pt performed bathing with CGA. Long Term Goal 3: Pt will complete full body dressing with CGA. Long Term Goal 4: Pt will perform toileting with CGA.   Long Term Goal 5: Pt will perform grooming at sink with mod I.-- GOAL MET 6/13      Therapy Time   Individual Concurrent Group Co-treatment   Time In 1335         Time Out 1445         Minutes 70         Timed Code Treatment Minutes: 5000 [address]

## 2022-06-16 NOTE — PROGRESS NOTES
Physical Therapy  Facility/Department: [facility]  Rehabilitation Physical Therapy Treatment Note    NAME: Evangelist Simeon  : 1945 (78 y.o.)  MRN: [id number]  CODE STATUS: Full Code    Date of Service: 22       Restrictions:  Restrictions/Precautions: Weight Bearing; Fall Risk;General Precautions  Lower Extremity Weight Bearing Restrictions  Right Lower Extremity Weight Bearing: Weight Bearing As Tolerated  Left Lower Extremity Weight Bearing: Weight Bearing As Tolerated  Position Activity Restriction  Other position/activity restrictions: balance concerns     SUBJECTIVE  Subjective  Subjective: pt found in recliner  Pain: pt reports 6/10 pain L LE. pt describes pain as a soreness. OBJECTIVE  Cognition  Arousal/Alertness: Appropriate responses to stimuli  Following Commands: Follows one step commands with repetition; Follows one step commands with increased time  Attention Span: Attends with cues to redirect  Memory: Decreased recall of recent events;Decreased short term memory;Decreased recall of precautions  Safety Judgement: Decreased awareness of need for assistance  Problem Solving: Assistance required to identify errors made;Assistance required to generate solutions;Assistance required to implement solutions  Insights: Decreased awareness of deficits  Initiation: Requires cues for some  Sequencing: Requires cues for some  Orientation  Overall Orientation Status: Within Functional Limits  Orientation Level: Oriented to person;Oriented to situation;Oriented to time;Oriented to place    Functional Mobility     Pt found in recliner and vitals assessed. STS transfer from [facility] to [address] [place] [address] with CGA. Pt requires the use of momentum to assist with successful transfer. x60' gait to SCIFIT in gym with RW CGA. Pt shows reduced step length when stepping with R LE. Freq. Cues provided for upright posture, equal step length, and walker management.      x8' SCIFIT bike with supv with use of BUE and BLE for increased ROM/strenghtening of Les. STS from bike to RW with SBA.    x200'+40' gait training with RW, CGA. Pt requires 3 standing rest breaks throughout bout but pt shows greater awareness of limitations and when rest breaks are warranted. Pt continues to need frequent cues for upright posture and step length. Pt reports fatigue in UEs with longer gait bout today. Dyn standing w/ RW and CGA: pt standign with neutral STEVEN--> step fwd into a tandem position-> cued to turn to table on opposite side with bean bags and  with same side UE-> step back into neural stance--> throw bean bag. Pt completed x10 repetitions on both sides. Pt required heavy cuing initially for proper form and technique. Completed to address step length discrepancy and dynamic balance. Standing with B 3# ankle weights, pt completes reciprocal cone taps with LEs using RW and CGA. Pt cued to move with control and not to knock over cones when tapping the top of them. Pt reports greater difficulty when weight bearing on L. Standing 3-way hip (flex/abd/ext) with 3# ankle weights donned using RW and CGA. PT promoted seated rest break in between each exercise. STS w/ CGA. Pt cued to avoid compensatory trunk lean, increased compensation when completing exercises with L LE. Pt reports increased hip fatigue following exercises. STS from plinth to RW SBA.   x60' gait to room using RW and CGA. Pt fatigues quickly and begins to flex at trunk/hip/knees. Pt cued to stand up tall and bring walker close for support. Pt left in recliner with call button and needs within reach.        ASSESSMENT/PROGRESS TOWARDS GOALS  Vital Signs  Heart Rate: 70  Heart Rate Source: Monitor  BP: (!) 145/65  BP Location: Left upper arm  MAP (Calculated): 91.67  SpO2: 94 %  O2 Device: None (Room air)    Assessment  Assessment: pt again reports soreness and stiffness in his hip. pt states that he likes to begin on SCIFIT to help 'loosen up' his hip. pt demonstrates antalgic gait pattern when ambulating with RW. pt is SBA-CGA with transfers and gait today. pt requires frequent cuing for safety including walker management, upright posture, and gait mechanincs. pt to DC to SNF on 6/18. Activity Tolerance: Patient limited by fatigue;Patient limited by endurance; Patient limited by pain; Patient tolerated treatment well  Discharge Recommendations: 24 hour supervision or assist;Home with Home health PT  PT Equipment Recommendations  Equipment Needed: Yes  Wheelchair: Standard  Other: CTA, pt may require manual w/c with cushion, pending progression of mobility and gait, pt reports he owns RW, 7RX and electric scooter for community    Goals  Patient Goals   Patient goals : \"Get stronger so I can get outside and be able to do things I on my own\"  Short Term Goals  Time Frame for Short term goals: 10 days 6/12/22  Short term goal 1: Pt will complete supine to/from sit with [place]- GOAL NOT MET  Short term goal 2: Pt will complete sit to/from stand with RW with Crist[place]- GOAL MET  Short term goal 3: Pt will complete bed <> chair with RW with modA- GOAL MET  Short term goal 4: Pt will ambulate x 15' with RW with modA without LOB- GOAL MET  Long Term Goals  Time Frame for Long term goals : 21 days 6/23/22  Long term goal 1: Pt will complete supine to/from sit with SBA  Long term goal 2: Pt will complete functional t/f with RW with CGA  Long term goal 3: Pt will ambulate x 48' with RW with CGA without LOB  Long term goal 4: Pt will complete car t/f with RW and CGA    PLAN OF CARE/SAFETY  Plan  Plan: 5-7 times per week  Plan weeks: 3 weeks  Specific Instructions for Next Treatment: Progress mobility as tolerated  Current Treatment Recommendations: Strengthening; Wheelchair mobility training;Equipment evaluation, education, & procurement;Balance training;Gait training;Pain management; Modalities;Stair training;Functional mobility training;Transfer training;Neuromuscular re-education;Home

## 2022-06-16 NOTE — PROGRESS NOTES
[facility] [place]  6/16/2022  [id number]    Chief Complaint: S/p left hip fracture    Subjective:   No acute events overnight. Today Reagan Ziegler is seen in his room working with Speech. He reports continued but stable soreness and dry cough. He is looking forward to discharge on Saturday. ROS: denies f/c, n/v, cp, sob    Objective:  Patient Vitals for the past 24 hrs:   BP Temp Temp src Pulse Resp SpO2   06/16/22 0930 110/71 97.9 °F (36.6 °C) Oral 74 20 97 %   06/16/22 0843 (!) 145/65 -- -- 70 -- 94 %   06/15/22 2030 (!) 153/72 97.7 °F (36.5 °C) Oral 66 16 95 %   06/15/22 1248 106/66 -- -- 76 -- 95 %     Gen: No distress, pleasant. Seated in chair  HEENT: Normocephalic, atraumatic. CV: extremities well perfused  Resp: No respiratory distress. Abd: Soft, nontender   Ext: No edema. Neuro: Alert, oriented, appropriately interactive.  Speech fluent, delayed processing    Wt Readings from Last 3 Encounters:   06/12/22 207 lb 14.4 oz (94.3 kg)       Laboratory data:   Lab Results   Component Value Date    WBC 5.2 06/16/2022    HGB 10.3 (L) 06/16/2022    HCT 32.1 (L) 06/16/2022    MCV 77.4 (L) 06/16/2022     06/16/2022       Lab Results   Component Value Date     06/16/2022    K 4.8 06/16/2022     06/16/2022    CO2 29 06/16/2022    BUN 15 06/16/2022    CREATININE 0.8 06/16/2022    GLUCOSE 158 06/16/2022    CALCIUM 9.0 06/16/2022        Therapy progress:  PT  Position Activity Restriction  Other position/activity restrictions: balance concerns  Objective     Sit to Stand: Minimal Assistance  Stand to sit: Minimal Assistance  Bed to Chair: Dependent/Total  Device: Rolling Walker  Assistance: Contact guard assistance,Moderate assistance  Distance: 47 ft, 18 ft ( several 180* turns)  OT  PT Equipment Recommendations  Equipment Needed: Yes  Mobility Devices: Wheelchair  Wheelchair: Standard  Other: CTA, pt may require manual w/c with cushion, pending progression of mobility and gait, pt reports he owns , [id number] and electric scooter for Zechariah Rivera Used: Standard toilet  Toilet Transfers Comments: use of Erasmo Kumar  Assessment        SLP    Pt was seen for cognitive tx this date. Pt seen alert and oriented within chair. Pt completed multiple cognitive tasks: abstract organization, concrete organization, verbal reasoning, executive functioning (time), and recall. Pt complete recall task with 91% acc indep. Pt completed concrete organizational/reasoning task with 100% acc indep. Abstract reasoning task completed with 50% acc indep, improving to 82% acc with min cues. Executive functioning (daily time task) completed with 81% accuracy. Dr came in during visit, pt told dr that he will be in charge of giving own insulin at home after d/c. Pt stated that grandson will be driving him mostly. Pt observed with several regular solids PO trials and TL via cup, tolerating without any difficulty or s/s of aspiration- all dysphagia goals met this date. Continue goals above. Body mass index is 31.61 kg/m². Assessment and Plan:  Leonel Corey is a 78year old male with a past medical history significant for DM2, HTN, HLD, BERNARDO, COPD, prostate cancer s/p prostatectomy (2019), and GERD who fell down 2 steps and was found to have left hip and femur fracture on 5/6/22 s/p surgery. He was admitted to [facility] on 6/3/22 due to functional deficits below his baseline.     Left Hip fracture  - s/p repair in [place]  - WBAT  - PT and OT     Post-operative Pain  - tylenol PRN, oxycodone 2.5 mg PRN  - gabapentin 300 mg BID  - robaxin scheduled    Dry Cough  - possibly due to valsartan as patient previously had side effect of cough with lisinopril  - discontinued valsartan and monitor symptoms    DM2  - Lantus 22 units nightly  - SSI medium dose correction  - has metformin listed as allergy  - reports he was only taking Lantus at home.    - plan to discharge on SSI now that he is discharging to SNF     COPD  - adequately oxygenating on room   - wean oxygen for SpO2>90%     BERNARDO  - CPAP qhs  - respiratory consulted    History of HTN  - home regimen: amlodipine 10 mg, losartan 100 mg  - discontinued valsartan due to concerns that this may be causing cough since patient has cough listed as a side effect of lisinopril  - change amlodipine to nifedipine to be able to up titrate     HLD  - statin     History of Prostate Cancer  - s/p prostatectomy in 2019  - was recently told that his cancer is back and tentative plan to resume chemo  - oxybutynin discontinued due to concerns this was contributing to confusion  - follow up outpatient     Obesity  - BMI 41   - encourage lifestyle modifications     Bowels: Schedule stool softener. Follow bowel movements. Enema or suppository if needed.      Bladder: Check PVR x 3. [facility] if PVR > 200ml or if any volume is > 500 ml.      Sleep: Trazodone provided prn.      Follow up appointments: PCP    Services/DME: defer to SNF  EDOD: 6/18/22    Rella End.  Lizzy Fuentes MD 6/16/2022, 10:44 AM

## 2022-06-16 NOTE — PROGRESS NOTES
Speech Language Pathology  [facility]: ACUTE REHAB UNIT  SPEECH-LANGUAGE PATHOLOGY      [x] Daily  [] Weekly Care Conference Note  [] Discharge    Patient:Saravanan Frias      IYK:1/0/1821  RPF:[id number]  Rehab Dx/Hx: S/p left hip fracture [Z87.81]    Precautions: falls  Home situation: pt lives with wife, active , indep with meds, wife manages finances and other household tasks   ST Dx: [] Aphasia  [] Dysarthria  [] Apraxia   [] Oropharyngeal dysphagia [] Cognitive Impairment  [] Other:   Date of Admit: 6/3/2022  Room #: 0151/0151-01    Current functional status (updated daily):         Pt being seen for : [] Speech/Language Treatment  [] Dysphagia Treatment [x] Cognitive Treatment  [] Other:  Communication: [x]WFL  [] Aphasia  [] Dysarthria  [] Apraxia  [] Pragmatic Impairment [] Non-verbal  [] Hearing Loss  [] Other:   Cognition: [] WFL  [x] Mild  [x] Moderate  [] Severe [] Unable to Assess  [] Other:  Memory: [] WFL  [x] Mild  [x] Moderate  [] Severe [] Unable to Assess  [] Other:  Behavior: [x] Alert  [x] Cooperative  [x]  Pleasant  [] Confused  [] Agitated  [] Uncooperative  [] Distractible [] Motivated  [] Self-Limiting [] Anxious  [] Other:  Endurance:  [x] Adequate for participation in SLP sessions  [] Reduced overall  [] Lethargic  [] Other:  Safety: [] No concerns at this time  [x] Reduced insight into deficits  [x]  Reduced safety awareness [] Not following call light procedures  [] Unable to Assess  [] Other:    Current Diet Order:ADULT DIET;  Regular; 4 carb choices (60 gm/meal)  Swallowing Precautions: upright for all intake, stay upright for at least 30 mins after intake, small bites/sips, alternate bites/sips and slow rate of intake        Date: 6/16/2022      Tx session 1  1010 - 1110 Tx session 2  All tx needs met in session 1    Total Timed Code Min 60    Total Treatment Minutes 60    Individual Treatment Minutes 60    Group Treatment Minutes 0    Co-Treat Minutes 0    Variance/Reason:  N/A Pain Hip pain     Pain Intervention [] RN notified  [] Repositioned  [x] Intervention offered and patient declined  [] N/A  [] Other:  [] RN notified  [] Repositioned  [] Intervention offered and patient declined  [] N/A  [] Other:   Subjective     Pt alert and oriented, cooperative and agreeable to participate in therapy. Pt upright in bedside chair for session. Objective:  Goals     Dysphagia Goals:  Short-term Goals  Timeframe for Short-term Goals: 14 days (6/18/22)     Goal 1. The patient will tolerate recommended diet without observed clinical signs of aspiration. Goal met 06/09/22. Pt tolerating IDDSI Level 7 regular solids with thin liquids, meds whole with PO as LRD. Goal 2. The patient/caregiver will demonstrate understanding of compensatory strategies for improved swallowing safety. Goal met 06/09/22. Pt tolerating IDDSI Level 7 regular solids with thin liquids, meds whole with PO as LRD. Speech/Lang/Cog Goals:  Short-term Goals  Timeframe for Short-term Goals: 14 days (6/18/22)    Goal 1: Pt will complete graded recall tasks using compensatory strategies with 80% acc given mod cues. Sorting and Remembering Categories  -pt given 12 items that could be sorted into 3 groups  -edu re: recall strategies - grouping/chunking, repetition, association, visualization  -immediate recall: pt recalled 9/12 items indep  -20 min delay: pt recalled 11/12 items indep; +1 given min cues       Goal 2: Pt will complete executive function tasks (meds, money, math, time, etc) with 80% acc given mod cues.        Telling Time on clock  -pt completed with 90% acc indep, improved to 100% acc given min cues    Setting Time on clock   -pt completed with 90% acc indep, improved to 100% acc given min cues      Goal 3: Pt will complete verbal and visual reasoning tasks with 80% acc given mod cues   Word Deduction Task   -pt given 4 clues that describe an item; asked to name the item being described  -e.g. laces, sole, leather, feet = shoe  -pt completed task with 60% acc indep, improved to 100% acc given mod cues       Goal 4: Pt will complete problem solving and thought organization tasks with 80% acc given mod cues   Pt reports having a life alert at home and will use / wear it. Thought organization targeted in goal #5. See below. Goal 5: Pt will complete graded attention tasks (e.g. sustained, selective, alternative, divided) with 80% acc given min cues   Letter Attention Task   -pt instructed to go through the alphabet and alternating naming a girl's name and then a boy's name  -e.g. A = Dayana Wilkins = Kerrie Velasquez = Xiao  -pt completed task with 77% acc indep, improved to 85% acc given min-mod cues       Other areas targeted: N/a    Education:   SLP edu pt re: rationale of tx tasks, recall strategies, attention and thought org strategies     Safety Devices: [x] Call light within reach  [x] Chair alarm activated  [] Bed alarm activated  [] Other: [] Call light within reach  [] Chair alarm activated  [] Bed alarm activated  [] Other:    Assessment: Pt alert and cooperative, agreeable to tx. Edu pt re: recall strategies - repetition, grouping/chunking, association, visualization. Use of these strategies improved pt's ability to recall 12 items in a sorting and remembering categories task immediately and following a 20 min delay. Pt did well telling time, and using the clock to set the time, as well. Pt benefited from mod cues to improve acc with a word deduction task. Pt's attention is a barrier to overall cognitive tasks; benefited from min-mod cues to complete in a letter attention task (also targeting thought organization). Continue goals above. Plan: Continue as per plan of care. Additional Information:     Barriers toward progress: Confusion and Cognitive deficit  Discharge recommendations:  [] Home independently  [] Home with assistance [x]  24 hour supervision  [] ECF [] Other:  Continued Tx Upon Discharge: ? [x] Yes [] No [] TBD based on progress while on ARU [] Vital Stim indicated [] Other:   Estimated discharge date: 06/18/22    Interventions used this date:  [] Speech/Language Treatment  [] Instruction in HEP [] Group [] Dysphagia Treatment [x] Cognitive Treatment   [] Other:       Total Time Breakdown / Charges    Time in Time out Total Time / units   Cognitive Tx 1010 1110 60 min / 4 units    Speech Tx -- -- --   Dysphagia Tx -- -- --       Electronically Signed by     Angelic Londono MA CCC-SLP #[id number]  Speech Language Pathologist

## 2022-06-17 LAB
GLUCOSE BLD-MCNC: 159 MG/DL (ref 70–99)
GLUCOSE BLD-MCNC: 186 MG/DL (ref 70–99)
GLUCOSE BLD-MCNC: 268 MG/DL (ref 70–99)
GLUCOSE BLD-MCNC: 328 MG/DL (ref 70–99)
PERFORMED ON: ABNORMAL

## 2022-06-17 PROCEDURE — 97110 THERAPEUTIC EXERCISES: CPT

## 2022-06-17 PROCEDURE — 87635 SARS-COV-2 COVID-19 AMP PRB: CPT

## 2022-06-17 PROCEDURE — 6360000002 HC RX W HCPCS: Performed by: STUDENT IN AN ORGANIZED HEALTH CARE EDUCATION/TRAINING PROGRAM

## 2022-06-17 PROCEDURE — 97116 GAIT TRAINING THERAPY: CPT

## 2022-06-17 PROCEDURE — 97530 THERAPEUTIC ACTIVITIES: CPT

## 2022-06-17 PROCEDURE — 97130 THER IVNTJ EA ADDL 15 MIN: CPT

## 2022-06-17 PROCEDURE — 1280000000 HC REHAB R&B

## 2022-06-17 PROCEDURE — 97535 SELF CARE MNGMENT TRAINING: CPT

## 2022-06-17 PROCEDURE — 97129 THER IVNTJ 1ST 15 MIN: CPT

## 2022-06-17 PROCEDURE — 6370000000 HC RX 637 (ALT 250 FOR IP): Performed by: STUDENT IN AN ORGANIZED HEALTH CARE EDUCATION/TRAINING PROGRAM

## 2022-06-17 RX ORDER — PANTOPRAZOLE SODIUM 40 MG/1
40 TABLET, DELAYED RELEASE ORAL
Qty: 30 TABLET | Refills: 3
Start: 2022-06-18

## 2022-06-17 RX ORDER — METHOCARBAMOL 500 MG/1
500 TABLET, FILM COATED ORAL 4 TIMES DAILY
Qty: 40 TABLET | Refills: 0
Start: 2022-06-17 | End: 2022-06-27

## 2022-06-17 RX ORDER — OXYCODONE HYDROCHLORIDE 5 MG/1
2.5 TABLET ORAL EVERY 8 HOURS PRN
Qty: 9 TABLET | Refills: 0
Start: 2022-06-17 | End: 2022-06-20

## 2022-06-17 RX ORDER — NIFEDIPINE 30 MG/1
60 TABLET, EXTENDED RELEASE ORAL DAILY
Qty: 30 TABLET | Refills: 3
Start: 2022-06-18

## 2022-06-17 RX ORDER — BISACODYL 10 MG
10 SUPPOSITORY, RECTAL RECTAL DAILY PRN
Qty: 30 SUPPOSITORY | Refills: 0
Start: 2022-06-17 | End: 2022-07-17

## 2022-06-17 RX ORDER — INSULIN LISPRO 100 [IU]/ML
0-6 INJECTION, SOLUTION INTRAVENOUS; SUBCUTANEOUS NIGHTLY
Qty: 1 EACH | Refills: 0
Start: 2022-06-17

## 2022-06-17 RX ORDER — ATORVASTATIN CALCIUM 40 MG/1
40 TABLET, FILM COATED ORAL NIGHTLY
Qty: 30 TABLET | Refills: 3
Start: 2022-06-17

## 2022-06-17 RX ORDER — POLYETHYLENE GLYCOL 3350 17 G/17G
17 POWDER, FOR SOLUTION ORAL DAILY
Qty: 527 G | Refills: 0
Start: 2022-06-18 | End: 2022-07-18

## 2022-06-17 RX ORDER — GABAPENTIN 100 MG/1
300 CAPSULE ORAL 2 TIMES DAILY
Qty: 180 CAPSULE | Refills: 0
Start: 2022-06-17 | End: 2022-07-17

## 2022-06-17 RX ORDER — TROSPIUM CHLORIDE 20 MG/1
20 TABLET, FILM COATED ORAL
Qty: 60 TABLET | Refills: 3
Start: 2022-06-17

## 2022-06-17 RX ORDER — SERTRALINE HYDROCHLORIDE 100 MG/1
100 TABLET, FILM COATED ORAL DAILY
Qty: 30 TABLET | Refills: 3
Start: 2022-06-18

## 2022-06-17 RX ORDER — SENNA AND DOCUSATE SODIUM 50; 8.6 MG/1; MG/1
2 TABLET, FILM COATED ORAL DAILY
Qty: 30 TABLET | Refills: 0
Start: 2022-06-18

## 2022-06-17 RX ORDER — INSULIN LISPRO 100 [IU]/ML
0-12 INJECTION, SOLUTION INTRAVENOUS; SUBCUTANEOUS
Qty: 1 EACH | Refills: 0
Start: 2022-06-17

## 2022-06-17 RX ORDER — TRAZODONE HYDROCHLORIDE 50 MG/1
50 TABLET ORAL NIGHTLY PRN
Qty: 30 TABLET | Refills: 0
Start: 2022-06-17

## 2022-06-17 RX ORDER — INSULIN GLARGINE 100 [IU]/ML
22 INJECTION, SOLUTION SUBCUTANEOUS NIGHTLY
Qty: 10 ML | Refills: 3
Start: 2022-06-17

## 2022-06-17 RX ADMIN — METHOCARBAMOL 500 MG: 500 TABLET ORAL at 12:52

## 2022-06-17 RX ADMIN — METHOCARBAMOL 500 MG: 500 TABLET ORAL at 17:58

## 2022-06-17 RX ADMIN — ATORVASTATIN CALCIUM 40 MG: 40 TABLET, FILM COATED ORAL at 20:14

## 2022-06-17 RX ADMIN — SERTRALINE 100 MG: 50 TABLET, FILM COATED ORAL at 10:10

## 2022-06-17 RX ADMIN — TROSPIUM CHLORIDE 20 MG: 20 TABLET, FILM COATED ORAL at 17:58

## 2022-06-17 RX ADMIN — MICONAZOLE NITRATE: 2 POWDER TOPICAL at 10:11

## 2022-06-17 RX ADMIN — INSULIN LISPRO 2 UNITS: 100 INJECTION, SOLUTION INTRAVENOUS; SUBCUTANEOUS at 06:32

## 2022-06-17 RX ADMIN — CYANOCOBALAMIN TAB 500 MCG 1000 MCG: 500 TAB at 10:10

## 2022-06-17 RX ADMIN — ASPIRIN 81 MG: 81 TABLET, COATED ORAL at 10:10

## 2022-06-17 RX ADMIN — INSULIN LISPRO 4 UNITS: 100 INJECTION, SOLUTION INTRAVENOUS; SUBCUTANEOUS at 20:20

## 2022-06-17 RX ADMIN — INSULIN LISPRO 2 UNITS: 100 INJECTION, SOLUTION INTRAVENOUS; SUBCUTANEOUS at 11:34

## 2022-06-17 RX ADMIN — INSULIN GLARGINE 22 UNITS: 100 INJECTION, SOLUTION SUBCUTANEOUS at 20:14

## 2022-06-17 RX ADMIN — NIFEDIPINE 60 MG: 30 TABLET, FILM COATED, EXTENDED RELEASE ORAL at 10:10

## 2022-06-17 RX ADMIN — OXYCODONE 2.5 MG: 5 TABLET ORAL at 15:01

## 2022-06-17 RX ADMIN — TROSPIUM CHLORIDE 20 MG: 20 TABLET, FILM COATED ORAL at 05:54

## 2022-06-17 RX ADMIN — GABAPENTIN 300 MG: 300 CAPSULE ORAL at 10:10

## 2022-06-17 RX ADMIN — METHOCARBAMOL 500 MG: 500 TABLET ORAL at 10:10

## 2022-06-17 RX ADMIN — PANTOPRAZOLE SODIUM 40 MG: 40 TABLET, DELAYED RELEASE ORAL at 05:54

## 2022-06-17 RX ADMIN — METHOCARBAMOL 500 MG: 500 TABLET ORAL at 20:14

## 2022-06-17 RX ADMIN — GABAPENTIN 300 MG: 300 CAPSULE ORAL at 20:14

## 2022-06-17 RX ADMIN — ENOXAPARIN SODIUM 40 MG: 100 INJECTION SUBCUTANEOUS at 10:11

## 2022-06-17 ASSESSMENT — PAIN SCALES - GENERAL
PAINLEVEL_OUTOF10: 4
PAINLEVEL_OUTOF10: 0
PAINLEVEL_OUTOF10: 6
PAINLEVEL_OUTOF10: 4
PAINLEVEL_OUTOF10: 0
PAINLEVEL_OUTOF10: 4

## 2022-06-17 ASSESSMENT — PAIN - FUNCTIONAL ASSESSMENT
PAIN_FUNCTIONAL_ASSESSMENT: ACTIVITIES ARE NOT PREVENTED

## 2022-06-17 ASSESSMENT — PAIN DESCRIPTION - DESCRIPTORS
DESCRIPTORS: ACHING

## 2022-06-17 ASSESSMENT — PAIN DESCRIPTION - LOCATION
LOCATION: HIP;LEG
LOCATION: LEG;HIP
LOCATION: LEG

## 2022-06-17 ASSESSMENT — PAIN DESCRIPTION - ORIENTATION
ORIENTATION: LEFT

## 2022-06-17 NOTE — PROGRESS NOTES
Occupational Therapy  Discharge Summary     Name:Saravanan Bill  RJP:[id number]  :1945  Treatment Diagnosis: Impaired balance and gait  Diagnosis: Fall, L hip fx s/p IMN    Restrictions/Precautions  Restrictions/Precautions: Weight Bearing,Fall Risk,General Precautions   Lower Extremity Weight Bearing Restrictions  Right Lower Extremity Weight Bearing: Weight Bearing As Tolerated  Left Lower Extremity Weight Bearing: Weight Bearing As Tolerated       Position Activity Restriction  Other position/activity restrictions: balance concerns     Goals:   Short Term Goals  Time Frame for Short term goals: 1 week- 6/10/22  Short Term Goal 1: Pt will perform functional transfers with max A. GOAL MET 22 Pt performed functional transfers with min A. Short Term Goal 2: Pt will complete toileting with max A. GOAL MET 6/10/22 Pt completed toileting with max A with assist of nursing per patient report. Short Term Goal 3: Pt will perform LB dressing with mod A. GOAL MET 22 Pt performed LB dressing with min A. Short Term Goal 4: Pt will complete bathing with mod A. GOAL MET 22 Pt completed bathing with CGA. Long Term Goals  Time Frame for Long term goals : 3 weeks- 22  Long Term Goal 1: Pt will complete functional transfers with CGA. -- GOAL MET   Long Term Goal 2: Pt will perform bathing with CGA. GOAL MET 22 Pt performed bathing with CGA. Long Term Goal 3: Pt will complete full body dressing with CGA. GOAL MET 22 Pt performed full body dressing with SBA. Long Term Goal 4: Pt will perform toileting with CGA. Goal Not Met. Pt requires mod A for toileting at times. Long Term Goal 5: Pt will perform grooming at sink with mod I.-- GOAL MET     Pt. Met 4/4 short term goals and 4/5 long term goals. Pt will discharge to SNF to continue functional progression prior to discharge home alone. ADL:  Feeding  Assistance Level:  Independent  Grooming/Oral Hygiene  Assistance Level: Modified independent  Skilled Clinical Factors: seated at sink to brush teeth and comb hair  Upper Extremity Bathing  Assistance Level: Independent  Lower Extremity Bathing  Equipment Provided: Long-handled sponge  Assistance Level: Supervision  Upper Extremity Dressing  Assistance Level: Set-up  Lower Extremity Dressing  Assistance Level: Stand by assist  Skilled Clinical Factors: increased time to thread LLE into brief but able to with use of reacher, SBA to stand to pull over hips  Putting On/Taking Off Footwear  Equipment Provided: Sock aid;Reachers  Assistance Level: Modified independent  Toileting  Assistance Level: Moderate assistance  Skilled Clinical Factors: assist for thoroughness of bowel hygiene, CGA for pants management up/down  Toilet Transfers  Technique: Stand pivot  Equipment: Raised toilet seat with arms  Additional Factors: With handrails; Increased time to complete  Assistance Level: Stand by assist  Tub/Shower Transfers  Type: Shower  Transfer From: Deb Levy  Transfer To: Tub transfer bench  Additional Factors: With handrails;Verbal cues;Cues for hand placement  Assistance Level: Contact guard assist  Skilled Clinical Factors: CGA with RW          Functional Mobility  Device: Rolling walker  Activity: To/From bathroom  Assistance Level: Contact guard assist  Skilled Clinical Factors: CGA with RW in/out of bathroom, SPV with w/c to/from gym  Transfers  Surface: From chair with arms; Wheelchair; To chair with arms;Raised toilet Seat  Additional Factors: Verbal cues; Hand placement cues; With handrails  Device: Walker  Sit to Stand  Assistance Level: Stand by assist  Stand to Sit  Assistance Level:  Moderate assistance  Skilled Clinical Factors: mod A with posterior LOB into w/c but SBA for remainder of session  Stand Pivot  Assistance Level: Contact guard assist   OT Exercises  Resistive Exercises: 3# weight x20 for wrist flexion, wrist extension, supination/pronation, shoulder flexion, shoulder horizontal abduction, chest press, elbow flexion  Circulation/Endurance Exercises: sit<>stands x10 with green theraband pulling pt backward       Assessment:   Assessment  Assessment: Pt agreeable to OT session. Pt performed functional transfers with SBA/CGA this date and improved balance for ADLs this date. Pt demonstrated LOB in gym requiring mod A to safely return to w/c but CGA/SBA for all standing in bathroom during ADLs. Pt completed bathing with supervision, dressing with supervision/setup, and grooming/eating with mod I while seated. Pt had BM with PT and required assist for thoroughness of bowel hygiene. Pt completed BUE ther ex with good strength for 3# weights, fair coordination to toss unweighted ball with 2# wrist weights on BUEs for 3x20 reps with 75% accuracy to toss ball. Pt required increased time and min VCs for naming items while tossing ball. Pt completed standing to table top for 4:45 for drill/bolt task with good problem solving and coordination with SBA but LOB backwards when attempting to sit at end of task, requiring mod A to safely reach chair  Safety Devices  Safety Devices in place: Yes  Type of devices: All fall risk precautions in place;Gait belt;Patient at risk for falls;Call light within reach;Nurse notified; Left in chair;Chair alarm in place    Equipment Recommendations:  Defer to next level of care         Electronically signed by Laisha Bean OT on 6/17/2022 at 11:46 AM

## 2022-06-17 NOTE — FLOWSHEET NOTE
Completed POA according to patient's wishes. Prayer, pt shared review of the last month or so and encouraged family with how well they are supporting Lynn Celena. Cherelle Shields  Thank you for consulting Spiritual Care    If you need a  for emotional, spiritual or comfort care,   -or- assistance with [id number] [place] [address] or Living Will forms,  please dial \"[phone]\" and ask for the Eal [facility] on-call to be paged.     You may also call us directly:  [phone] Bright  [phone] ([phone]) [facility]  [phone]) Outpatient

## 2022-06-17 NOTE — CARE COORDINATION
CM tried to call Luisa Alas (wife) and the number is either disconnected or changed. CM then spoke with Enedelia Polanco (daughter) via telephone. CM discussed transport time for his discharge tomorrow. Yusef Moses asked to if she could get the [facility] papers. MIRNA [facility]. Yusef Moses stated that she will be here between [phone] today 06/17. Shilpi Milian, RN    1005 MIRNA spoke with pastoral care to have [facility] papers for daughter ready at [phone] today 06/17.  Shilpi Milian RN

## 2022-06-17 NOTE — PROGRESS NOTES
Occupational Therapy  Facility/Department: [address] [place] [address]  Rehabilitation Occupational Therapy Daily Treatment Note    Date: 22  Patient Name: Divine Wright       Room: 5888/8372-44  MRN: [id number]  Account: [de-identified]   : 1945  (71 y.o.) Gender: male                    Past Medical History:  has a past medical history of Diabetes mellitus ([address] and Hypertension. Past Surgical History:   has no past surgical history on file. Restrictions  Restrictions/Precautions: Weight Bearing; Fall Risk;General Precautions  Other position/activity restrictions: balance concerns  Right Lower Extremity Weight Bearing: Weight Bearing As Tolerated  Left Lower Extremity Weight Bearing: Weight Bearing As Tolerated    Subjective  Subjective: Pt in recliner, pleasant, pain 5/10 in L hip, agreeable to OT session, /52, HR 69, O2 sats 94% on RA. Restrictions/Precautions: Weight Bearing; Fall Risk;General Precautions             Objective     Cognition  Overall Cognitive Status: Exceptions  Arousal/Alertness: Appropriate responses to stimuli  Following Commands: Follows one step commands with repetition; Follows one step commands with increased time  Attention Span: Attends with cues to redirect  Memory: Decreased recall of recent events;Decreased short term memory;Decreased recall of precautions  Safety Judgement: Decreased awareness of need for assistance  Problem Solving: Assistance required to identify errors made;Assistance required to generate solutions;Assistance required to implement solutions  Insights: Decreased awareness of deficits  Initiation: Requires cues for some  Sequencing: Requires cues for some  Orientation  Overall Orientation Status: Within Functional Limits   Perception  Overall Perceptual Status: Impaired  Unilateral Attention: Cues to maintain midline in standing;Cues to maintain midline in sitting  Initiation: Cues to initiate tasks  Motor Planning: Appears intact  Perseveration: Perseverates during conversation     ADL  Feeding  Assistance Level: Independent  Grooming/Oral Hygiene  Assistance Level: Modified independent  Skilled Clinical Factors: seated at sink to brush teeth and comb hair  Upper Extremity Bathing  Assistance Level: Independent  Lower Extremity Bathing  Equipment Provided: Long-handled sponge  Assistance Level: Supervision  Upper Extremity Dressing  Assistance Level: Set-up  Lower Extremity Dressing  Assistance Level: Stand by assist  Skilled Clinical Factors: increased time to thread LLE into brief but able to with use of reacher, SBA to stand to pull over hips  Putting On/Taking Off Footwear  Equipment Provided: Sock aid;Reachers  Assistance Level: Modified independent  Toileting  Assistance Level: Moderate assistance  Skilled Clinical Factors: assist for thoroughness of bowel hygiene, CGA for pants management up/down  Toilet Transfers  Technique: Stand pivot  Equipment: Raised toilet seat with arms  Additional Factors: With handrails; Increased time to complete  Assistance Level: Stand by assist  Tub/Shower Transfers  Type: Shower  Transfer From: Dana Lopez  Transfer To: Tub transfer bench  Additional Factors: With handrails;Verbal cues;Cues for hand placement  Assistance Level: Contact guard assist  Skilled Clinical Factors: CGA with RW          Functional Mobility  Device: Rolling walker  Activity: To/From bathroom  Assistance Level: Contact guard assist  Skilled Clinical Factors: CGA with RW in/out of bathroom, SPV with w/c to/from gym  Transfers  Surface: From chair with arms; Wheelchair; To chair with arms;Raised toilet Seat  Additional Factors: Verbal cues; Hand placement cues; With handrails  Device: Walker  Sit to Stand  Assistance Level: Stand by assist  Stand to Sit  Assistance Level:  Moderate assistance  Skilled Clinical Factors: mod A with posterior LOB into w/c but SBA for remainder of session  Stand Pivot  Assistance Level: Contact guard assist   OT Exercises  Resistive Exercises: 3# weight x20 for wrist flexion, wrist extension, supination/pronation, shoulder flexion, shoulder horizontal abduction, chest press, elbow flexion  Circulation/Endurance Exercises: sit<>stands x10 with green theraband pulling pt backward     Assessment  Assessment  Assessment: Pt agreeable to OT session. Pt performed functional transfers with SBA/CGA this date and improved balance for ADLs this date. Pt demonstrated LOB in gym requiring mod A to safely return to w/c but CGA/SBA for all standing in bathroom during ADLs. Pt completed bathing with supervision, dressing with supervision/setup, and grooming/eating with mod I while seated. Pt had BM with PT and required assist for thoroughness of bowel hygiene. Pt completed BUE ther ex with good strength for 3# weights, fair coordination to toss unweighted ball with 2# wrist weights on BUEs for 3x20 reps with 75% accuracy to toss ball. Pt required increased time and min VCs for naming items while tossing ball. Pt completed standing to table top for 4:45 for drill/bolt task with good problem solving and coordination with SBA but LOB backwards when attempting to sit at end of task, requiring mod A to safely reach chair  Safety Devices  Safety Devices in place: Yes  Type of devices: All fall risk precautions in place;Gait belt;Patient at risk for falls;Call light within reach;Nurse notified; Left in chair;Chair alarm in place    Patient Education  Education  Education Given To: Patient  Education Provided: Role of Therapy; ADL Function;Plan of Care;Transfer Training; Safety; Fall Prevention Strategies; Equipment; Mobility Training;Precautions;DME/Home Modifications;Cognition; Energy Conservation;Home Exercise Program  Education Provided Comments: balance training, reaching outside STEVEN, dynamic sitting and standing balance, d/c planning, use of a/e, safety with ADLs  Education Method: Verbal;Demonstration  Barriers to Learning: Cognition  Education Outcome: Verbalized understanding;Demonstrated understanding;Continued education needed    Plan  Plan  Times per Week: 5/7 days/week  Plan Weeks: 3 weeks  Current Treatment Recommendations: Strengthening;ROM;Balance training;Functional mobility training; Wheelchair mobility training; Safety education & training;Neuromuscular re-education;Cognitive reorientation; Endurance training;Pain management;Self-Care / ADL; Home management training;Cognitive/Perceptual training;Coordination training;Equipment evaluation, education, & procurement;Patient/Caregiver education & training    Goals  Short Term Goals  Time Frame for Short term goals: 1 week- 6/10/22  Short Term Goal 1: Pt will perform functional transfers with max A. GOAL MET 6/9/22 Pt performed functional transfers with min A. Short Term Goal 2: Pt will complete toileting with max A. GOAL MET 6/10/22 Pt completed toileting with max A with assist of nursing per patient report. Short Term Goal 3: Pt will perform LB dressing with mod A. GOAL MET 6/9/22 Pt performed LB dressing with min A. Short Term Goal 4: Pt will complete bathing with mod A. GOAL MET 6/9/22 Pt completed bathing with CGA. Long Term Goals  Time Frame for Long term goals : 3 weeks- 6/23/22  Long Term Goal 1: Pt will complete functional transfers with CGA. -- GOAL MET 6/13  Long Term Goal 2: Pt will perform bathing with CGA. GOAL MET 6/14/22 Pt performed bathing with CGA. Long Term Goal 3: Pt will complete full body dressing with CGA. GOAL MET 6/17/22 Pt performed full body dressing with SBA. Long Term Goal 4: Pt will perform toileting with CGA. Goal Not Met. Pt requires mod A for toileting at times.   Long Term Goal 5: Pt will perform grooming at sink with mod I.-- GOAL MET 6/13      Therapy Time   Individual Concurrent Group Co-treatment   Time In 1000         Time Out 1130         Minutes 90         Timed Code Treatment Minutes: Mehreen Campos [place]

## 2022-06-17 NOTE — PROGRESS NOTES
Physical Therapy  Discharge Summary    Name:Saravanan Holliday  WWW:[id number]  :1945  Treatment Diagnosis: Impaired balance and gait  Diagnosis: Fall, L hip fx s/p IMN    Restrictions/Precautions  Restrictions/Precautions: Weight Bearing,Fall Risk,General Precautions   Lower Extremity Weight Bearing Restrictions  Right Lower Extremity Weight Bearing: Weight Bearing As Tolerated  Left Lower Extremity Weight Bearing: Weight Bearing As Tolerated       Position Activity Restriction  Other position/activity restrictions: balance concerns     Goals:                  Short Term Goals  Time Frame for Short term goals: 10 days 22  Short term goal 1: Pt will complete supine to/from sit with Cristobal- GOAL NOT MET  Short term goal 2: Pt will complete sit to/from stand with RW with Cristobal- GOAL MET  Short term goal 3: Pt will complete bed <> chair with RW with modA- GOAL MET  Short term goal 4: Pt will ambulate x 15' with RW with modA without LOB- GOAL MET            Long Term Goals  Time Frame for Long term goals : 21 days 22  Long term goal 1: Pt will complete supine to/from sit with SBA- GOAL MET, pt supv. w/ bed mobility  Long term goal 2: Pt will complete functional t/f with RW with CGA- GOAL MET, pt SBA-CGA with all transfers. pt still requires cuing for safety  Long term goal 3: Pt will ambulate x 48' with RW with CGA without LOB- GOAL MET, pt able to ambulate 240' w/o LOB but requires freq. cues for safety  Long term goal 4: Pt will complete car t/f with RW and CGA- GOAL MET    Pt. Met 3/4 short term goals and 4/4 long term goals. Pt. Currently ambulates 240 feet with RW and SBA-CGA depending on fatigue  Up/down 4 steps with BUE support and CGA  Up/down curb step with CGA and RW  Sit to/from stand with SBA  Bed mobility with Supv.   Recommend DC to SNF in order to progress to independence for family approval to return home     Electronically signed by Niki Mancia on 2022 at 12:17 PM

## 2022-06-17 NOTE — PROGRESS NOTES
Physical Therapy  Facility/Department: [facility]  Rehabilitation Physical Therapy Treatment Note    NAME: Silas Smyth  : 1945 (78 y.o.)  MRN: [id number]  CODE STATUS: Full Code    Date of Service: 22       Restrictions:  Restrictions/Precautions: Weight Bearing; Fall Risk;General Precautions  Lower Extremity Weight Bearing Restrictions  Right Lower Extremity Weight Bearing: Weight Bearing As Tolerated  Left Lower Extremity Weight Bearing: Weight Bearing As Tolerated  Position Activity Restriction  Other position/activity restrictions: balance concerns     SUBJECTIVE  Subjective  Subjective: pt found in recliner  Pain: pt reports 3-4/10 L hip discomfort. OBJECTIVE  Cognition  Overall Cognitive Status: Exceptions  Arousal/Alertness: Appropriate responses to stimuli  Following Commands: Follows one step commands with repetition; Follows one step commands with increased time  Attention Span: Attends with cues to redirect  Memory: Decreased recall of recent events;Decreased short term memory;Decreased recall of precautions  Safety Judgement: Decreased awareness of need for assistance  Problem Solving: Assistance required to identify errors made;Assistance required to generate solutions;Assistance required to implement solutions  Insights: Decreased awareness of deficits  Initiation: Requires cues for some  Sequencing: Requires cues for some  Orientation  Overall Orientation Status: Within Functional Limits  Orientation Level: Oriented to person;Oriented to situation;Oriented to time;Oriented to place    Functional Mobility     Pt found in recliner with breakfast. Pt vitals assessed     STS from recliner to RW with SBA. Pt utilizes momentum to help himself into standing. Pt sits EOB and completes bed mobility assessment. Pt supv. W/ sit->supine, roll L and roll R, supine -> sit. STS EOB to RW with SBA. x240' gait level surface + 10' gait unlevel surface with RW and SBA-CGA.  Pt is mostly SBA with gait but as he fatigues CGA and increased cuing is necessary for safety. Pt shows improvements in posture and equal stepping today. As pt fatigues he begins flex at the trunk/hip/knees and requires cuing for improved posture. STS from [facility] to car SBA. Pt requires cuing for hand placement and proper technique for a safe transfer. Pt completed x4 stair ascend/descned with BUE support and CGA. Pt requires cuing for sequence of stepping and technique for safety. Pt fatigues quickly and PT stops assessment at 4 steps. Dyn standing with 0-1 UE support and SBA: pt standing at raised table-> pt cued to refrain from UE support-> completed 5 min of connect 4 game with PT. Pt shows improvements in standing balance as well as standing tolerance today. Standing ther Ex w/ RW and SBA: donned 3# ankle weight. Pt completed x10 marches and HS curls. Pt reported need to use restroom and had to halt exercise. STS from [facility] to raised toilet with CGA. Pt was dependent on PT for cleaning following BM. STS from [facility] to recliner SBA. Pt left in recliner with call button and other needs within reach. ASSESSMENT/PROGRESS TOWARDS GOALS  Vital Signs  Heart Rate: 81  Heart Rate Source: Monitor  BP: 130/76  BP Location: Left upper arm  MAP (Calculated): 94  SpO2: 95 %  O2 Device: None (Room air)    Assessment  Assessment: pt DC to SNF 6/18 d/t continued need for therapy and 24/7 care. pts family has noted desire for pt to be ind. prior to returning home. pt is supv with bed mobilty, SBA with transfers including car transfer, SBA-CGA with gait, and CGA with navigating up to 4 stairs. pt continues to require frequent cuing on safety and awareness of body mechanics. pt will require RW and standard WC for mobiltiy d/t limitations in endurance, weight bearing tolerance, insight, and overall safety awareness. Activity Tolerance: Patient limited by fatigue;Patient limited by endurance; Patient limited by pain; Patient tolerated treatment well  Discharge Recommendations: 24 hour supervision or assist;Home with Home health PT  PT Equipment Recommendations  Equipment Needed: Yes  Wheelchair: Standard  Other: CTA, pt may require manual w/c with cushion, pending progression of mobility and gait, pt reports he owns RW, 7JQ and electric scooter for community    Goals  Patient Goals   Patient goals : \"Get stronger so I can get outside and be able to do things I on my own\"  Short Term Goals  Time Frame for Short term goals: 10 days 6/12/22  Short term goal 1: Pt will complete supine to/from sit with Cristobal- GOAL NOT MET  Short term goal 2: Pt will complete sit to/from stand with RW with Cristobal- GOAL MET  Short term goal 3: Pt will complete bed <> chair with RW with modA- GOAL MET  Short term goal 4: Pt will ambulate x 15' with RW with modA without LOB- GOAL MET  Long Term Goals  Time Frame for Long term goals : 21 days 6/23/22  Long term goal 1: Pt will complete supine to/from sit with SBA- GOAL MET, pt supv. w/ bed mobility  Long term goal 2: Pt will complete functional t/f with RW with CGA- GOAL MET, pt SBA-CGA with all transfers. pt still requires cuing for safety  Long term goal 3: Pt will ambulate x 48' with RW with CGA without LOB- GOAL MET, pt able to ambulate 240' w/o LOB but requires freq. cues for safety  Long term goal 4: Pt will complete car t/f with RW and CGA- GOAL MET    PLAN OF CARE/SAFETY  Plan  Plan: 5-7 times per week  Plan weeks: 3 weeks  Specific Instructions for Next Treatment: Progress mobility as tolerated  Current Treatment Recommendations: Strengthening; Wheelchair mobility training;Equipment evaluation, education, & procurement;Balance training;Gait training;Pain management; Modalities;Stair training;Functional mobility training;Transfer training;Neuromuscular re-education;Home exercise program;Positioning; Safety education & training;Manual Therapy - Soft Tissue Mobilization;Patient/Caregiver education & training; Therapeutic activities; Endurance training;Manual Therapy - Joint Manipulation;Cognitive reorientation  Safety Devices  Type of Devices: All fall risk precautions in place;Call light within reach; Patient at risk for falls;Gait belt;Nurse notified; Left in chair;Chair alarm in place  Restraints  Restraints Initially in Place: No    EDUCATION  Education  Education Given To: Patient  Education Provided: Role of Therapy;Plan of Care;Precautions; Safety; Fall Prevention Strategies;DME/Home Modifications;Transfer Training;Equipment;ADL Function;Cognition;Mobility Training  Education Provided Comments: pt educated on importance of OOB mobility, need for safety with fatigue and buckling, safe use of hand placement and RW - requires reinforcement  Education Method: Demonstration;Verbal  Barriers to Learning: Cognition; Hearing  Education Outcome: Continued education needed;Verbalized understanding; Unable to demonstrate understanding        Therapy Time   Individual Concurrent Group Co-treatment   Time In 0800         Time Out 0900         Minutes 60           Timed Code Treatment Minutes: 61 Minutes       Coleridge Angelucci, 06/17/22 at 12:27 PM

## 2022-06-17 NOTE — PROGRESS NOTES
Lida Gregg  6/17/2022  [id number]    Chief Complaint: S/p left hip fracture    Subjective:   No acute events overnight. Today Esther Goncalves is seen in his room with his daughter and grandchildren. He reports that his cough is improving. He is looking forward to discharge to SNF tomorrow. ROS: denies f/c, n/v, cp, sob    Objective:  Patient Vitals for the past 24 hrs:   BP Temp Temp src Pulse Resp SpO2   06/17/22 1501 -- -- -- -- 16 --   06/17/22 1208 130/76 -- -- 81 -- 95 %   06/17/22 0745 (!) 168/72 97.6 °F (36.4 °C) Oral 64 18 95 %   06/16/22 2245 (!) 167/57 98 °F (36.7 °C) Oral 69 20 92 %     Gen: No distress, pleasant. Supine in bed  HEENT: Normocephalic, atraumatic. CV: extremities well perfused  Resp: No respiratory distress. Abd: Soft, nontender   Ext: No edema. Neuro: Alert, oriented, appropriately interactive.  Speech fluent, delayed processing    Wt Readings from Last 3 Encounters:   06/12/22 207 lb 14.4 oz (94.3 kg)       Laboratory data:   Lab Results   Component Value Date    WBC 5.2 06/16/2022    HGB 10.3 (L) 06/16/2022    HCT 32.1 (L) 06/16/2022    MCV 77.4 (L) 06/16/2022     06/16/2022       Lab Results   Component Value Date     06/16/2022    K 4.8 06/16/2022     06/16/2022    CO2 29 06/16/2022    BUN 15 06/16/2022    CREATININE 0.8 06/16/2022    GLUCOSE 158 06/16/2022    CALCIUM 9.0 06/16/2022        Therapy progress:  PT  Position Activity Restriction  Other position/activity restrictions: balance concerns  Objective     Sit to Stand: Minimal Assistance  Stand to sit: Minimal Assistance  Bed to Chair: Dependent/Total  Device: Rolling Walker  Assistance: Contact guard assistance,Moderate assistance  Distance: 47 ft, 18 ft ( several 180* turns)  OT  PT Equipment Recommendations  Equipment Needed: Yes  Mobility Devices: Wheelchair  Wheelchair: Standard  Other: CTA, pt may require manual w/c with cushion, pending progression of mobility and gait, pt reports he owns RW, [id number] and electric scooter for Zechariah Rivera Used: Standard toilet  Toilet Transfers Comments: use of [facility]  Assessment        SLP    Pt was seen for cognitive tx this date. Pt seen alert and oriented within chair. Pt completed multiple cognitive tasks: abstract organization, concrete organization, verbal reasoning, executive functioning (time), and recall. Pt complete recall task with 91% acc indep. Pt completed concrete organizational/reasoning task with 100% acc indep. Abstract reasoning task completed with 50% acc indep, improving to 82% acc with min cues. Executive functioning (daily time task) completed with 81% accuracy.  came in during visit, pt told dr that he will be in charge of giving own insulin at home after d/c. Pt stated that grandson will be driving him mostly. Pt observed with several regular solids PO trials and TL via cup, tolerating without any difficulty or s/s of aspiration- all dysphagia goals met this date. Continue goals above. Body mass index is 31.61 kg/m². Assessment and Plan:  Christiano Hadley is a 78year old male with a past medical history significant for DM2, HTN, HLD, BERNARDO, COPD, prostate cancer s/p prostatectomy (2019), and GERD who fell down 2 steps and was found to have left hip and femur fracture on 5/6/22 s/p surgery.  He was admitted to [facility] on 6/3/22 due to functional deficits below his baseline.     Left Hip fracture  - s/p repair in [place]  - WBAT  - PT and OT     Post-operative Pain  - tylenol PRN, oxycodone 2.5 mg PRN  - gabapentin 300 mg BID  - robaxin scheduled    Dry Cough  - possibly due to valsartan as patient previously had side effect of cough with lisinopril  - discontinued valsartan and monitoring symptoms  - endorsing that cough is improving  - follow up with PCP    DM2  - Lantus 22 units nightly  - SSI medium dose correction  - has metformin listed as allergy  - reports he was only taking Lantus at home.      COPD  - adequately oxygenating on room   - wean oxygen for SpO2>90%     BERNARDO  - CPAP qhs    History of HTN  - home regimen: amlodipine 10 mg, losartan 100 mg  - discontinued valsartan due to concerns that this may be causing cough since patient has cough listed as a side effect of lisinopril  - changed amlodipine to nifedipine to be able to up titrate     HLD  - statin     History of Prostate Cancer  - s/p prostatectomy in 2019  - was recently told that his cancer is back and tentative plan to resume chemo  - oxybutynin discontinued due to concerns this was contributing to confusion  - follow up outpatient     Obesity  - BMI 41   - encourage lifestyle modifications     Bowels: Schedule stool softener. Follow bowel movements. Enema or suppository if needed.      Bladder: Check PVR x 3. [address] if PVR > 200ml or if any volume is > 500 ml.      Sleep: Trazodone provided prn.      Follow up appointments: PCP    Services/DME: defer to SNF  EDOD: 6/18/22    Piper Lao.  Oswaldo Sanchez MD 6/17/2022, 4:47 PM

## 2022-06-17 NOTE — ACP (ADVANCE CARE PLANNING)
Advance Care Planning     Advance Care Planning Inpatient Note  [facility] Department    Today's Date: 6/17/2022  Unit: [facility]    Received request from patient and family. Upon review of chart and communication with care team, patient's decision making abilities are not in question. . Patient, Healthcare Decision Maker and Child/Children was/were present in the room during visit.     Goals of ACP Conversation:  Discuss advance care planning documents    Health Care Decision Makers:       Primary Decision MakerEalex Clements - Child - [phone]    Secondary Decision Maker: Luciana Lira - Child - [phone]    Summary:  Completed New Documents    Advance Care Planning Documents (Patient Wishes):  Healthcare Power of /Advance Directive Appointment of Health Care Agent     Assessment:    Interventions:  Assisted in the completion of documents according to patient's wishes at this time    Electronically signed by Chaplain Azael on 6/17/2022 at 5:03 PM

## 2022-06-17 NOTE — PROGRESS NOTES
Speech Language Pathology  [facility]: ACUTE REHAB UNIT  SPEECH-LANGUAGE PATHOLOGY      [x] Daily  [] Weekly Care Conference Note  [x] Discharge    Patient:Saravanan Flores      RTK:8/6/9528  ALR:[id number]  Rehab Dx/Hx: S/p left hip fracture [Z87.81]    Precautions: falls  Home situation: pt lives with wife, active , indep with meds, wife manages finances and other household tasks   ST Dx: [] Aphasia  [] Dysarthria  [] Apraxia   [] Oropharyngeal dysphagia [] Cognitive Impairment  [] Other:   Date of Admit: 6/3/2022  Room #: 0151/0151-01    Current functional status (updated daily):         Pt being seen for : [] Speech/Language Treatment  [] Dysphagia Treatment [x] Cognitive Treatment  [] Other:  Communication: [x]WFL  [] Aphasia  [] Dysarthria  [] Apraxia  [] Pragmatic Impairment [] Non-verbal  [] Hearing Loss  [] Other:   Cognition: [] WFL  [x] Mild  [x] Moderate  [] Severe [] Unable to Assess  [] Other:  Memory: [] WFL  [x] Mild  [x] Moderate  [] Severe [] Unable to Assess  [] Other:  Behavior: [x] Alert  [x] Cooperative  [x]  Pleasant  [] Confused  [] Agitated  [] Uncooperative  [] Distractible [] Motivated  [] Self-Limiting [] Anxious  [] Other:  Endurance:  [x] Adequate for participation in SLP sessions  [] Reduced overall  [] Lethargic  [] Other:  Safety: [] No concerns at this time  [x] Reduced insight into deficits  [x]  Reduced safety awareness [] Not following call light procedures  [] Unable to Assess  [] Other:    Current Diet Order:ADULT DIET;  Regular; 4 carb choices (60 gm/meal)  Swallowing Precautions: upright for all intake, stay upright for at least 30 mins after intake, small bites/sips, alternate bites/sips and slow rate of intake        Date: 6/17/2022      Tx session 1  [id number] DISCHARGE SUMMARY   Total Timed Code Min 60    Total Treatment Minutes 60    Individual Treatment Minutes 60    Group Treatment Minutes 0    Co-Treat Minutes 0    Variance/Reason:  N/A    Pain Hip pain-4    Pain Intervention [x] RN notified- administered meds  [] Repositioned  [] Intervention offered and patient declined  [] N/A  [] Other:  [] RN notified  [] Repositioned  [] Intervention offered and patient declined  [] N/A  [] Other:   Subjective     Pt alert and oriented, cooperative and agreeable to participate in therapy. Pt upright in bedside chair for session. Objective:  Goals      Goal met 22. Pt tolerating IDDSI Level 7 regular solids with thin liquids, meds whole with PO as LRD. Goal met 22. Pt tolerating IDDSI Level 7 regular solids with thin liquids via cup, meds whole with PO as LRD. Goal met 22. Pt tolerating IDDSI Level 7 regular solids with thin liquids, meds whole with PO as LRD. Goal met 22. Speech/Lang/Cog Goals:  Short-term Goals  Timeframe for Short-term Goals: 14 days (22)    Goal 1: Pt will complete graded recall tasks using compensatory strategies with 80% acc given mod cues. MOCA:  -functional recall: 100% acc  -short term recall: 80% acc indep improving to 100% acc given min cues  Goal met. Pt with overall improvement in recall. Pt continues to benefit from cues for consistent carryover of strategies. Pt also continues to demonstrate inconsistency with short term recall. Goal 2: Pt will complete executive function tasks (meds, money, math, time, etc) with 80% acc given mod cues. MOCA:  -completing a trail: 100% acc   -copy of a cube: 100% acc  -clock drawin% acc  -serial 7 subtractions: 100% acc     Time word problems:  -100% acc indep   Goal met. Overall improved executive functioning skills. Pt will continue to benefit from assist with meds/finances, pt will assist.    Goal 3: Pt will complete verbal and visual reasoning tasks with 80% acc given mod cues   MOCA:  -abstract reasonin% acc   Goal met. Overall improved reasoning skills.     Goal 4: Pt will complete problem solving and thought organization tasks with 80% acc given mod cues   Thought organization/unscrambling 5 word sentences:  -100% acc indep  Goal met. Overall improved thought organization and problem solving tasks. Goal 5: Pt will complete graded attention tasks (e.g. sustained, selective, alternative, divided) with 80% acc given min cues   Goal met 06/10/22. Goal met 06/10/22. Other areas targeted: Repeat MOCA:  -pt scored a 27/30 compared to initial evaluation score of 14/30. Education:   Edu provided re: results of [facility], progress towards goals, continued use of compensatory strategies      Safety Devices: [x] Call light within reach  [x] Chair alarm activated  [] Bed alarm activated  [] Other: [] Call light within reach  [] Chair alarm activated  [] Bed alarm activated  [] Other:    Assessment: Tx session: Pt pleasant and cooperative, agreeable to participate. Pt completed repeat MOCA scoring a 27/30 today compared to initial evaluation score of 14/30. Pt completed all tx tasks presented today with 100% acc indep. Discussed progress towards goals, ongoing use of compensatory strategies. Also discussed use of life alert 24 assist when pt returns home. Discharge Summary: Pt tolerating IDDSI Level 7 regular solids with thin liquids, meds whole with PO as LRD. Pt has made significant progress towards all cognitive goals within POC. Pt's cognitive status does fluctuate time to time, therefore recommending ongoing ST services for improved consistency and use of compensatory strategies. Pt's wife to assist with meds/finances when pt returns. SLP recommending ongoing ST services. Plan: Continue as per plan of care.       Additional Information:     Barriers toward progress: Confusion and Cognitive deficit  Discharge recommendations:  [] Home independently  [] Home with assistance [x]  24 hour supervision  [] ECF [] Other:  Continued Tx Upon Discharge: ? [x] Yes [] No [] TBD based on progress while on ARU [] Vital Stim indicated [] Other:   Estimated discharge date: 06/18/22    Interventions used this date:  [] Speech/Language Treatment  [] Instruction in HEP [] Group [] Dysphagia Treatment [x] Cognitive Treatment   [] Other: Total Time Breakdown / Charges    Time in Time out Total Time / units   Cognitive Tx 1230 1330 60 min / 4 units    Speech Tx -- -- --   Dysphagia Tx -- -- --       Electronically Signed by     Jm Wild. A CCC-SLP  Speech-Language Pathologist  [id number]

## 2022-06-18 VITALS
OXYGEN SATURATION: 94 % | TEMPERATURE: 98 F | SYSTOLIC BLOOD PRESSURE: 139 MMHG | HEART RATE: 65 BPM | WEIGHT: 207.9 LBS | HEIGHT: 68 IN | DIASTOLIC BLOOD PRESSURE: 59 MMHG | RESPIRATION RATE: 18 BRPM | BODY MASS INDEX: 31.51 KG/M2

## 2022-06-18 LAB — SARS-COV-2, NAAT: NOT DETECTED

## 2022-06-18 PROCEDURE — 6370000000 HC RX 637 (ALT 250 FOR IP): Performed by: STUDENT IN AN ORGANIZED HEALTH CARE EDUCATION/TRAINING PROGRAM

## 2022-06-18 RX ADMIN — ACETAMINOPHEN 325MG 650 MG: 325 TABLET ORAL at 03:34

## 2022-06-18 RX ADMIN — TROSPIUM CHLORIDE 20 MG: 20 TABLET, FILM COATED ORAL at 06:18

## 2022-06-18 RX ADMIN — PANTOPRAZOLE SODIUM 40 MG: 40 TABLET, DELAYED RELEASE ORAL at 06:18

## 2022-06-18 ASSESSMENT — PAIN SCALES - GENERAL: PAINLEVEL_OUTOF10: 6

## 2022-06-18 ASSESSMENT — PAIN DESCRIPTION - DESCRIPTORS: DESCRIPTORS: ACHING

## 2022-06-18 ASSESSMENT — PAIN DESCRIPTION - LOCATION: LOCATION: HEAD

## 2022-06-18 ASSESSMENT — PAIN DESCRIPTION - ORIENTATION: ORIENTATION: ANTERIOR

## 2022-06-18 NOTE — PROGRESS NOTES
[id number] Report called to [facility], spoke with Jessi Guillermo, identified herself as RN Supervisor. No further questions from Jessi Guillermo- gave call back number if pt primary RN has further questions regarding pt. Pt sent with discharge paperwork, prescription sent in sealed envelope along with pt face sheets. Care hand off to [facility].  Ambulance service departed unit at MARLI Botello, RN

## 2022-06-18 NOTE — PLAN OF CARE
Problem: Discharge Planning  Goal: Discharge to home or other facility with appropriate resources  Outcome: Adequate for Discharge  Flowsheets (Taken 6/17/2022 2010)  Discharge to home or other facility with appropriate resources:   Identify barriers to discharge with patient and caregiver   Arrange for needed discharge resources and transportation as appropriate     Problem: Pain  Goal: Verbalizes/displays adequate comfort level or baseline comfort level  Outcome: Adequate for Discharge  Flowsheets (Taken 6/17/2022 2010)  Verbalizes/displays adequate comfort level or baseline comfort level:   Encourage patient to monitor pain and request assistance   Assess pain using appropriate pain scale     Problem: Skin/Tissue Integrity  Goal: Absence of new skin breakdown  Description: 1. Monitor for areas of redness and/or skin breakdown  2. Assess vascular access sites hourly  3. Every 4-6 hours minimum:  Change oxygen saturation probe site  4. Every 4-6 hours:  If on nasal continuous positive airway pressure, respiratory therapy assess nares and determine need for appliance change or resting period.   Outcome: Adequate for Discharge     Problem: Safety - Adult  Goal: Free from fall injury  Outcome: Adequate for Discharge     Problem: Nutrition Deficit:  Goal: Optimize nutritional status  Outcome: Adequate for Discharge

## 2022-06-22 NOTE — DISCHARGE SUMMARY
Physical Medicine & Rehabilitation  Discharge Summary     Patient Identification:  Felice Travis  : 1945  Admit date: 6/3/2022  Discharge date: 2022   Attending provider: Merline Necessary. Malachi Hatchet, MD        Primary care provider: Tee Stoddard MD     Discharge Diagnoses:   Patient Active Problem List   Diagnosis    S/p left hip fracture       History of Present Illness/Acute Hospital Course:  Felice Travis is a 78year old male with a past medical history significant for DM2, HTN, HLD, BERNARDO, COPD, prostate cancer s/p prostatectomy (2019), and GERD who fell down 2 steps and was found to have left hip and femur fracture on 22. He had surgery in [place] and was discharge to SNF in [facility]. He left SNF due to being unhappy with his care there and presented to [facility] on 22 with hip pain. He is admitted to [facility] on 6/3/22 due to functional deficits below his baseline. Inpatient Rehabilitation Course:   Felice Travis is a 78 y.o. male admitted to inpatient rehabilitation on 6/3/2022 with S/p left hip fracture. The patient participated in an aggressive multidisciplinary inpatient rehabilitation program involving 3 hours of therapy per day, at least 5 days per week. He made good progress, but continues to require assistance that his family is not able to provide at home. He would benefit from continued therapies at a skilled nursing level.      Impairments: impaired mobility and ADLs, impaired gait and balance    Medical Management:    Left Hip fracture  - s/p repair in [place]  - WBAT  - PT and OT     Post-operative Pain  - tylenol PRN, oxycodone 2.5 mg PRN  - gabapentin 300 mg BID  - robaxin scheduled     Dry Cough  - possibly due to valsartan as patient previously had side effect of cough with lisinopril  - discontinued valsartan and monitoring symptoms  - endorsing that cough is improving  - follow up with PCP     DM2  - Lantus 22 units nightly  - SSI medium dose correction  - has metformin listed as allergy  - reports he was only taking Lantus at home.      COPD  - adequately oxygenating on room   - wean oxygen for SpO2>90%     BERNARDO  - CPAP qhs     History of HTN  - home regimen: amlodipine 10 mg, losartan 100 mg  - discontinued valsartan due to concerns that this may be causing cough since patient has cough listed as a side effect of lisinopril  - changed amlodipine to nifedipine to be able to up titrate     HLD  - statin     History of Prostate Cancer  - s/p prostatectomy in 2019  - was recently told that his cancer is back and tentative plan to resume chemo  - oxybutynin discontinued due to concerns this was contributing to confusion  - follow up outpatient     Obesity  - BMI 41   - encourage lifestyle modifications       Discharge Functional Status:    Physical therapy:  Bed Mobility:     Sit>supine:     Supine>sit:  Assistance Level: Minimal assistance (HOB elevated)  Transfers:     Sit>stand:  Assistance Level: Requires x 2 assistance,Minimal assistance (min A x2 progressing to min A with RW)  Stand>sit:  Assistance Level: Minimal assistance (with RW)  Bed<>chair  Assistance Level: Minimal assistance,Requires x 2 assistance (min A x1-2 with RW)  Stand Pivot:     Lateral transfer:     Car transfer:  Assistance Level: Requires x 2 assistance,Minimal assistance (min A x2 with RW)  Ambulation:  Surface: Level surface  Device: Rolling walker  Assistance Level: Minimal assistance,Requires x 2 assistance (min A x2 progressing to min A)  Gait Deviations: Slow sarita  Stairs:     Curb: Wheelchair:     Assessment:  Assessment: pt DC to SNF 6/18 d/t continued need for therapy and 24/7 care. pts family has noted desire for pt to be ind. prior to returning home. pt is supv with bed mobilty, SBA with transfers including car transfer, SBA-CGA with gait, and CGA with navigating up to 4 stairs. pt continues to require frequent cuing on safety and awareness of body mechanics.  pt will require RW and standard WC for Management     Assessment:  Assessment: Pt agreeable to OT session. Pt performed functional transfers with SBA/CGA this date and improved balance for ADLs this date. Pt demonstrated LOB in gym requiring mod A to safely return to w/c but CGA/SBA for all standing in bathroom during ADLs. Pt completed bathing with supervision, dressing with supervision/setup, and grooming/eating with mod I while seated. Pt had BM with PT and required assist for thoroughness of bowel hygiene. Pt completed BUE ther ex with good strength for 3# weights, fair coordination to toss unweighted ball with 2# wrist weights on BUEs for 3x20 reps with 75% accuracy to toss ball. Pt required increased time and min VCs for naming items while tossing ball.  Pt completed standing to table top for 4:45 for drill/bolt task with good problem solving and coordination with SBA but LOB backwards when attempting to sit at end of task, requiring mod A to safely reach chair  Activity Tolerance: Patient limited by fatigue,Patient limited by endurance,Patient limited by pain,Patient tolerated treatment well  Discharge Recommendations: Subacute/Skilled Nursing Facility    Speech therapy:    Speech Therapy Diagnosis  Cognitive Diagnosis: severe cognitive impairment  Speech Diagnosis: mild dysarthria         Significant Diagnostics:   Lab Results   Component Value Date    CREATININE 0.8 06/16/2022    BUN 15 06/16/2022     06/16/2022    K 4.8 06/16/2022     06/16/2022    CO2 29 06/16/2022       Lab Results   Component Value Date    WBC 5.2 06/16/2022    HGB 10.3 (L) 06/16/2022    HCT 32.1 (L) 06/16/2022    MCV 77.4 (L) 06/16/2022     06/16/2022       Disposition:  SNF    Services/DME: defer to next level of care    Discharge Condition: Stable    Follow-up:  See after visit summary from hospitalization    Discharge Medications:     Medication List      START taking these medications    atorvastatin 40 MG tablet  Commonly known as: LIPITOR  Take 1 tablet by mouth nightly     benzocaine-menthol 15-3.6 MG lozenge  Commonly known as: CEPACOL SORE THROAT  Take 1 lozenge by mouth every 2 hours as needed for Sore Throat     bisacodyl 10 MG suppository  Commonly known as: DULCOLAX  Place 1 suppository rectally daily as needed for Constipation     cyanocobalamin 1000 MCG tablet  Take 1 tablet by mouth daily     insulin glargine 100 UNIT/ML injection vial  Commonly known as: LANTUS  Inject 22 Units into the skin nightly     * insulin lispro 100 UNIT/ML Soln injection vial  Commonly known as: HUMALOG  Inject 0-12 Units into the skin 3 times daily (with meals)     * insulin lispro 100 UNIT/ML Soln injection vial  Commonly known as: HUMALOG  Inject 0-6 Units into the skin nightly     methocarbamol 500 MG tablet  Commonly known as: ROBAXIN  Take 1 tablet by mouth 4 times daily for 10 days     miconazole 2 % powder  Commonly known as: MICOTIN  Apply topically 2 times daily. NIFEdipine 30 MG extended release tablet  Commonly known as: PROCARDIA XL  Take 2 tablets by mouth daily     pantoprazole 40 MG tablet  Commonly known as: PROTONIX  Take 1 tablet by mouth every morning (before breakfast)     polyethylene glycol 17 g packet  Commonly known as: GLYCOLAX  Take 17 g by mouth daily     sennosides-docusate sodium 8.6-50 MG tablet  Commonly known as: SENOKOT-S  Take 2 tablets by mouth daily     sertraline 100 MG tablet  Commonly known as: ZOLOFT  Take 1 tablet by mouth daily     traZODone 50 MG tablet  Commonly known as: DESYREL  Take 1 tablet by mouth nightly as needed for Sleep     trospium 20 MG tablet  Commonly known as: SANCTURA  Take 1 tablet by mouth 2 times daily (before meals)         * This list has 2 medication(s) that are the same as other medications prescribed for you. Read the directions carefully, and ask your doctor or other care provider to review them with you.             CHANGE how you take these medications    gabapentin 100 MG capsule  Commonly known as: NEURONTIN  Take 3 capsules by mouth in the morning and at bedtime for 30 days. Take two capsules at bedtime. What changed:   · how much to take  · when to take this        CONTINUE taking these medications    aspirin 81 MG tablet     ondansetron 4 MG tablet  Commonly known as: Zofran  Take 1 tablet by mouth every 8 hours as needed for Nausea. STOP taking these medications    citalopram 40 MG tablet  Commonly known as: CELEXA     dicyclomine 10 MG capsule  Commonly known as: Bentyl     losartan 100 MG tablet  Commonly known as: COZAAR     metFORMIN 1000 MG tablet  Commonly known as: GLUCOPHAGE     UNKNOWN TO PATIENT        ASK your doctor about these medications    oxyCODONE 5 MG immediate release tablet  Commonly known as: ROXICODONE  Take 0.5 tablets by mouth every 8 hours as needed for Pain for up to 3 days. Ask about: Should I take this medication? Where to Get Your Medications      Information about where to get these medications is not yet available    Ask your nurse or doctor about these medications  · atorvastatin 40 MG tablet  · benzocaine-menthol 15-3.6 MG lozenge  · bisacodyl 10 MG suppository  · cyanocobalamin 1000 MCG tablet  · gabapentin 100 MG capsule  · insulin glargine 100 UNIT/ML injection vial  · insulin lispro 100 UNIT/ML Soln injection vial  · insulin lispro 100 UNIT/ML Soln injection vial  · methocarbamol 500 MG tablet  · miconazole 2 % powder  · NIFEdipine 30 MG extended release tablet  · oxyCODONE 5 MG immediate release tablet  · pantoprazole 40 MG tablet  · polyethylene glycol 17 g packet  · sennosides-docusate sodium 8.6-50 MG tablet  · sertraline 100 MG tablet  · traZODone 50 MG tablet  · trospium 20 MG tablet           I spent over 35 minutes on this discharge encounter between counseling, coordination of care, and medication reconciliation.     To comply with [facility] alon R.II.4.1:   Discharge order placed in advance to facilitate patients discharge needs.      Steffi Bateman MD

## 2022-07-28 ENCOUNTER — APPOINTMENT (OUTPATIENT)
Dept: GENERAL RADIOLOGY | Age: 77
End: 2022-07-28
Payer: OTHER GOVERNMENT

## 2022-07-28 ENCOUNTER — HOSPITAL ENCOUNTER (EMERGENCY)
Age: 77
Discharge: HOME | End: 2022-07-28
Payer: OTHER GOVERNMENT

## 2022-07-28 VITALS
HEART RATE: 79 BPM | WEIGHT: 200 LBS | DIASTOLIC BLOOD PRESSURE: 57 MMHG | RESPIRATION RATE: 18 BRPM | BODY MASS INDEX: 29.62 KG/M2 | TEMPERATURE: 99 F | OXYGEN SATURATION: 95 % | SYSTOLIC BLOOD PRESSURE: 125 MMHG | HEIGHT: 69 IN

## 2022-07-28 DIAGNOSIS — M25.512 ACUTE PAIN OF LEFT SHOULDER: Primary | ICD-10-CM

## 2022-07-28 DIAGNOSIS — H10.9 CONJUNCTIVITIS OF RIGHT EYE, UNSPECIFIED CONJUNCTIVITIS TYPE: ICD-10-CM

## 2022-07-28 PROCEDURE — 99283 EMERGENCY DEPT VISIT LOW MDM: CPT

## 2022-07-28 PROCEDURE — 73030 X-RAY EXAM OF SHOULDER: CPT

## 2022-07-28 PROCEDURE — 6370000000 HC RX 637 (ALT 250 FOR IP): Performed by: NURSE PRACTITIONER

## 2022-07-28 RX ORDER — ERYTHROMYCIN 5 MG/G
OINTMENT OPHTHALMIC EVERY 8 HOURS
Qty: 3.5 G | Refills: 0 | Status: SHIPPED | OUTPATIENT
Start: 2022-07-28

## 2022-07-28 RX ORDER — ERYTHROMYCIN 5 MG/G
OINTMENT OPHTHALMIC ONCE
Status: COMPLETED | OUTPATIENT
Start: 2022-07-28 | End: 2022-07-28

## 2022-07-28 RX ADMIN — ERYTHROMYCIN: 5 OINTMENT OPHTHALMIC at 10:54

## 2022-07-28 ASSESSMENT — ENCOUNTER SYMPTOMS
SORE THROAT: 0
EYE PAIN: 0
ABDOMINAL PAIN: 0
BACK PAIN: 0
NAUSEA: 0
RHINORRHEA: 0
COUGH: 0
BLOOD IN STOOL: 0
VOMITING: 0
DIARRHEA: 0
SHORTNESS OF BREATH: 0

## 2022-07-28 ASSESSMENT — PAIN DESCRIPTION - LOCATION: LOCATION: SHOULDER

## 2022-07-28 ASSESSMENT — PAIN SCALES - GENERAL
PAINLEVEL_OUTOF10: 3
PAINLEVEL_OUTOF10: 0

## 2022-07-28 ASSESSMENT — PAIN DESCRIPTION - FREQUENCY: FREQUENCY: CONTINUOUS

## 2022-07-28 ASSESSMENT — PAIN DESCRIPTION - ONSET: ONSET: ON-GOING

## 2022-07-28 ASSESSMENT — PAIN - FUNCTIONAL ASSESSMENT
PAIN_FUNCTIONAL_ASSESSMENT: NONE - DENIES PAIN
PAIN_FUNCTIONAL_ASSESSMENT: 0-10

## 2022-07-28 ASSESSMENT — PAIN DESCRIPTION - DESCRIPTORS: DESCRIPTORS: ACHING

## 2022-07-28 ASSESSMENT — PAIN DESCRIPTION - ORIENTATION: ORIENTATION: LEFT

## 2022-07-28 ASSESSMENT — PAIN DESCRIPTION - PAIN TYPE: TYPE: ACUTE PAIN

## 2022-07-28 ASSESSMENT — VISUAL ACUITY: OU: 1

## 2022-07-28 NOTE — ED TRIAGE NOTES
Patient identified as a positive fall risk on the ED triage fall screening. Patient placed in fall precautions which includes:  yellow fall risk bracelet on wrist and yellow socks on feet. Patient instructed on importance of not getting out of bed or ambulating without assistance for safety. Pt verbalized understanding.

## 2022-07-28 NOTE — ED PROVIDER NOTES
[address] [facility]  ED  EMERGENCY DEPARTMENT ENCOUNTER        Pt Name: Inder Calloway  MRN: [id number]  Nevillegfcharissa 1945  Date of evaluation: 7/28/2022  Provider: BRANDEE Dominguez - KRISTEN  PCP: Lisa Barroso MD  Note Started: 11:09 AM EDT      NOVA. I have evaluated this patient. My supervising physician was available for consultation. Triage CHIEF COMPLAINT       Chief Complaint   Patient presents with    Fall     PT was standing from couch thismorning and feet slipped. Pt slid down couch, pain in left shoulder afterwards. Denies LOC, hittig head. Eye Problem     Right eye itching since yesterday         HISTORY OF PRESENT ILLNESS   (Location/Symptom, Timing/Onset, Context/Setting, Quality, Duration, Modifying Factors, Severity)  Note limiting factors. Chief Complaint: Left shoulder pain    Inder Calloway is a 78 y.o. male who presents to the emergency department symptoms of left shoulder pain after he had slid off the couch and attempted to catch himself with the left arm. He reports now pain in the left shoulder. No symptoms of elbow pain or wrist pain. He reports full range of motion of the elbow. And wrist.  Reports full range of motion of the shoulder. No deformity noted. No numbness to tingling. Patient denies any other acute findings. Patient has a secondary complaint of some eye discharge on the right eye. He denies contact use. No injury or trauma. No pain. States that this morning noticeable crusting. Vision loss or vision changes. No discomfort. No orbital pain. Nursing Notes were all reviewed and agreed with or any disagreements were addressed in the HPI. REVIEW OF SYSTEMS    (2-9 systems for level 4, 10 or more for level 5)     Review of Systems   Constitutional:  Negative for chills, diaphoresis and fever. HENT:  Negative for congestion, ear pain, rhinorrhea and sore throat. Eyes:  Negative for pain and visual disturbance.    Respiratory:  Negative for cough and shortness of breath. Cardiovascular:  Negative for chest pain and leg swelling. Gastrointestinal:  Negative for abdominal pain, blood in stool, diarrhea, nausea and vomiting. Genitourinary:  Negative for difficulty urinating, dysuria, flank pain and frequency. Musculoskeletal:  Negative for back pain and neck pain. Skin:  Negative for rash and wound. Neurological:  Negative for dizziness and light-headedness. PAST MEDICAL HISTORY     Past Medical History:   Diagnosis Date    Diabetes mellitus ([facility] [address]     Hypertension        SURGICAL HISTORY   No past surgical history on file. [address]       Discharge Medication List as of 7/28/2022 12:06 PM        CONTINUE these medications which have NOT CHANGED    Details   gabapentin (NEURONTIN) 100 MG capsule Take 3 capsules by mouth in the morning and at bedtime for 30 days. Take two capsules at bedtime. , Disp-180 capsule, R-0NO PRINT      sertraline (ZOLOFT) 100 MG tablet Take 1 tablet by mouth daily, Disp-30 tablet, R-3NO PRINT      traZODone (DESYREL) 50 MG tablet Take 1 tablet by mouth nightly as needed for Sleep, Disp-30 tablet, R-0NO PRINT      insulin glargine (LANTUS) 100 UNIT/ML injection vial Inject 22 Units into the skin nightly, Disp-10 mL, R-3NO PRINT      !! insulin lispro (HUMALOG) 100 UNIT/ML SOLN injection vial Inject 0-12 Units into the skin 3 times daily (with meals), Disp-1 each, R-0NO PRINT      !! insulin lispro (HUMALOG) 100 UNIT/ML SOLN injection vial Inject 0-6 Units into the skin nightly, Disp-1 each, R-0NO PRINT      atorvastatin (LIPITOR) 40 MG tablet Take 1 tablet by mouth nightly, Disp-30 tablet, R-3NO PRINT      NIFEdipine (PROCARDIA XL) 30 MG extended release tablet Take 2 tablets by mouth daily, Disp-30 tablet, R-3NO PRINT      miconazole (MICOTIN) 2 % powder Apply topically 2 times daily. , Disp-45 g, R-1, NO PRINT      vitamin B-12 1000 MCG tablet Take 1 tablet by mouth daily, Disp-30 tablet, R-3NO PRINT sennosides-docusate sodium (SENOKOT-S) 8.6-50 MG tablet Take 2 tablets by mouth daily, Disp-30 tablet, R-0NO PRINT      benzocaine-menthol (CEPACOL SORE THROAT) 15-3.6 MG lozenge Take 1 lozenge by mouth every 2 hours as needed for Sore Throat, Disp-168 lozenge, R-0NO PRINT      pantoprazole (PROTONIX) 40 MG tablet Take 1 tablet by mouth every morning (before breakfast), Disp-30 tablet, R-3NO PRINT      trospium (SANCTURA) 20 MG tablet Take 1 tablet by mouth 2 times daily (before meals), Disp-60 tablet, R-3NO PRINT      aspirin 81 MG tablet Take 81 mg by mouth daily. Historical Med      ondansetron (ZOFRAN) 4 MG tablet Take 1 tablet by mouth every 8 hours as needed for Nausea., Disp-20 tablet, R-0Print       !! - Potential duplicate medications found. Please discuss with provider. ALLERGIES     Cipro [ciprofloxacin hcl], Lisinopril, Metformin and related, and Penicillins    FAMILYHISTORY     No family history on file. SOCIAL HISTORY       Social History     Socioeconomic History    Marital status:    Tobacco Use    Smoking status: Never   Substance and Sexual Activity    Alcohol use: No    Drug use: No       SCREENINGS    [place] Coma Scale  Eye Opening: Spontaneous  Best Verbal Response: Oriented  Best Motor Response: Obeys commands  [place] Coma Scale Score: 15        PHYSICAL EXAM    (up to 7 for level 4, 8 or more for level 5)     ED Triage Vitals [07/28/22 1019]   BP Temp Temp Source Heart Rate Resp SpO2 Height Weight   (!) 125/57 99 °F (37.2 °C) Oral 79 18 95 % 5' 9\" (1.753 m) 200 lb (90.7 kg)       Physical Exam  Vitals and nursing note reviewed. Constitutional:       Appearance: Normal appearance. He is not toxic-appearing or diaphoretic. HENT:      Head: Normocephalic and atraumatic. Nose: Nose normal.   Eyes:      General: Lids are normal. Vision grossly intact. Right eye: Discharge present. No foreign body. Left eye: No foreign body or discharge. Extraocular Movements: Extraocular movements intact. Right eye: Normal extraocular motion. Left eye: Normal extraocular motion. Conjunctiva/sclera:      Right eye: Right conjunctiva is not injected. Exudate present. No hemorrhage. Left eye: Left conjunctiva is not injected. No exudate or hemorrhage. Cardiovascular:      Rate and Rhythm: Normal rate and regular rhythm. Pulses: Normal pulses. Heart sounds: No murmur heard. Pulmonary:      Effort: Pulmonary effort is normal. No respiratory distress. Breath sounds: No wheezing or rhonchi. Musculoskeletal:         General: Normal range of motion. Cervical back: Normal range of motion and neck supple. Right lower leg: No edema. Left lower leg: No edema. Skin:     General: Skin is warm and dry. Neurological:      General: No focal deficit present. Mental Status: He is alert and oriented to person, place, and time. Psychiatric:         Mood and Affect: Mood normal.         Behavior: Behavior normal.       DIAGNOSTIC RESULTS   LABS:    Labs Reviewed - No data to display    When ordered, only abnormal lab results are displayed. All other labs were within normal range or not returned as of this dictation. EKG: When ordered, EKG's are interpreted by the Emergency Department Physician in the absence of a cardiologist.  Please see their note for interpretation of EKG. RADIOLOGY:   Non-plain film images such as CT, Ultrasound and MRI are read by the radiologist. Plain radiographic images are visualized andpreliminarily interpreted by the  ED Provider with the below findings:        Interpretation perthe Radiologist below, if available at the time of this note:    XR SHOULDER LEFT (MIN 2 VIEWS)   Final Result   Degenerative change in the left shoulder with no acute finding. No results found.       PROCEDURES   Unless otherwise noted below, none     Procedures    CRITICAL CARE TIME N/A    CONSULTS:  None      EMERGENCY DEPARTMENT COURSE and DIFFERENTIAL DIAGNOSIS/MDM:   Vitals:    Vitals:    07/28/22 1019   BP: (!) 125/57   Pulse: 79   Resp: 18   Temp: 99 °F (37.2 °C)   TempSrc: Oral   SpO2: 95%   Weight: 200 lb (90.7 kg)   Height: 5' 9\" (1.753 m)       Patient was given thefollowing medications:  Medications   erythromycin [facility]) ophthalmic ointment ( Right Eye Given 7/28/22 1054)         Is this patient to be included in the SEP-1 Core Measure due to severe sepsis or septic shock? No   Exclusion criteria - the patient is NOT to be included for SEP-1 Core Measure due to: Infection is not suspected    Patient is here in the emergency department with symptoms of left shoulder pain after he slid off of the couch causing him to have some mild left strain of the shoulder. X-ray read as negative. He has full range of motion of the left shoulder with some resistance. He is active and passive range of motion are intact. No obvious injury to the arm. Denies any rib pain. No head or neck injury. States that he did not really fall but actually slid off of the couch. Denied any loss of consciousness. No near syncopal spell. He states this was a mechanical type event. He also has a secondary complaint of some drainage from the eye on the right. He states that he is no wear contacts. Denies any recent injury or trauma to the eye. He states he noticed some drainage yesterday and woke up this morning with some crusting. He is requesting antibiotic ointment. No other acute complaints at this time and will be discharged home in good condition. He is to follow-up with family doctor in the next 3 to 5 days for repeat evaluation. Also advised to follow-up with Natali Lozano for the drainage involving the eye. He was provided eye antibiotic ointment for his conjunctivitis. Patient verbalized understanding and agrees to treatment plan discharge.      FINAL IMPRESSION      1. Acute pain of left shoulder    2.  Conjunctivitis of right eye, unspecified conjunctivitis type          DISPOSITION/PLAN   DISPOSITION Decision To Discharge 07/28/2022 11:12:57 AM      PATIENT REFERREDTO:  Margoth Eduardo MD  [address] [address]  [phone]    Schedule an appointment as soon as possible for a visit       [address] [facility] [address]  [phone]    Schedule an appointment as soon as possible for a visit       [facility]  ED  [address] [facility] [address] [facility]  Go to   If symptoms worsen    DISCHARGE MEDICATIONS:  Discharge Medication List as of 7/28/2022 12:06 PM        START taking these medications    Details   erythromycin (ROMYCIN) 5 MG/GM ophthalmic ointment Place into the right eye every 8 hours, Right Eye, EVERY 8 HOURS Starting Thu 7/28/2022, Disp-3.5 g, R-0, Print             DISCONTINUED MEDICATIONS:  Discharge Medication List as of 7/28/2022 12:06 PM                 (Please note that portions ofthis note were completed with a voice recognition program.  Efforts were made to edit the dictations but occasionally words are mis-transcribed.)    BRANDEE Velasquez CNP (electronically signed)             BRANDEE Velasquez CNP  07/31/22 1026

## 2023-03-04 ENCOUNTER — APPOINTMENT (OUTPATIENT)
Dept: GENERAL RADIOLOGY | Age: 78
End: 2023-03-04
Payer: OTHER GOVERNMENT

## 2023-03-04 ENCOUNTER — HOSPITAL ENCOUNTER (EMERGENCY)
Age: 78
Discharge: HOME | End: 2023-03-04
Payer: OTHER GOVERNMENT

## 2023-03-04 ENCOUNTER — APPOINTMENT (OUTPATIENT)
Dept: CT IMAGING | Age: 78
End: 2023-03-04
Payer: OTHER GOVERNMENT

## 2023-03-04 VITALS
HEART RATE: 82 BPM | TEMPERATURE: 98 F | DIASTOLIC BLOOD PRESSURE: 61 MMHG | HEIGHT: 69 IN | RESPIRATION RATE: 18 BRPM | WEIGHT: 210 LBS | SYSTOLIC BLOOD PRESSURE: 122 MMHG | BODY MASS INDEX: 31.1 KG/M2 | OXYGEN SATURATION: 96 %

## 2023-03-04 DIAGNOSIS — R53.1 GENERAL WEAKNESS: Primary | ICD-10-CM
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
## 2023-03-04 DIAGNOSIS — E86.0 DEHYDRATION: ICD-10-CM

## 2023-03-04 LAB
A/G RATIO: 1.3 (ref 1.1–2.2)
ALBUMIN SERPL-MCNC: 4 G/DL (ref 3.4–5)
ALP BLD-CCNC: 147 U/L (ref 40–129)
ALT SERPL-CCNC: 9 U/L (ref 10–40)
ANION GAP SERPL CALCULATED.3IONS-SCNC: 13 MMOL/L (ref 3–16)
AST SERPL-CCNC: 27 U/L (ref 15–37)
BASOPHILS ABSOLUTE: 0 K/UL (ref 0–0.2)
BASOPHILS RELATIVE PERCENT: 0.4 %
BILIRUB SERPL-MCNC: 0.4 MG/DL (ref 0–1)
BILIRUBIN URINE: NEGATIVE
BLOOD, URINE: NEGATIVE
BUN BLDV-MCNC: 22 MG/DL (ref 7–20)
CALCIUM SERPL-MCNC: 9.3 MG/DL (ref 8.3–10.6)
CHLORIDE BLD-SCNC: 100 MMOL/L (ref 99–110)
CLARITY: CLEAR
CO2: 23 MMOL/L (ref 21–32)
COLOR: YELLOW
CREAT SERPL-MCNC: 1 MG/DL (ref 0.8–1.3)
EOSINOPHILS ABSOLUTE: 0.1 K/UL (ref 0–0.6)
EOSINOPHILS RELATIVE PERCENT: 1.5 %
GFR SERPL CREATININE-BSD FRML MDRD: >60 ML/MIN/{1.73_M2}
GLUCOSE BLD-MCNC: 307 MG/DL (ref 70–99)
GLUCOSE URINE: 250 MG/DL
HCT VFR BLD CALC: 36.7 % (ref 40.5–52.5)
HEMOGLOBIN: 11.6 G/DL (ref 13.5–17.5)
KETONES, URINE: NEGATIVE MG/DL
LACTIC ACID: 2.2 MMOL/L (ref 0.4–2)
LEUKOCYTE ESTERASE, URINE: NEGATIVE
LYMPHOCYTES ABSOLUTE: 1.9 K/UL (ref 1–5.1)
LYMPHOCYTES RELATIVE PERCENT: 21.3 %
MCH RBC QN AUTO: 22.7 PG (ref 26–34)
MCHC RBC AUTO-ENTMCNC: 31.6 G/DL (ref 31–36)
MCV RBC AUTO: 71.7 FL (ref 80–100)
MICROSCOPIC EXAMINATION: ABNORMAL
MONOCYTES ABSOLUTE: 0.9 K/UL (ref 0–1.3)
MONOCYTES RELATIVE PERCENT: 10.5 %
NEUTROPHILS ABSOLUTE: 5.9 K/UL (ref 1.7–7.7)
NEUTROPHILS RELATIVE PERCENT: 66.3 %
NITRITE, URINE: NEGATIVE
PDW BLD-RTO: 18.3 % (ref 12.4–15.4)
PH UA: 6 (ref 5–8)
PLATELET # BLD: 194 K/UL (ref 135–450)
PMV BLD AUTO: 8.8 FL (ref 5–10.5)
POTASSIUM SERPL-SCNC: 4.3 MMOL/L (ref 3.5–5.1)
PRO-BNP: 483 PG/ML (ref 0–449)
PROTEIN UA: NEGATIVE MG/DL
RBC # BLD: 5.12 M/UL (ref 4.2–5.9)
SODIUM BLD-SCNC: 136 MMOL/L (ref 136–145)
SPECIFIC GRAVITY UA: 1.02 (ref 1–1.03)
TOTAL PROTEIN: 7.1 G/DL (ref 6.4–8.2)
TROPONIN: <0.01 NG/ML
URINE REFLEX TO CULTURE: ABNORMAL
URINE TYPE: ABNORMAL
UROBILINOGEN, URINE: 1 E.U./DL
WBC # BLD: 8.9 K/UL (ref 4–11)

## 2023-03-04 PROCEDURE — 83880 ASSAY OF NATRIURETIC PEPTIDE: CPT

## 2023-03-04 PROCEDURE — 84484 ASSAY OF TROPONIN QUANT: CPT

## 2023-03-04 PROCEDURE — 70450 CT HEAD/BRAIN W/O DYE: CPT

## 2023-03-04 PROCEDURE — 96361 HYDRATE IV INFUSION ADD-ON: CPT

## 2023-03-04 PROCEDURE — 71045 X-RAY EXAM CHEST 1 VIEW: CPT

## 2023-03-04 PROCEDURE — 85025 COMPLETE CBC W/AUTO DIFF WBC: CPT

## 2023-03-04 PROCEDURE — 80053 COMPREHEN METABOLIC PANEL: CPT

## 2023-03-04 PROCEDURE — 93005 ELECTROCARDIOGRAM TRACING: CPT | Performed by: PHYSICIAN ASSISTANT

## 2023-03-04 PROCEDURE — 81003 URINALYSIS AUTO W/O SCOPE: CPT

## 2023-03-04 PROCEDURE — 96360 HYDRATION IV INFUSION INIT: CPT

## 2023-03-04 PROCEDURE — 2580000003 HC RX 258: Performed by: PHYSICIAN ASSISTANT

## 2023-03-04 PROCEDURE — 83605 ASSAY OF LACTIC ACID: CPT

## 2023-03-04 PROCEDURE — 99285 EMERGENCY DEPT VISIT HI MDM: CPT

## 2023-03-04 RX ORDER — 0.9 % SODIUM CHLORIDE 0.9 %
1000 INTRAVENOUS SOLUTION INTRAVENOUS ONCE
Status: COMPLETED | OUTPATIENT
Start: 2023-03-04 | End: 2023-03-04

## 2023-03-04 RX ADMIN — SODIUM CHLORIDE 1000 ML: 9 INJECTION, SOLUTION INTRAVENOUS at 17:17

## 2023-03-04 ASSESSMENT — PAIN - FUNCTIONAL ASSESSMENT
PAIN_FUNCTIONAL_ASSESSMENT: NONE - DENIES PAIN
PAIN_FUNCTIONAL_ASSESSMENT: NONE - DENIES PAIN

## 2023-03-04 NOTE — ED PROVIDER NOTES
[address] [facility]  ED  Emergency Department Encounter    Patient Name: Sharlene Darnell  MRN: [id number]  [place]: 1945  Date of Evaluation: 3/4/2023  Provider: Gerald Lees MD  Note Started: 4:30 PM EST 3/4/23    CHIEF COMPLAINT  Fatigue (Pt presents to ED via EMS with c/o weakness for the past \"couple of days\" EMS reports BS in the 300s pt also reports urinary frequency. )    SHARED SERVICE VISIT  Evaluated by NOVA. My supervising physician was available for consultation. HISTORY OF PRESENT ILLNESS  Sharlene Darnell is a 70 y.o. male who presents to the ED 1 month history of weakness and fatigue. Patient brought in by squad. States that he has a history of frequent falls. Typically able to get up after. Has had increasing weakness over the past month. Lives at home with wife and uses walker. States that he did have headache today 8 out of 10 when he awoke. Resolved with Tylenol. No associated visual changes or disturbances. No difficulty speaking or swallowing. Some neck stiffness for past 2 days which he attributes to sleeping on it wrong. Has had no numbness, tingling, weakness of extremities. Chronic back pain unchanged. He denies chest pain or shortness of breath. No cough or congestion. No fevers or chills. No abdominal discomfort. No nausea, vomiting or diarrhea. No urinary symptoms. No leg pain or swelling. No recent travel, trauma or surgery. No change in appetite. Was able to get up and ambulate today with assistance. Denies any injury sustained from fall. No other complaints, modifying factors or associated symptoms. Nursing notes reviewed were all reviewed and agreed with or any disagreements were addressed in the HPI.     PMH:  Past Medical History:   Diagnosis Date    Diabetes mellitus ([facility] [address]     Hypertension      Surgical History:  Past Surgical History:   Procedure Laterality Date    ORTHOPEDIC SURGERY      hip surgery     Family History:  History reviewed. No pertinent family history. Social History:  Social History     Socioeconomic History    Marital status:      Spouse name: Not on file    Number of children: Not on file    Years of education: Not on file    Highest education level: Not on file   Occupational History    Not on file   Tobacco Use    Smoking status: Never    Smokeless tobacco: Not on file   Vaping Use    Vaping Use: Never used   Substance and Sexual Activity    Alcohol use: No    Drug use: No    Sexual activity: Not on file   Other Topics Concern    Not on file   Social History Narrative    Not on file     Social Determinants of Health     Financial Resource Strain: Not on file   Food Insecurity: Not on file   Transportation Needs: Not on file   Physical Activity: Not on file   Stress: Not on file   Social Connections: Not on file   Intimate Partner Violence: Not on file   Housing Stability: Not on file     Current Medications:  No current facility-administered medications for this encounter. Current Outpatient Medications   Medication Sig Dispense Refill    erythromycin (ROMYCIN) 5 MG/GM ophthalmic ointment Place into the right eye every 8 hours 3.5 g 0    gabapentin (NEURONTIN) 100 MG capsule Take 3 capsules by mouth in the morning and at bedtime for 30 days. Take two capsules at bedtime.  180 capsule 0    sertraline (ZOLOFT) 100 MG tablet Take 1 tablet by mouth daily 30 tablet 3    traZODone (DESYREL) 50 MG tablet Take 1 tablet by mouth nightly as needed for Sleep 30 tablet 0    insulin glargine (LANTUS) 100 UNIT/ML injection vial Inject 22 Units into the skin nightly 10 mL 3    insulin lispro (HUMALOG) 100 UNIT/ML SOLN injection vial Inject 0-12 Units into the skin 3 times daily (with meals) 1 each 0    insulin lispro (HUMALOG) 100 UNIT/ML SOLN injection vial Inject 0-6 Units into the skin nightly 1 each 0    atorvastatin (LIPITOR) 40 MG tablet Take 1 tablet by mouth nightly 30 tablet 3    NIFEdipine (PROCARDIA XL) 30 MG extended release tablet Take 2 tablets by mouth daily 30 tablet 3    miconazole (MICOTIN) 2 % powder Apply topically 2 times daily. 45 g 1    vitamin B-12 1000 MCG tablet Take 1 tablet by mouth daily 30 tablet 3    sennosides-docusate sodium (SENOKOT-S) 8.6-50 MG tablet Take 2 tablets by mouth daily 30 tablet 0    benzocaine-menthol (CEPACOL SORE THROAT) 15-3.6 MG lozenge Take 1 lozenge by mouth every 2 hours as needed for Sore Throat 168 lozenge 0    pantoprazole (PROTONIX) 40 MG tablet Take 1 tablet by mouth every morning (before breakfast) 30 tablet 3    trospium (SANCTURA) 20 MG tablet Take 1 tablet by mouth 2 times daily (before meals) 60 tablet 3    aspirin 81 MG tablet Take 81 mg by mouth daily. ondansetron (ZOFRAN) 4 MG tablet Take 1 tablet by mouth every 8 hours as needed for Nausea. 20 tablet 0     Allergies: Allergies   Allergen Reactions    Cipro [Ciprofloxacin Hcl] Hives    Lisinopril Other (See Comments)     cough    Metformin And Related Diarrhea    Penicillins      Screenings:     [place] Coma Scale  Eye Opening: Spontaneous  Best Verbal Response: Oriented  Best Motor Response: Obeys commands  [place] Coma Scale Score: 15           CIWA Assessment  BP: 122/61  Heart Rate: 82          REVIEW OF SYSTEMS  Positives and Pertinent Negatives as per HPI. PHYSICAL EXAM  /61   Pulse 82   Temp 98 °F (36.7 °C) (Oral)   Resp 18   Ht 5' 9\" (1.753 m)   Wt 210 lb (95.3 kg)   SpO2 96%   BMI 31.01 kg/m²     GENERAL APPEARANCE: Awake and alert. Cooperative. No acute distress. HEAD: Normocephalic. Atraumatic. No hematomas, lesions, lacerations or abrasions. Negative for greco signs or raccoon's eyes. EYES:  EOM's grossly intact. No periorbital edema or erythema. No proptosis. No globe tenderness. No blood noted to anterior chambers. ENT: Mucous membranes are moist.  No oral lesions or lacerations. No TMJ pain or malocclusion. NECK: Supple. No midline bony tenderness.   No crepitus, deformity, or step-off noted. No swelling, bruising, or color change. HEART: RRR. No chest wall tenderness. No murmurs, rubs, or gallops. LUNGS: CTA B. No wheezing, rhonchi, rales. Respirations unlabored. Good air exchange. ABDOMEN: Soft. Non-distended. Non-tender. No midline pulsatile mass. EXTREMITIES: No peripheral edema. No unilateral calf pain, redness or swelling. Moves all extremities equally. All extremities neurovascularly intact. SKIN: Warm and dry. No acute rashes. NEUROLOGICAL: Alert and oriented. No gross facial drooping. Strength 5/5, sensation intact. EKG  When ordered, EKG's are interpreted by the ED Physician in the absence of a Cardiologist.  Please see their note for interpretation of EKG. LABS  Labs Reviewed   CBC WITH AUTO DIFFERENTIAL - Abnormal; Notable for the following components:       Result Value    Hemoglobin 11.6 (*)     Hematocrit 36.7 (*)     MCV 71.7 (*)     MCH 22.7 (*)     RDW 18.3 (*)     All other components within normal limits   COMPREHENSIVE METABOLIC PANEL - Abnormal; Notable for the following components:    Glucose 307 (*)     BUN 22 (*)     Alkaline Phosphatase 147 (*)     ALT 9 (*)     All other components within normal limits   URINALYSIS WITH REFLEX TO CULTURE - Abnormal; Notable for the following components:    Glucose, Ur 250 (*)     All other components within normal limits   LACTIC ACID - Abnormal; Notable for the following components:    Lactic Acid 2.2 (*)     All other components within normal limits   BRAIN NATRIURETIC PEPTIDE - Abnormal; Notable for the following components:    Pro- (*)     All other components within normal limits   TROPONIN     When ordered, only abnormal lab results are displayed. All other labs were within normal range or not returned as of this dictation.      RADIOLOGY  Non-plain film images such as CT, U/S, and MRI are read by the radiologist.  Plain radiographic images are visualized and preliminarily interpreted by the ED Provider with the below findings:     Stable chest x-ray. Interpretation per the Radiologist below, if available at the time of this note:  CT HEAD WO CONTRAST   Final Result   No acute intracranial abnormality. Diffuse atrophic changes with findings suggesting chronic microvascular   ischemia         XR CHEST PORTABLE   Final Result   Borderline cardiomegaly and mild central pulmonary congestion which is more   apparent. Hypoinflation with probable mild subsegmental linear atelectasis along the   lung bases medially           PROCEDURES  Unless otherwise noted below, none. ED COURSE/DDx/MDM  History obtained from:  Patient    Vitals:  Vitals:    03/04/23 2014 03/04/23 2015 03/04/23 2017 03/04/23 2020   BP: (!) 167/72 (!) 182/70 (!) 156/72 122/61   Pulse: 68 73 73 82   Resp:  22 22 18   Temp:       TempSrc:       SpO2: 92% 96% 95% 96%   Weight:       Height:         Patient received following medications in ED:  Medications   0.9 % sodium chloride bolus (0 mLs IntraVENous Stopped 3/4/23 1956)     Sepsis Consideration:  Is this patient to be included in the SEP-1 Core Measure due to severe sepsis or septic shock? No   Exclusion criteria - the patient is NOT to be included for SEP-1 Core Measure due to: Infection is not suspected    Records Reviewed:   None. Chronic conditions affecting care:   Diabetes and hypertension. has a past medical history of Diabetes mellitus ([address] and Hypertension. Social Determinants:   None    Consults:   None    Reassessment:      Patient's with positive orthostatics. Given 1 L IV fluid bolus. While repeat Orthos do still appear mildly positive patient asymptomatic. Vitals remained stable. Ambulatory without tachycardia or hypoxia and reports that he is feeling much better. MDM/Disposition Considerations:   Briefly Sharlene Darnell presents for weakness and fatigue over the past week.   CBC appears mildly hemoconcentrated according to chart review. Slight elevation in BUN and creatinine at 22 and one-point without evidence for GABI at this time. Blood sugar of 307. Lactate 2.2. Stable portable chest x-ray without evidence for infectious process. Troponin was negative and BNP approximately 480. I do feel that majority of mild lab abnormality secondary to dehydration. CT head without acute pathology noted. Patient was offered admission for weakness, fatigue and falls although declined stating that he feels significantly better after fluids. Discussed at home oral hydration as well as close and continued outpatient PCP follow-up. Will discharge with PT/OT referral for in-home evaluation of need. We have like discussed return precautions and patient and wife in agreement and comfortable at discharge. .    Critical Care Time:   0 Minutes of critical care time spent not including separately billable procedures. DDx:  I estimate there is LOW risk for SUBARACHNOID HEMORRHAGE, MENINGITIS, INTRACRANIAL HEMORRHAGE, SUBDURAL HEMATOMA, OR STROKE, thus I consider the discharge disposition reasonable. Wade Blanco and I have also discussed returning to the Emergency Department immediately if new or worsening symptoms occur. We have discussed the symptoms which are most concerning (e.g., changing or worsening pain, weakness, vomiting, fever) that necessitate immediate return. FINAL IMPRESSION  1. General weakness    2. Dehydration    3.  Fall, initial encounter      Patient referred to:  Brian Elmore [address] [place] [address]  [phone]    Schedule an appointment as soon as possible for a visit       [facility]  ED  [address] [id number] [phone]    If symptoms worsen    Discharge Medications:   New Prescriptions    No medications on file     Discontinued Medications:  Discontinued Medications    No medications on file     Risk management discussed and shared decision making had with patient and/or surrogate. All questions were answered. Patient will follow up with PCP in 2 to 3 days for further evaluation/treatment. All questions answered. Patient will return to ED for new/worsening symptoms. DISPOSITION:  Patient was discharged home in stable condition. (Please note that portions of this note were completed with a voice recognition program.  Efforts were made to edit the dictations but occasionally words are mis-transcribed).           Wallace [place], [place]  03/04/23 212

## 2023-03-04 NOTE — ED TRIAGE NOTES
Pt presents to ED via EMS with c/o weakness for the past \"couple of days\" EMS reports BS in the 300s pt also reports urinary frequency.

## 2023-03-05 LAB
EKG ATRIAL RATE: 70 BPM
EKG DIAGNOSIS: NORMAL
EKG P AXIS: 32 DEGREES
EKG P-R INTERVAL: 166 MS
EKG Q-T INTERVAL: 420 MS
EKG QRS DURATION: 100 MS
EKG QTC CALCULATION (BAZETT): 453 MS
EKG R AXIS: 2 DEGREES
EKG T AXIS: 32 DEGREES
EKG VENTRICULAR RATE: 70 BPM

## 2023-03-05 PROCEDURE — 93010 ELECTROCARDIOGRAM REPORT: CPT | Performed by: INTERNAL MEDICINE

## 2023-03-05 NOTE — ED NOTES
[id number] Lynette Mathis for d/c. Stable, w/ walker, and all personal belongings. No questions at d/c. Left w/ wife.       Rain Pinedo RN  03/04/23 2121

## 2023-03-05 NOTE — ED NOTES
Ambulated pt with portable SpO2 monitor and walker. While ambulating pt had O2 at 96% and HR of ~86. Pt ambulated well with a steady gait while using walker.  Pt did not require any assistance from [address] [place] [address]  03/04/23 2103

## 2023-03-06 NOTE — CARE COORDINATION
Referred to patient for home PT/OT. [facility]erral [facility] to [facility]. PCP is Sasha Romero at [phone]. They are aware of need for PT/OT and will arrange. Unable to reach patient of wife, notified daughter Pedro Schreiber. No other needs at this time.   Electronically signed by LILIBETH Mccord on 3/6/2023 at 2:08 PM

## 2023-07-26 ENCOUNTER — HOSPITAL ENCOUNTER (INPATIENT)
Age: 78
LOS: 1 days | Discharge: NURSING, REHAB OR LONG-TERM CARE | End: 2023-07-28
Attending: STUDENT IN AN ORGANIZED HEALTH CARE EDUCATION/TRAINING PROGRAM | Admitting: INTERNAL MEDICINE
Payer: OTHER GOVERNMENT

## 2023-07-26 DIAGNOSIS — R29.6 MULTIPLE FALLS: ICD-10-CM

## 2023-07-26 DIAGNOSIS — S42.032A TRAUMATIC CLOSED FRACTURE OF DISTAL CLAVICLE WITH MINIMAL DISPLACEMENT, LEFT, INITIAL ENCOUNTER: Primary | ICD-10-CM

## 2023-07-26 DIAGNOSIS — S42.032A: ICD-10-CM

## 2023-07-26 DIAGNOSIS — S42.002A FRACTURE OF UNSPECIFIED PART OF LEFT CLAVICLE, INITIAL ENCOUNTER FOR CLOSED FRACTURE: ICD-10-CM

## 2023-07-26 DIAGNOSIS — R26.81 GAIT INSTABILITY: ICD-10-CM

## 2023-07-26 DIAGNOSIS — Z79.01 ON CONTINUOUS ORAL ANTICOAGULATION: ICD-10-CM
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
## 2023-07-26 PROCEDURE — 99285 EMERGENCY DEPT VISIT HI MDM: CPT

## 2023-07-26 ASSESSMENT — PAIN SCALES - GENERAL: PAINLEVEL_OUTOF10: 3

## 2023-07-26 ASSESSMENT — PAIN - FUNCTIONAL ASSESSMENT: PAIN_FUNCTIONAL_ASSESSMENT: 0-10

## 2023-07-27 ENCOUNTER — APPOINTMENT (OUTPATIENT)
Dept: GENERAL RADIOLOGY | Age: 78
End: 2023-07-27
Payer: OTHER GOVERNMENT

## 2023-07-27 ENCOUNTER — APPOINTMENT (OUTPATIENT)
Dept: CT IMAGING | Age: 78
End: 2023-07-27
Payer: OTHER GOVERNMENT

## 2023-07-27 PROBLEM — I10 ESSENTIAL (PRIMARY) HYPERTENSION: Status: ACTIVE | Noted: 2023-07-27

## 2023-07-27 PROBLEM — G47.33 OSA ON CPAP: Status: ACTIVE | Noted: 2022-05-29

## 2023-07-27 PROBLEM — E11.42 DIABETIC POLYNEUROPATHY ASSOCIATED WITH TYPE 2 DIABETES MELLITUS (HCC): Status: ACTIVE | Noted: 2022-05-29

## 2023-07-27 PROBLEM — E78.2 MIXED HYPERLIPIDEMIA: Status: ACTIVE | Noted: 2022-05-29

## 2023-07-27 PROBLEM — F32.4 MAJOR DEPRESSION IN PARTIAL REMISSION (HCC): Status: ACTIVE | Noted: 2023-07-27

## 2023-07-27 PROBLEM — F09 MILD COGNITIVE DISORDER: Status: ACTIVE | Noted: 2023-07-27

## 2023-07-27 PROBLEM — S42.032A: Status: ACTIVE | Noted: 2023-07-27

## 2023-07-27 PROBLEM — F43.12 CHRONIC POST-TRAUMATIC STRESS DISORDER (PTSD) AFTER MILITARY COMBAT: Status: ACTIVE | Noted: 2023-07-27

## 2023-07-27 PROBLEM — R53.1 WEAKNESS: Status: ACTIVE | Noted: 2023-07-27

## 2023-07-27 PROBLEM — E11.40 TYPE 2 DIABETES MELLITUS WITH DIABETIC NEUROPATHY, UNSPECIFIED (HCC): Status: ACTIVE | Noted: 2023-07-27

## 2023-07-27 PROBLEM — Z99.89 OSA ON CPAP: Status: ACTIVE | Noted: 2022-05-29

## 2023-07-27 PROBLEM — C80.1 CANCER (HCC): Status: ACTIVE | Noted: 2023-07-27

## 2023-07-27 PROBLEM — C61 PRIMARY MALIGNANT NEOPLASM OF PROSTATE (HCC): Status: ACTIVE | Noted: 2023-07-27

## 2023-07-27 PROBLEM — S42.002A FRACTURE OF UNSPECIFIED PART OF LEFT CLAVICLE, INITIAL ENCOUNTER FOR CLOSED FRACTURE: Status: ACTIVE | Noted: 2023-07-27

## 2023-07-27 PROBLEM — R26.81 UNSTEADINESS ON FEET: Status: ACTIVE | Noted: 2023-07-27

## 2023-07-27 LAB
ANION GAP SERPL CALCULATED.3IONS-SCNC: 10 MMOL/L (ref 3–16)
BASOPHILS # BLD: 0.1 K/UL (ref 0–0.2)
BASOPHILS NFR BLD: 0.6 %
BUN SERPL-MCNC: 16 MG/DL (ref 7–20)
CALCIUM SERPL-MCNC: 9.2 MG/DL (ref 8.3–10.6)
CHLORIDE SERPL-SCNC: 99 MMOL/L (ref 99–110)
CO2 SERPL-SCNC: 25 MMOL/L (ref 21–32)
CREAT SERPL-MCNC: 0.9 MG/DL (ref 0.8–1.3)
DEPRECATED RDW RBC AUTO: 21.8 % (ref 12.4–15.4)
EOSINOPHIL # BLD: 0.3 K/UL (ref 0–0.6)
EOSINOPHIL NFR BLD: 3.4 %
GFR SERPLBLD CREATININE-BSD FMLA CKD-EPI: >60 ML/MIN/{1.73_M2}
GLUCOSE BLD-MCNC: 171 MG/DL (ref 70–99)
GLUCOSE BLD-MCNC: 176 MG/DL (ref 70–99)
GLUCOSE BLD-MCNC: 201 MG/DL (ref 70–99)
GLUCOSE BLD-MCNC: 208 MG/DL (ref 70–99)
GLUCOSE BLD-MCNC: 242 MG/DL (ref 70–99)
GLUCOSE SERPL-MCNC: 209 MG/DL (ref 70–99)
HCT VFR BLD AUTO: 36.2 % (ref 40.5–52.5)
HGB BLD-MCNC: 12.2 G/DL (ref 13.5–17.5)
LYMPHOCYTES # BLD: 1.9 K/UL (ref 1–5.1)
LYMPHOCYTES NFR BLD: 19.4 %
MCH RBC QN AUTO: 26 PG (ref 26–34)
MCHC RBC AUTO-ENTMCNC: 33.7 G/DL (ref 31–36)
MCV RBC AUTO: 77.2 FL (ref 80–100)
MONOCYTES # BLD: 0.8 K/UL (ref 0–1.3)
MONOCYTES NFR BLD: 7.9 %
NEUTROPHILS # BLD: 6.6 K/UL (ref 1.7–7.7)
NEUTROPHILS NFR BLD: 68.7 %
PERFORMED ON: ABNORMAL
PLATELET # BLD AUTO: 171 K/UL (ref 135–450)
PMV BLD AUTO: 9.3 FL (ref 5–10.5)
POTASSIUM SERPL-SCNC: 4 MMOL/L (ref 3.5–5.1)
RBC # BLD AUTO: 4.68 M/UL (ref 4.2–5.9)
SODIUM SERPL-SCNC: 134 MMOL/L (ref 136–145)
WBC # BLD AUTO: 9.7 K/UL (ref 4–11)

## 2023-07-27 PROCEDURE — 2580000003 HC RX 258: Performed by: NURSE PRACTITIONER

## 2023-07-27 PROCEDURE — 73030 X-RAY EXAM OF SHOULDER: CPT

## 2023-07-27 PROCEDURE — 72125 CT NECK SPINE W/O DYE: CPT

## 2023-07-27 PROCEDURE — 97530 THERAPEUTIC ACTIVITIES: CPT

## 2023-07-27 PROCEDURE — 97116 GAIT TRAINING THERAPY: CPT

## 2023-07-27 PROCEDURE — 97166 OT EVAL MOD COMPLEX 45 MIN: CPT

## 2023-07-27 PROCEDURE — 72192 CT PELVIS W/O DYE: CPT

## 2023-07-27 PROCEDURE — 85025 COMPLETE CBC W/AUTO DIFF WBC: CPT

## 2023-07-27 PROCEDURE — 6360000002 HC RX W HCPCS: Performed by: NURSE PRACTITIONER

## 2023-07-27 PROCEDURE — 71045 X-RAY EXAM CHEST 1 VIEW: CPT

## 2023-07-27 PROCEDURE — 97162 PT EVAL MOD COMPLEX 30 MIN: CPT

## 2023-07-27 PROCEDURE — 70450 CT HEAD/BRAIN W/O DYE: CPT

## 2023-07-27 PROCEDURE — 80048 BASIC METABOLIC PNL TOTAL CA: CPT

## 2023-07-27 PROCEDURE — 1200000000 HC SEMI PRIVATE

## 2023-07-27 PROCEDURE — 6370000000 HC RX 637 (ALT 250 FOR IP): Performed by: INTERNAL MEDICINE

## 2023-07-27 PROCEDURE — 6370000000 HC RX 637 (ALT 250 FOR IP): Performed by: NURSE PRACTITIONER

## 2023-07-27 PROCEDURE — 99252 IP/OBS CONSLTJ NEW/EST SF 35: CPT | Performed by: SPECIALIST/TECHNOLOGIST

## 2023-07-27 PROCEDURE — 6370000000 HC RX 637 (ALT 250 FOR IP): Performed by: STUDENT IN AN ORGANIZED HEALTH CARE EDUCATION/TRAINING PROGRAM

## 2023-07-27 RX ORDER — ATORVASTATIN CALCIUM 40 MG/1
40 TABLET, FILM COATED ORAL NIGHTLY
Status: DISCONTINUED | OUTPATIENT
Start: 2023-07-27 | End: 2023-07-28 | Stop reason: HOSPADM

## 2023-07-27 RX ORDER — GABAPENTIN 100 MG/1
100 CAPSULE ORAL 2 TIMES DAILY
COMMUNITY

## 2023-07-27 RX ORDER — PANTOPRAZOLE SODIUM 40 MG/1
40 TABLET, DELAYED RELEASE ORAL
Status: DISCONTINUED | OUTPATIENT
Start: 2023-07-27 | End: 2023-07-28 | Stop reason: HOSPADM

## 2023-07-27 RX ORDER — ENOXAPARIN SODIUM 100 MG/ML
40 INJECTION SUBCUTANEOUS DAILY
Status: DISCONTINUED | OUTPATIENT
Start: 2023-07-27 | End: 2023-07-28 | Stop reason: HOSPADM

## 2023-07-27 RX ORDER — HYDROCODONE BITARTRATE AND ACETAMINOPHEN 5; 325 MG/1; MG/1
1 TABLET ORAL EVERY 6 HOURS PRN
Status: DISCONTINUED | OUTPATIENT
Start: 2023-07-27 | End: 2023-07-28 | Stop reason: HOSPADM

## 2023-07-27 RX ORDER — ACETAMINOPHEN 325 MG/1
650 TABLET ORAL EVERY 6 HOURS PRN
Status: DISCONTINUED | OUTPATIENT
Start: 2023-07-27 | End: 2023-07-28 | Stop reason: HOSPADM

## 2023-07-27 RX ORDER — DEXTROSE MONOHYDRATE 100 MG/ML
INJECTION, SOLUTION INTRAVENOUS CONTINUOUS PRN
Status: DISCONTINUED | OUTPATIENT
Start: 2023-07-27 | End: 2023-07-28 | Stop reason: HOSPADM

## 2023-07-27 RX ORDER — SODIUM CHLORIDE 0.9 % (FLUSH) 0.9 %
10 SYRINGE (ML) INJECTION PRN
Status: DISCONTINUED | OUTPATIENT
Start: 2023-07-27 | End: 2023-07-28 | Stop reason: HOSPADM

## 2023-07-27 RX ORDER — ASPIRIN 81 MG/1
81 TABLET ORAL DAILY
Status: DISCONTINUED | OUTPATIENT
Start: 2023-07-27 | End: 2023-07-28 | Stop reason: HOSPADM

## 2023-07-27 RX ORDER — SODIUM CHLORIDE 9 MG/ML
INJECTION, SOLUTION INTRAVENOUS PRN
Status: DISCONTINUED | OUTPATIENT
Start: 2023-07-27 | End: 2023-07-28 | Stop reason: HOSPADM

## 2023-07-27 RX ORDER — NIFEDIPINE 30 MG/1
60 TABLET, EXTENDED RELEASE ORAL DAILY
Status: DISCONTINUED | OUTPATIENT
Start: 2023-07-27 | End: 2023-07-28

## 2023-07-27 RX ORDER — INSULIN LISPRO 100 [IU]/ML
0-4 INJECTION, SOLUTION INTRAVENOUS; SUBCUTANEOUS NIGHTLY
Status: DISCONTINUED | OUTPATIENT
Start: 2023-07-27 | End: 2023-07-28 | Stop reason: HOSPADM

## 2023-07-27 RX ORDER — ONDANSETRON 4 MG/1
4 TABLET, ORALLY DISINTEGRATING ORAL EVERY 8 HOURS PRN
Status: DISCONTINUED | OUTPATIENT
Start: 2023-07-27 | End: 2023-07-28 | Stop reason: HOSPADM

## 2023-07-27 RX ORDER — GABAPENTIN 100 MG/1
100 CAPSULE ORAL 2 TIMES DAILY
Status: DISCONTINUED | OUTPATIENT
Start: 2023-07-27 | End: 2023-07-28 | Stop reason: HOSPADM

## 2023-07-27 RX ORDER — ACETAMINOPHEN 650 MG/1
650 SUPPOSITORY RECTAL EVERY 6 HOURS PRN
Status: DISCONTINUED | OUTPATIENT
Start: 2023-07-27 | End: 2023-07-28 | Stop reason: HOSPADM

## 2023-07-27 RX ORDER — ACETAMINOPHEN 500 MG
1000 TABLET ORAL ONCE
Status: COMPLETED | OUTPATIENT
Start: 2023-07-27 | End: 2023-07-27

## 2023-07-27 RX ORDER — ONDANSETRON 2 MG/ML
4 INJECTION INTRAMUSCULAR; INTRAVENOUS EVERY 6 HOURS PRN
Status: DISCONTINUED | OUTPATIENT
Start: 2023-07-27 | End: 2023-07-28 | Stop reason: HOSPADM

## 2023-07-27 RX ORDER — INSULIN LISPRO 100 [IU]/ML
0-4 INJECTION, SOLUTION INTRAVENOUS; SUBCUTANEOUS
Status: DISCONTINUED | OUTPATIENT
Start: 2023-07-27 | End: 2023-07-28 | Stop reason: HOSPADM

## 2023-07-27 RX ORDER — TROSPIUM CHLORIDE 20 MG/1
20 TABLET, FILM COATED ORAL
Status: DISCONTINUED | OUTPATIENT
Start: 2023-07-27 | End: 2023-07-28 | Stop reason: HOSPADM

## 2023-07-27 RX ORDER — INSULIN GLARGINE 100 [IU]/ML
22 INJECTION, SOLUTION SUBCUTANEOUS NIGHTLY
Status: DISCONTINUED | OUTPATIENT
Start: 2023-07-27 | End: 2023-07-28 | Stop reason: HOSPADM

## 2023-07-27 RX ORDER — POLYETHYLENE GLYCOL 3350 17 G/17G
17 POWDER, FOR SOLUTION ORAL DAILY PRN
Status: DISCONTINUED | OUTPATIENT
Start: 2023-07-27 | End: 2023-07-28 | Stop reason: HOSPADM

## 2023-07-27 RX ORDER — SODIUM CHLORIDE 0.9 % (FLUSH) 0.9 %
5-40 SYRINGE (ML) INJECTION EVERY 12 HOURS SCHEDULED
Status: DISCONTINUED | OUTPATIENT
Start: 2023-07-27 | End: 2023-07-28 | Stop reason: HOSPADM

## 2023-07-27 RX ADMIN — ASPIRIN 81 MG: 81 TABLET, COATED ORAL at 07:56

## 2023-07-27 RX ADMIN — SERTRALINE 100 MG: 50 TABLET, FILM COATED ORAL at 07:56

## 2023-07-27 RX ADMIN — HYDROCODONE BITARTRATE AND ACETAMINOPHEN 1 TABLET: 5; 325 TABLET ORAL at 14:26

## 2023-07-27 RX ADMIN — TROSPIUM CHLORIDE 20 MG: 20 TABLET, FILM COATED ORAL at 06:32

## 2023-07-27 RX ADMIN — INSULIN LISPRO 1 UNITS: 100 INJECTION, SOLUTION INTRAVENOUS; SUBCUTANEOUS at 08:09

## 2023-07-27 RX ADMIN — Medication 10 ML: at 20:17

## 2023-07-27 RX ADMIN — Medication 10 ML: at 07:58

## 2023-07-27 RX ADMIN — PANTOPRAZOLE SODIUM 40 MG: 40 TABLET, DELAYED RELEASE ORAL at 06:32

## 2023-07-27 RX ADMIN — ENOXAPARIN SODIUM 40 MG: 100 INJECTION SUBCUTANEOUS at 07:56

## 2023-07-27 RX ADMIN — ACETAMINOPHEN 1000 MG: 500 TABLET ORAL at 00:28

## 2023-07-27 RX ADMIN — GABAPENTIN 100 MG: 100 CAPSULE ORAL at 07:57

## 2023-07-27 RX ADMIN — HYDROCODONE BITARTRATE AND ACETAMINOPHEN 1 TABLET: 5; 325 TABLET ORAL at 06:34

## 2023-07-27 RX ADMIN — GABAPENTIN 100 MG: 100 CAPSULE ORAL at 20:17

## 2023-07-27 RX ADMIN — TROSPIUM CHLORIDE 20 MG: 20 TABLET, FILM COATED ORAL at 17:44

## 2023-07-27 RX ADMIN — ATORVASTATIN CALCIUM 40 MG: 40 TABLET, FILM COATED ORAL at 20:17

## 2023-07-27 RX ADMIN — INSULIN GLARGINE 22 UNITS: 100 INJECTION, SOLUTION SUBCUTANEOUS at 20:17

## 2023-07-27 ASSESSMENT — PAIN DESCRIPTION - LOCATION
LOCATION: SHOULDER
LOCATION: OTHER (COMMENT)

## 2023-07-27 ASSESSMENT — PAIN SCALES - GENERAL
PAINLEVEL_OUTOF10: 1
PAINLEVEL_OUTOF10: 4
PAINLEVEL_OUTOF10: 4
PAINLEVEL_OUTOF10: 7
PAINLEVEL_OUTOF10: 3
PAINLEVEL_OUTOF10: 5
PAINLEVEL_OUTOF10: 4

## 2023-07-27 ASSESSMENT — PAIN DESCRIPTION - ORIENTATION
ORIENTATION: LEFT

## 2023-07-27 ASSESSMENT — PAIN DESCRIPTION - DESCRIPTORS: DESCRIPTORS: ACHING

## 2023-07-27 NOTE — PROGRESS NOTES
4 Eyes Skin Assessment     NAME:  Elvis Guerrero  YOB: 1945  MEDICAL RECORD NUMBER:  [id number]    The patient is being assessed for  Admission    I agree that at least one RN has performed a thorough Head to Toe Skin Assessment on the patient. ALL assessment sites listed below have been assessed. Areas assessed by both nurses:    Head, Face, Ears, Shoulders, Back, Chest, Arms, Elbows, Hands, Sacrum. Buttock, Coccyx, Ischium, Legs. Feet and Heels, and Under Medical Devices         Does the Patient have a Wound?  No noted wound(s)       Tony Prevention initiated by RN: No  Wound Care Orders initiated by RN: No    Pressure Injury (Stage 3,4, Unstageable, DTI, NWPT, and Complex wounds) if present, place Wound referral order by RN under : No    New Ostomies, if present place, Ostomy referral order under : No     Nurse 1 eSignature: Electronically signed by Gio Tavarez RN on 7/27/23 at 6:43 AM EDT    **SHARE this note so that the co-signing nurse can place an eSignature**    Nurse 2 eSignature: {Esignature:[id number]}

## 2023-07-27 NOTE — DISCHARGE INSTRUCTIONS
Left collarbone fracture  Nonoperative management of the distal clavicle fracture. Recommend nonweightbearing to the left upper extremity, limited range of motion. No lifting heavier than a coffee cup for a few weeks. Maintain sling for comfort. Okay to come out of sling several times a day for gentle elbow, wrist, hand range of motion to prevent stiffness. Follow-up with Dr. Pranay Vargas in the outpatient setting in 2 weeks for repeat x-rays and to evaluate need for physical therapy.   Call [facility] at [phone] to schedule an appointment or with any questions

## 2023-07-27 NOTE — PROGRESS NOTES
DPT    If pt is unable to be seen after this session, please let this note serve as discharge summary. Please see case management note for discharge disposition. Thank you.

## 2023-07-27 NOTE — CARE COORDINATION
Case Management Assessment  Initial Evaluation    Date/Time of Evaluation: 7/27/2023 4:00 PM  Assessment Completed by: Daxa Ramsey RN    If patient is discharged prior to next notation, then this note serves as note for discharge by case management. Patient Name: Alysia Cuevas                   YOB: 1945  Diagnosis: Gait instability [R26.81]  Multiple falls [R29.6]  Fall, initial encounter [W19. XXXA]  Traumatic closed fracture of acromial end of clavicle with minimal displacement, left, initial encounter [S42.032A]  Traumatic closed fracture of distal clavicle with minimal displacement, left, initial encounter [facility]  On continuous oral anticoagulation [Z79.01]  Fracture of unspecified part of left clavicle, initial encounter for closed fracture [S42.002A]                   Date / Time: 7/26/2023 11:46 PM    Patient Admission Status: Inpatient   Readmission Risk (Low < 19, Mod (19-27), High > 27): Readmission Risk Score: 12    Current PCP: Brea Graves MD  PCP verified by CM? Yes    Chart Reviewed: Yes      History Provided by: Significant Other, Patient  Patient Orientation: Alert and Oriented, Other (see comment) (int confusion)    Patient Cognition: Other (see comment) (cognitive disorder)    Hospitalization in the last 30 days (Readmission):  No    If yes, Readmission Assessment in CM Navigator will be completed.     Advance Directives:      Code Status: Full Code   Patient's Primary Decision Maker is: Named in [facility]    Primary Decision MakerFcornell Colvin  Child - [phone]    Secondary Decision Maker: Eliza Cowden - Child - [phone]    Discharge Planning:    Patient lives with: Spouse/Significant Other Type of Home: Apartment  Primary Care Giver: Spouse  Patient Support Systems include: Spouse/Significant Other, Children   Current Financial resources: [employer] ([facility])  Current community resources: None  Current services prior to admission: C-pap

## 2023-07-27 NOTE — DISCHARGE INSTR - COC
Continuity of Care Form    Patient Name: Fadia Valdez   :  1945  MRN:  [id number]    Admit date:  2023  Discharge date:        Code Status Order: Full Code   Advance Directives:     Admitting Physician:  Cierra Haq MD  PCP: Arash Ward MD    Discharging Nurse: Cesilia Jeffery [address] [facility] Unit/Room#: 0885/7109-14  Discharging Unit Phone Number: [phone]    Emergency Contact:   Extended Emergency Contact Information  Primary Emergency Contact: Dinora Young  Address: [address] [place] [address], [address] [place] [address],[address] [facility] [place] [address] Khari Haider Phone: [phone]  Relation: Spouse  Secondary Emergency Contact: Pop Antoine Phone: [phone]  Relation: Child    Past Surgical History:  Past Surgical History:   Procedure Laterality Date    FOREARM SURGERY      wires from elbow to wrist    ORTHOPEDIC SURGERY      hip surgery    PROSTATECTOMY         Immunization History: There is no immunization history on file for this patient.     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 F43.12    Diabetic polyneuropathy associated with type 2 diabetes mellitus ([address]) E11.42    Essential (primary) hypertension I10    Major depression in partial remission ([address]) F32.4    Mild cognitive disorder F09    Mixed hyperlipidemia E78.2    BERNARDO on CPAP G47.33, Z99.89    Type 2 diabetes mellitus with diabetic neuropathy, unspecified (HCC) E11.40    Unsteadiness on feet R26.81    Weakness R53.1    Cancer (HCC) C80.1       Isolation/Infection:   Isolation            No Isolation          Patient Infection Status Assessment Score:  Readmission Risk              Risk of Unplanned Readmission:  18           Discharging to [facility]   [facility]   [place] [address]   [phone]     / signature: Electronically signed by Alexa White RN on 7/28/23 at 3:15 PM EDT    PHYSICIAN SECTION    Prognosis: Good    Condition at Discharge: Stable    Rehab Potential (if transferring to Rehab): Fair    Recommended Follow-up, Labs or Other Treatments After Discharge:    Left collarbone fracture  Nonoperative management of the distal clavicle fracture. Recommend nonweightbearing to the left upper extremity, limited range of motion. No lifting heavier than a coffee cup for a few weeks. Maintain sling for comfort. Okay to come out of sling several times a day for gentle elbow, wrist, hand range of motion to prevent stiffness. Follow-up with Dr. Manohar Graves in the outpatient setting in 2 weeks for repeat x-rays and to evaluate need for physical therapy. Call [address] [place] [address] orthopedics at [phone] to schedule an appointment or with any questions               Physician Certification: I certify the above information and transfer of Kusum Bates  is necessary for the continuing treatment of the diagnosis listed and that he requires [address] for greater 30 days.      Update Admission H&P: No change in H&P    PHYSICIAN SIGNATURE:  Electronically signed by Drake Moise MD on 7/66/54 at 12:45 PM EDT

## 2023-07-27 NOTE — PROGRESS NOTES
Occupational Therapy  Facility/Department: [place] [address] - MED SURG/ORTHO  Occupational Therapy Initial Assessment    Name: Alysia Cuevas  : 1945  MRN: [id number]  Date of Service: 2023    Discharge Recommendations:  Subacute/Skilled Nursing Facility  OT Equipment Recommendations  Equipment Needed: No  Other: defer to next level of care    Therapy discharge recommendations take into account each patient's current medical complexities and are subject to input/oversight from the patient's healthcare team.     Barriers to Home Discharge:   [] Steps to access home entry or bed/bath:   [x] Unable to transfer, ambulate, or propel wheelchair household distances without assist   [x] Limited available assist at home upon discharge    [x] Patient or family requests d/c to post-acute facility    [] Poor cognition/safety awareness for d/c to home alone    [] Unable to maintain ordered weight bearing status    [] Patient with salient signs of long-standing immobility   [x] Decreased independence with ADLs   [x] Increased risk for falls   [] Other:    If pt is unable to be seen after this session, please let this note serve as discharge summary. Please see case management note for discharge disposition. Thank you. Patient Diagnosis(es): The primary encounter diagnosis was Traumatic closed fracture of distal clavicle with minimal displacement, left, initial encounter. Diagnoses of Fall, initial encounter, On continuous oral anticoagulation, Multiple falls, and Gait instability were also pertinent to this visit. Past Medical History:  has a past medical history of Cancer ([address]), Diabetes mellitus ([address]), Frequent falls, and Hypertension. Past Surgical History:  has a past surgical history that includes orthopedic surgery; Prostatectomy; and Forearm surgery. Assessment   Performance deficits / Impairments: Decreased functional mobility ; Decreased ADL status; Decreased strength;Decreased safe awareness;Decreased assistance (w/ quad cane)  Transfer Comments: Pt ambulated to/from bathroom with quad cane. To bathroom, pt required min-mod A for ambulation with quad cane to correct LOB, verbal cues required for sequencing. From bathroom to chair, pt ambulated with quad cane with CGA, verbal cues provided for sequencing. Vision  Vision: Impaired  Vision Exceptions: Wears glasses for reading  Hearing  Hearing: Exceptions to [facility]  Hearing Exceptions: Bilateral hearing aid (not present)  Cognition  Overall Cognitive Status: Exceptions  Arousal/Alertness: Appropriate responses to stimuli  Following Commands: Follows one step commands consistently; Follows multistep commands consistently  Attention Span: Appears intact  Memory: Decreased recall of recent events  Safety Judgement: Decreased awareness of need for assistance;Decreased awareness of need for safety  Problem Solving: Assistance required to generate solutions;Assistance required to identify errors made  Insights: Decreased awareness of deficits  Initiation: Requires cues for some  Sequencing: Requires cues for some  Orientation  Overall Orientation Status: Within Functional Limits  Orientation Level: Oriented X4                  Education Given To: Patient  Education Provided: Role of Therapy;Plan of Care;Precautions;Transfer Training; Fall Prevention Strategies  Education Provided Comments: role of OT, importance of nurse call light, transfer trianing, plan of care  Education Method: Verbal  Barriers to Learning: None  Education Outcome: Verbalized understanding;Continued education needed  Disease Specific Education: Pt educated on importance of OOB mobility, prevention of complications of bedrest, and general safety during hospitalization.  Pt verbalized understanding      AM-PAC Score        AM-PAC Inpatient Daily Activity Raw Score: 15 (07/27/23 1047)  AM-PAC Inpatient ADL T-Scale Score : 34.69 (07/27/23 1047)  ADL Inpatient CMS 0-100% Score: 56.46 (07/27/23 1047)  ADL

## 2023-07-27 NOTE — CARE COORDINATION
[facility]   After review, this patient is felt to be:       []  Appropriate for Acute Inpatient Rehab    []  Appropriate for Acute Inpatient Rehab Pending Insurance Authorization    [x]  Not appropriate for Acute Inpatient Rehab lacks medical necessity    []  Referral received and ARU reviewing patient; Evaluation ongoing. Discussed referral with Dr. Robert Horn  Discussed with Safia Rowland. Thank you for the referral.   Karen Loredo RN

## 2023-07-27 NOTE — PROGRESS NOTES
in the ligamentum flavum at the C5-6 level noted. SOFT TISSUES: There is no prevertebral soft tissue swelling. Chronic findings in the brain without acute CT abnormality identified. No acute osseous abnormality identified in the cervical spine. CT CERVICAL SPINE WO CONTRAST    Result Date: 7/27/2023  EXAMINATION: CT OF THE HEAD WITHOUT CONTRAST; CT OF THE CERVICAL SPINE WITHOUT CONTRAST 7/27/2023 12:02 am; 7/27/2023 12:03 am TECHNIQUE: CT of the head was performed without the administration of intravenous contrast. Automated exposure control, iterative reconstruction, and/or weight based adjustment of the mA/kV was utilized to reduce the radiation dose to as low as reasonably achievable.; CT of the cervical spine was performed without the administration of intravenous contrast. Multiplanar reformatted images are provided for review. Automated exposure control, iterative reconstruction, and/or weight based adjustment of the mA/kV was utilized to reduce the radiation dose to as low as reasonably achievable. COMPARISON: None. HISTORY: ORDERING SYSTEM PROVIDED HISTORY: fall, head injury, on [facility] TECHNOLOGIST PROVIDED HISTORY: Reason for exam:->fall, head injury, on AC Has a \"code stroke\" or \"stroke alert\" been called? ->No Decision Support Exception - unselect if not a suspected or confirmed emergency medical condition->Emergency Medical Condition (MA) Reason for Exam: fall; ORDERING SYSTEM PROVIDED HISTORY: neck pain s/p fall TECHNOLOGIST PROVIDED HISTORY: Reason for exam:->neck pain s/p fall Decision Support Exception - unselect if not a suspected or confirmed emergency medical condition->Emergency Medical Condition (MA) Reason for Exam: fall FINDINGS: HEAD: BRAIN/VENTRICLES: There is no acute intracranial hemorrhage, mass effect or midline shift. No abnormal extra-axial fluid collection. Cortical atrophy and chronic white matter changes in the brain and associated ventricular enlargement are again demonstrated. ascites. No lymphadenopathy. No acute fracture identified, within the limits of osteopenia. If there remains concern for an occult fracture, MRI may provide more information. XR SHOULDER LEFT (MIN 2 VIEWS)    Result Date: 7/27/2023  EXAMINATION: 3 XRAY VIEWS OF THE LEFT SHOULDER 7/27/2023 12:02 am COMPARISON: 07/28/2022 HISTORY: ORDERING SYSTEM PROVIDED HISTORY: L shoulder pain s/p fall TECHNOLOGIST PROVIDED HISTORY: Reason for exam:->L shoulder pain s/p fall Reason for Exam: fall FINDINGS: Oblique minimally displaced fracture of the distal clavicle. Acromioclavicular and glenohumeral alignment maintained. The visualized chest reveals no acute findings. Oblique minimally displaced distal left clavicle fracture. XR CHEST PORTABLE    Result Date: 7/27/2023  EXAMINATION: ONE XRAY VIEW OF THE CHEST 7/27/2023 12:02 am COMPARISON: 06/08/2022 HISTORY: ORDERING SYSTEM PROVIDED HISTORY: fall, L anterosuperior chest wall pain TECHNOLOGIST PROVIDED HISTORY: Reason for exam:->fall, L anterosuperior chest wall pain Reason for Exam: fall FINDINGS: The cardiomediastinal silhouette is unchanged in appearance. Generalized interstitial prominence again demonstrated. There is no consolidation, pneumothorax, or evidence of edema. No effusion is appreciated. Old lateral left rib fractures again demonstrated. Oblique minimally displaced distal left clavicle fracture. No shoulder malalignment identified. Oblique minimally displaced distal left clavicle fracture. Old healed left lateral rib fractures again demonstrated. Chronic findings in the chest without acute airspace disease identified. CBC:   Recent Labs     07/27/23  0250   WBC 9.7   HGB 12.2*        BMP:    Recent Labs     07/27/23  0250   *   K 4.0   CL 99   CO2 25   BUN 16   CREATININE 0.9   GLUCOSE 209*     Hepatic: No results for input(s): AST, ALT, ALB, BILITOT, ALKPHOS in the last 72 hours.   Lipids: No results found for: CHOL,

## 2023-07-27 NOTE — CONSULTS
Department of Orthopedic Surgery  Physician Assistant   Consult Note        Reason for Consult: Left acromion fracture, eval if operative or not  Requesting Physician: Haritha Moran MD  Date of Service: 7/27/2023 11:48 AM    CHIEF COMPLAINT:  As Above    History Obtained From:  patient    HISTORY OF PRESENT ILLNESS:                The patient is a left-hand-dominant 70 y.o. male who presents with above chief complaint. Patient states he tripped over some close in his bathroom and fell, landing on his left side. He felt immediate pain in the left shoulder and activated EMS. He presented to [address] [place] [address] where he was found to have a left distal clavicle fracture and orthopedics was consulted for recommendations. Patient states that he does not use an assistive device at baseline but knows that he should, he notes frequent falls including up to 17 falls in the last month. Denies any dysesthesia to the left upper extremity. Denies any dizziness, loss of consciousness, lightheadedness related to the fall. Patient lives with his wife but states Janet Salazar is not equally as bad shape as I am\" and she cannot provide him assistance at home. Past Medical History:        Diagnosis Date    Cancer ([facility])     prostate    Diabetes mellitus ([facility] [address] [facility])     Frequent falls     Hypertension      Past Surgical History:        Procedure Laterality Date    FOREARM SURGERY      wires from elbow to wrist    ORTHOPEDIC SURGERY      hip surgery    PROSTATECTOMY           Medications Prior to Admission:   Prior to Admission medications    Medication Sig Start Date End Date Taking? Authorizing Provider   gabapentin (NEURONTIN) 100 MG capsule Take 1 capsule by mouth in the morning and at bedtime.    Yes Historical Provider, MD   erythromycin [facility]) 5 MG/GM ophthalmic ointment Place into the right eye every 8 hours 7/28/22   BRANDEE Wheatley - CNP   sertraline (ZOLOFT) 100 MG tablet Take 1 tablet by mouth daily 6/18/22 Verta Schilder, MD   insulin glargine (LANTUS) 100 UNIT/ML injection vial Inject 22 Units into the skin nightly  Patient taking differently: Inject 30 Units into the skin nightly 6/17/22   Verta Schilder, MD   insulin lispro (HUMALOG) 100 UNIT/ML SOLN injection vial Inject 0-12 Units into the skin 3 times daily (with meals) 6/17/22   Verta Schilder, MD   insulin lispro (HUMALOG) 100 UNIT/ML SOLN injection vial Inject 0-6 Units into the skin nightly 6/17/22   Verta Schilder, MD   atorvastatin (LIPITOR) 40 MG tablet Take 1 tablet by mouth nightly 6/17/22   Verta Schilder, MD   miconazole (MICOTIN) 2 % powder Apply topically 2 times daily. 6/17/22   Verta Schilder, MD   vitamin B-12 1000 MCG tablet Take 1 tablet by mouth daily 6/18/22   Verta Schilder, MD   sennosides-docusate sodium (SENOKOT-S) 8.6-50 MG tablet Take 2 tablets by mouth daily 6/18/22   Verta Schilder, MD   benzocaine-menthol (CEPACOL SORE THROAT) 15-3.6 MG lozenge Take 1 lozenge by mouth every 2 hours as needed for Sore Throat 6/17/22   Verta Schilder, MD   pantoprazole (PROTONIX) 40 MG tablet Take 1 tablet by mouth every morning (before breakfast) 6/18/22   Verta Schilder, MD   [facility]) 20 MG tablet Take 1 tablet by mouth 2 times daily (before meals) 6/17/22   Verta Schilder, MD   aspirin 81 MG tablet Take 81 mg by mouth daily. Historical Provider, MD   ondansetron (ZOFRAN) 4 MG tablet Take 1 tablet by mouth every 8 hours as needed for Nausea. 6/28/13   Theo Gray MD       Allergies:  Cipro [ciprofloxacin hcl], Lisinopril, Metformin and related, and Penicillins    Social History:    Tobacco:  reports that he has never smoked. He does not have any smokeless tobacco history on file. Alcohol:  reports no history of alcohol use. Illicit Drug: No  Family History:   History reviewed. No pertinent family history.     REVIEW OF SYSTEMS:    CONSTITUTIONAL:

## 2023-07-27 NOTE — H&P
Hospital Medicine History & Physical      Date of Admission: 7/27/2023    Date of Service:  Pt seen/examined on 7/27/2023    [x]Admitted to Inpatient with expected LOS greater than two midnights due to medical therapy. []Placed in Observation status. Chief Admission Complaint:  fall    Presenting Admission History:      70 y.o. male who presented to [facility] with fall. PMHx significant for DM2, HTN, HLD, BERNARDO, COPD, PTSD, depression, mild cognitive disorder, prostate cancer s/p prostatectomy (2019), and GERD. Patient brought to the ED after a fall at home. Patient has history of mild cognitive impairment. Family reports that he forgets to use his walker, he is unsteady on his feet and often falls at home. He has had about 10 falls that they report over the past month all mechanical falls. He denies any lightheadedness or dizziness. Family is wondering if patient can be admitted to ARU for PT/OT. Patient reports mild pain in his left shoulder area. Denies any unilateral weakness, numbness or tingling in the extremities.       Assessment/Plan:      Active Hospital Problems    Diagnosis Date Noted    Traumatic closed fracture of acromial end of clavicle with minimal displacement, left, initial encounter Maye Yates 07/27/2023    Fracture of unspecified part of left clavicle, initial encounter for closed fracture [S42.002A] 07/27/2023    Primary malignant neoplasm of prostate ([address]) Sisi Auguste 07/27/2023    Chronic post-traumatic stress disorder (PTSD) after  combat [F43.12] 07/27/2023    Essential (primary) hypertension [I10] 07/27/2023    Major depression in partial remission ([address]) [F32.4] 07/27/2023    Mild cognitive disorder [F09] 07/27/2023    Unsteadiness on feet [R26.81] 07/27/2023    Diabetic polyneuropathy associated with type 2 diabetes mellitus ([address]) [E11.42] 05/29/2022    BERNARDO on CPAP [G47.33, Z99.89] 05/29/2022    Mixed hyperlipidemia [E78.2] 05/29/2022     Unsteadiness on feet/frequent for review. Automated exposure control, iterative reconstruction, and/or weight based adjustment of the mA/kV was utilized to reduce the radiation dose to as low as reasonably achievable. COMPARISON: None. HISTORY: ORDERING SYSTEM PROVIDED HISTORY: fall, head injury, on [facility] TECHNOLOGIST PROVIDED HISTORY: Reason for exam:->fall, head injury, on AC Has a \"code stroke\" or \"stroke alert\" been called? ->No Decision Support Exception - unselect if not a suspected or confirmed emergency medical condition->Emergency Medical Condition (MA) Reason for Exam: fall; ORDERING SYSTEM PROVIDED HISTORY: neck pain s/p fall TECHNOLOGIST PROVIDED HISTORY: Reason for exam:->neck pain s/p fall Decision Support Exception - unselect if not a suspected or confirmed emergency medical condition->Emergency Medical Condition (MA) Reason for Exam: fall FINDINGS: HEAD: BRAIN/VENTRICLES: There is no acute intracranial hemorrhage, mass effect or midline shift. No abnormal extra-axial fluid collection. Cortical atrophy and chronic white matter changes in the brain and associated ventricular enlargement are again demonstrated. ORBITS: The visualized portion of the orbits demonstrate no acute abnormality. SINUSES: The visualized paranasal sinuses and mastoid air cells demonstrate no acute abnormality. SOFT TISSUES/SKULL: No acute abnormality of the visualized skull or soft tissues. CERVICAL SPINE: BONES/ALIGNMENT: There is no evidence of an acute cervical spine fracture. No traumatic malalignment. DEGENERATIVE CHANGES: Advanced multilevel degenerative disc disease and facet arthropathy. Bulky calcification in the ligamentum flavum at the C5-6 level noted. SOFT TISSUES: There is no prevertebral soft tissue swelling. Chronic findings in the brain without acute CT abnormality identified. No acute osseous abnormality identified in the cervical spine.      CT PELVIS WO CONTRAST Additional Contrast? None    Result Date: 7/27/2023  EXAMINATION: CT OF THE

## 2023-07-27 NOTE — PROGRESS NOTES
Physician Progress Note      PATIENT:               Lina Baca  CSN #:                  [id number]  :                       1945  ADMIT DATE:       2023 11:46 PM  [address] [facility] DATE:  RESPONDING  PROVIDER #:        Zurdo Lizarraga MD          QUERY TEXT:    Pt admitted with fall/clavicle fracture and noted to need IP stay for   consideration to ARU on H/P. No plans for surgery and sling with gently ROM   per Ortho consult. If possible, please document in the progress notes and   discharge summary if you are evaluating and / or treating any of the   following: The medical record reflects the following:  Risk Factors: mild cognitive disorder forgets to use walker, 10 falls at home   in last month, Left clavicle fx, Wife unable to care for pt at home  Clinical Indicators: per H&P- \"Unsteadiness on feet/frequent falls Fracture of   left clavicle, traumatic , closed, initial encounter  -Will need IP stay for consideration to ARU\"  per PT- \"requires mod A to ambulate with quad cane and very unsteady,   progressed to CGA with practice and max cues for correct sequencing the quad   cane. ..rec to DC to SNF\"  per OT- \"required modA for bed mobility, CGA for functional transfers,   min-modA progressing to CGA for functional mobility, and CGA for toilet   transfer\"  Treatment: Ortho consult- sling and non surgical mgmt of fx, Imaging, PT/OT   eval for ARU vs SNF for rehab, CM to assist with discharge arrangements,   supportive safety care    Thank you, Thuan Crow RN BSN  Options provided:  -- Age Related Physical Debility  -- Other - I will add my own diagnosis  -- Disagree - Not applicable / Not valid  -- Disagree - Clinically unable to determine / Unknown  -- Refer to Clinical Documentation Reviewer    PROVIDER RESPONSE TEXT:    This patient has age related physical debility.     Query created by: Leif Harvey on 2023 12:46 PM      Electronically signed by:  Zurdo Lizarraga MD 2023 1:12 Detail Level: Detailed

## 2023-07-28 VITALS
OXYGEN SATURATION: 93 % | DIASTOLIC BLOOD PRESSURE: 71 MMHG | HEART RATE: 71 BPM | WEIGHT: 200 LBS | RESPIRATION RATE: 16 BRPM | SYSTOLIC BLOOD PRESSURE: 128 MMHG | TEMPERATURE: 97.9 F | HEIGHT: 69 IN | BODY MASS INDEX: 29.62 KG/M2

## 2023-07-28 LAB
ALBUMIN SERPL-MCNC: 3.4 G/DL (ref 3.4–5)
ALBUMIN/GLOB SERPL: 1.1 {RATIO} (ref 1.1–2.2)
ALP SERPL-CCNC: 147 U/L (ref 40–129)
ALT SERPL-CCNC: 6 U/L (ref 10–40)
ANION GAP SERPL CALCULATED.3IONS-SCNC: 8 MMOL/L (ref 3–16)
AST SERPL-CCNC: 20 U/L (ref 15–37)
BASOPHILS # BLD: 0 K/UL (ref 0–0.2)
BASOPHILS NFR BLD: 0.6 %
BILIRUB SERPL-MCNC: 0.4 MG/DL (ref 0–1)
BILIRUB UR QL STRIP.AUTO: NEGATIVE
BUN SERPL-MCNC: 12 MG/DL (ref 7–20)
CALCIUM SERPL-MCNC: 8.8 MG/DL (ref 8.3–10.6)
CHLORIDE SERPL-SCNC: 100 MMOL/L (ref 99–110)
CLARITY UR: CLEAR
CO2 SERPL-SCNC: 28 MMOL/L (ref 21–32)
COLOR UR: YELLOW
CREAT SERPL-MCNC: 0.9 MG/DL (ref 0.8–1.3)
DEPRECATED RDW RBC AUTO: 21.4 % (ref 12.4–15.4)
EOSINOPHIL # BLD: 0.5 K/UL (ref 0–0.6)
EOSINOPHIL NFR BLD: 7.2 %
GFR SERPLBLD CREATININE-BSD FMLA CKD-EPI: >60 ML/MIN/{1.73_M2}
GLUCOSE BLD-MCNC: 127 MG/DL (ref 70–99)
GLUCOSE BLD-MCNC: 167 MG/DL (ref 70–99)
GLUCOSE BLD-MCNC: 276 MG/DL (ref 70–99)
GLUCOSE SERPL-MCNC: 139 MG/DL (ref 70–99)
GLUCOSE UR STRIP.AUTO-MCNC: NEGATIVE MG/DL
HCT VFR BLD AUTO: 35.3 % (ref 40.5–52.5)
HGB BLD-MCNC: 11.7 G/DL (ref 13.5–17.5)
HGB UR QL STRIP.AUTO: NEGATIVE
KETONES UR STRIP.AUTO-MCNC: NEGATIVE MG/DL
LEUKOCYTE ESTERASE UR QL STRIP.AUTO: NEGATIVE
LYMPHOCYTES # BLD: 2.5 K/UL (ref 1–5.1)
LYMPHOCYTES NFR BLD: 37 %
MCH RBC QN AUTO: 25.9 PG (ref 26–34)
MCHC RBC AUTO-ENTMCNC: 33 G/DL (ref 31–36)
MCV RBC AUTO: 78.3 FL (ref 80–100)
MONOCYTES # BLD: 0.6 K/UL (ref 0–1.3)
MONOCYTES NFR BLD: 8.7 %
NEUTROPHILS # BLD: 3.1 K/UL (ref 1.7–7.7)
NEUTROPHILS NFR BLD: 46.5 %
NITRITE UR QL STRIP.AUTO: NEGATIVE
PERFORMED ON: ABNORMAL
PH UR STRIP.AUTO: 6.5 [PH] (ref 5–8)
PLATELET # BLD AUTO: 164 K/UL (ref 135–450)
PMV BLD AUTO: 8.8 FL (ref 5–10.5)
POTASSIUM SERPL-SCNC: 4.1 MMOL/L (ref 3.5–5.1)
PROT SERPL-MCNC: 6.4 G/DL (ref 6.4–8.2)
PROT UR STRIP.AUTO-MCNC: NEGATIVE MG/DL
RBC # BLD AUTO: 4.51 M/UL (ref 4.2–5.9)
SODIUM SERPL-SCNC: 136 MMOL/L (ref 136–145)
SP GR UR STRIP.AUTO: 1.01 (ref 1–1.03)
UA COMPLETE W REFLEX CULTURE PNL UR: NORMAL
UA DIPSTICK W REFLEX MICRO PNL UR: NORMAL
URN SPEC COLLECT METH UR: NORMAL
UROBILINOGEN UR STRIP-ACNC: 1 E.U./DL
WBC # BLD AUTO: 6.7 K/UL (ref 4–11)

## 2023-07-28 PROCEDURE — 6360000002 HC RX W HCPCS: Performed by: NURSE PRACTITIONER

## 2023-07-28 PROCEDURE — 36415 COLL VENOUS BLD VENIPUNCTURE: CPT

## 2023-07-28 PROCEDURE — 6370000000 HC RX 637 (ALT 250 FOR IP): Performed by: NURSE PRACTITIONER

## 2023-07-28 PROCEDURE — 80053 COMPREHEN METABOLIC PANEL: CPT

## 2023-07-28 PROCEDURE — 6370000000 HC RX 637 (ALT 250 FOR IP): Performed by: STUDENT IN AN ORGANIZED HEALTH CARE EDUCATION/TRAINING PROGRAM

## 2023-07-28 PROCEDURE — 85025 COMPLETE CBC W/AUTO DIFF WBC: CPT

## 2023-07-28 PROCEDURE — 6370000000 HC RX 637 (ALT 250 FOR IP): Performed by: INTERNAL MEDICINE

## 2023-07-28 PROCEDURE — 81003 URINALYSIS AUTO W/O SCOPE: CPT

## 2023-07-28 PROCEDURE — 2580000003 HC RX 258: Performed by: NURSE PRACTITIONER

## 2023-07-28 RX ORDER — NIFEDIPINE 30 MG/1
30 TABLET, EXTENDED RELEASE ORAL DAILY
Qty: 30 TABLET | Refills: 1 | DISCHARGE
Start: 2023-07-29

## 2023-07-28 RX ORDER — HYDROCODONE BITARTRATE AND ACETAMINOPHEN 5; 325 MG/1; MG/1
1 TABLET ORAL EVERY 6 HOURS PRN
Qty: 12 TABLET | Refills: 0 | Status: SHIPPED | OUTPATIENT
Start: 2023-07-28 | End: 2023-07-31

## 2023-07-28 RX ORDER — NIFEDIPINE 30 MG/1
30 TABLET, EXTENDED RELEASE ORAL DAILY
Status: DISCONTINUED | OUTPATIENT
Start: 2023-07-28 | End: 2023-07-28 | Stop reason: HOSPADM

## 2023-07-28 RX ADMIN — ASPIRIN 81 MG: 81 TABLET, COATED ORAL at 09:43

## 2023-07-28 RX ADMIN — NIFEDIPINE 30 MG: 30 TABLET, FILM COATED, EXTENDED RELEASE ORAL at 13:18

## 2023-07-28 RX ADMIN — SERTRALINE 100 MG: 50 TABLET, FILM COATED ORAL at 09:43

## 2023-07-28 RX ADMIN — GABAPENTIN 100 MG: 100 CAPSULE ORAL at 09:43

## 2023-07-28 RX ADMIN — TROSPIUM CHLORIDE 20 MG: 20 TABLET, FILM COATED ORAL at 07:31

## 2023-07-28 RX ADMIN — PANTOPRAZOLE SODIUM 40 MG: 40 TABLET, DELAYED RELEASE ORAL at 07:31

## 2023-07-28 RX ADMIN — Medication 10 ML: at 09:44

## 2023-07-28 RX ADMIN — HYDROCODONE BITARTRATE AND ACETAMINOPHEN 1 TABLET: 5; 325 TABLET ORAL at 03:46

## 2023-07-28 RX ADMIN — ENOXAPARIN SODIUM 40 MG: 100 INJECTION SUBCUTANEOUS at 09:43

## 2023-07-28 ASSESSMENT — PAIN SCALES - GENERAL: PAINLEVEL_OUTOF10: 5

## 2023-07-28 NOTE — CARE COORDINATION
CASE MANAGEMENT DISCHARGE SUMMARY      Discharge to: [facility]    Precertification completed: 7/28/23  Hospital Exemption Notification (HENS) completed: 7/28/23    IMM given: [facility] [employer] [facility] ordered/agency: none    Transportation:    Family/car:n/a   Medical Transport explained to pt/family. Pt/family voice no agency preference. Agency used: [facility]   time: [employer]   Ambulance form completed: Yes    Confirmed discharge plan with:     Patient: yes     Family:  yes in room at bedside     Facility/Agency, name:  [facility] faxed   Phone number for report to facility: [phone]     RN, name: Alfredo Rosalinda    Note: Discharging nurse to complete [facility], reconcile [facility], and place final copy with patient's discharge packet. RN to ensure that written prescriptions for  Level II medications are sent with patient to the facility as per protocol.

## 2023-07-28 NOTE — PLAN OF CARE
Problem: Discharge Planning  Goal: Discharge to home or other facility with appropriate resources  3/11/6857 8631 by Debi Lay RN  Outcome: Progressing     Problem: Pain  Goal: Verbalizes/displays adequate comfort level or baseline comfort level  8/53/5702 3655 by Debi Lay RN  Outcome: Progressing  7/27/2023 1041 by Adilia Ashford RN  Outcome: Progressing     Problem: ABCDS Injury Assessment  Goal: Absence of physical injury  2/10/2390 1716 by Debi Lay RN  Outcome: Progressing  7/27/2023 1041 by Adilia Ashford RN  Outcome: Progressing     Problem: Safety - Adult  Goal: Free from fall injury  5/29/6505 6945 by Debi Lay RN  Outcome: Progressing  7/27/2023 1041 by Adilia Ashford RN  Outcome: Progressing     Problem: Chronic Conditions and Co-morbidities  Goal: Patient's chronic conditions and co-morbidity symptoms are monitored and maintained or improved  2/02/7157 6681 by Debi Lay RN  Outcome: Progressing  7/27/2023 1041 by Adilia Ashford RN  Outcome: Progressing

## 2023-07-28 NOTE — PLAN OF CARE
Problem: Discharge Planning  Goal: Discharge to home or other facility with appropriate resources  Outcome: Adequate for Discharge     Problem: Pain  Goal: Verbalizes/displays adequate comfort level or baseline comfort level  Outcome: Adequate for Discharge     Problem: ABCDS Injury Assessment  Goal: Absence of physical injury  Outcome: Adequate for Discharge     Problem: Safety - Adult  Goal: Free from fall injury  Outcome: Adequate for Discharge     Problem: Chronic Conditions and Co-morbidities  Goal: Patient's chronic conditions and co-morbidity symptoms are monitored and maintained or improved  Outcome: Adequate for Discharge

## 2023-07-28 NOTE — PROGRESS NOTES
Report called to receiving nurse at [facility].  Pt transported via ambulance service

## 2023-07-28 NOTE — DISCHARGE SUMMARY
or confirmed emergency medical condition->Emergency Medical Condition (MA) Reason for Exam: fall; ORDERING SYSTEM PROVIDED HISTORY: neck pain s/p fall TECHNOLOGIST PROVIDED HISTORY: Reason for exam:->neck pain s/p fall Decision Support Exception - unselect if not a suspected or confirmed emergency medical condition->Emergency Medical Condition (MA) Reason for Exam: fall FINDINGS: HEAD: BRAIN/VENTRICLES: There is no acute intracranial hemorrhage, mass effect or midline shift. No abnormal extra-axial fluid collection. Cortical atrophy and chronic white matter changes in the brain and associated ventricular enlargement are again demonstrated. ORBITS: The visualized portion of the orbits demonstrate no acute abnormality. SINUSES: The visualized paranasal sinuses and mastoid air cells demonstrate no acute abnormality. SOFT TISSUES/SKULL: No acute abnormality of the visualized skull or soft tissues. CERVICAL SPINE: BONES/ALIGNMENT: There is no evidence of an acute cervical spine fracture. No traumatic malalignment. DEGENERATIVE CHANGES: Advanced multilevel degenerative disc disease and facet arthropathy. Bulky calcification in the ligamentum flavum at the C5-6 level noted. SOFT TISSUES: There is no prevertebral soft tissue swelling. Chronic findings in the brain without acute CT abnormality identified. No acute osseous abnormality identified in the cervical spine.      CT CERVICAL SPINE WO CONTRAST    Result Date: 7/27/2023  EXAMINATION: CT OF THE HEAD WITHOUT CONTRAST; CT OF THE CERVICAL SPINE WITHOUT CONTRAST 7/27/2023 12:02 am; 7/27/2023 12:03 am TECHNIQUE: CT of the head was performed without the administration of intravenous contrast. Automated exposure control, iterative reconstruction, and/or weight based adjustment of the mA/kV was utilized to reduce the radiation dose to as low as reasonably achievable.; CT of the cervical spine was performed without the administration of intravenous contrast. Multiplanar Urine Cultures: No results found for: LABURIN  Blood Cultures: No results found for: BC  No results found for: BLOODCULT2  Organism: No results found for: ORG    Time Spent Discharging patient 49 minutes    Electronically signed by Da Baeza MD on 8/45/2571 at 4:14 PM

## 2023-07-28 NOTE — DISCHARGE INSTR - DIET

## 2023-07-28 NOTE — PROGRESS NOTES
The visualized soft tissues of the lower abdomen and within the pelvis reveal no acute findings. No ascites. No lymphadenopathy. No acute fracture identified, within the limits of osteopenia. If there remains concern for an occult fracture, MRI may provide more information. XR SHOULDER LEFT (MIN 2 VIEWS)    Result Date: 7/27/2023  EXAMINATION: 3 XRAY VIEWS OF THE LEFT SHOULDER 7/27/2023 12:02 am COMPARISON: 07/28/2022 HISTORY: ORDERING SYSTEM PROVIDED HISTORY: L shoulder pain s/p fall TECHNOLOGIST PROVIDED HISTORY: Reason for exam:->L shoulder pain s/p fall Reason for Exam: fall FINDINGS: Oblique minimally displaced fracture of the distal clavicle. Acromioclavicular and glenohumeral alignment maintained. The visualized chest reveals no acute findings. Oblique minimally displaced distal left clavicle fracture. XR CHEST PORTABLE    Result Date: 7/27/2023  EXAMINATION: ONE XRAY VIEW OF THE CHEST 7/27/2023 12:02 am COMPARISON: 06/08/2022 HISTORY: ORDERING SYSTEM PROVIDED HISTORY: fall, L anterosuperior chest wall pain TECHNOLOGIST PROVIDED HISTORY: Reason for exam:->fall, L anterosuperior chest wall pain Reason for Exam: fall FINDINGS: The cardiomediastinal silhouette is unchanged in appearance. Generalized interstitial prominence again demonstrated. There is no consolidation, pneumothorax, or evidence of edema. No effusion is appreciated. Old lateral left rib fractures again demonstrated. Oblique minimally displaced distal left clavicle fracture. No shoulder malalignment identified. Oblique minimally displaced distal left clavicle fracture. Old healed left lateral rib fractures again demonstrated. Chronic findings in the chest without acute airspace disease identified.        CBC:   Recent Labs     07/27/23  0250 07/28/23  0523   WBC 9.7 6.7   HGB 12.2* 11.7*    164       BMP:    Recent Labs     07/27/23  0250 07/28/23  0523   * 136   K 4.0 4.1   CL 99 100   CO2 25 28   BUN 16

## 2023-07-28 NOTE — CARE COORDINATION
Chart reviewed day 1. Pt followed by IM and [facility]. Per ortho nonoperatively management and can follow up OP in 2 weeks w/Dr.Thomas Macy Martell. Awaiting IM recs. Pt has been accepted at [facility] and Ubaldo Ahumada is approved indefinitely. Pt from home w/spouse w/multiple falls. Will follow for needs as they arise. Electronically signed by Calli Magallon RN on 7/28/2023 at 8:32 AM    Writer paged [facility] for plan of care r/t d/c plan.  Electronically signed by Calli Magallon RN on 7/28/2023 at 2:56 PM

## 2023-08-02 ENCOUNTER — OFFICE VISIT (OUTPATIENT)
Dept: ORTHOPEDIC SURGERY | Age: 78
End: 2023-08-02

## 2023-08-02 VITALS — BODY MASS INDEX: 29.62 KG/M2 | HEIGHT: 69 IN | WEIGHT: 200 LBS

## 2023-08-02 DIAGNOSIS — R52 PAIN: ICD-10-CM

## 2023-08-02 DIAGNOSIS — S42.035A CLOSED NONDISPLACED FRACTURE OF ACROMIAL END OF LEFT CLAVICLE, INITIAL ENCOUNTER: Primary | ICD-10-CM

## 2023-08-02 NOTE — PROGRESS NOTES
ORTHOPAEDIC SURGERY INITIAL EVALUATION NOTE  Chief Complaint   Patient presents with    Shoulder Pain     Clavicle Fx      HISTORY OF PRESENT ILLNESS:  24-year-old right-hand-dominant male presents for evaluation of left shoulder injury. He tripped in the bathroom and landed onto the left shoulder. He had immediate pain and swelling to the left shoulder. He denies numbness or tingling. He continues to rate his pain an 8 out of 10 diffusely about the left shoulder. He denies numbness or tingling. His pain is worse with activity and improved with rest.  He has been using a sling for mobilization. It has been approximately 1 week since his injury. Past Medical History:   Diagnosis Date    Cancer ([address] )     prostate    Diabetes mellitus ([address] [facility])     Frequent falls     Hypertension        Current Outpatient Medications   Medication Sig Dispense Refill    NIFEdipine (PROCARDIA XL) 30 MG extended release tablet Take 1 tablet by mouth daily 30 tablet 1    gabapentin (NEURONTIN) 100 MG capsule Take 1 capsule by mouth in the morning and at bedtime. erythromycin (ROMYCIN) 5 MG/GM ophthalmic ointment Place into the right eye every 8 hours 3.5 g 0    sertraline (ZOLOFT) 100 MG tablet Take 1 tablet by mouth daily 30 tablet 3    insulin glargine (LANTUS) 100 UNIT/ML injection vial Inject 22 Units into the skin nightly (Patient taking differently: Inject 30 Units into the skin nightly) 10 mL 3    insulin lispro (HUMALOG) 100 UNIT/ML SOLN injection vial Inject 0-12 Units into the skin 3 times daily (with meals) 1 each 0    insulin lispro (HUMALOG) 100 UNIT/ML SOLN injection vial Inject 0-6 Units into the skin nightly 1 each 0    atorvastatin (LIPITOR) 40 MG tablet Take 1 tablet by mouth nightly 30 tablet 3    miconazole (MICOTIN) 2 % powder Apply topically 2 times daily.  45 g 1    vitamin B-12 1000 MCG tablet Take 1 tablet by mouth daily 30 tablet 3    sennosides-docusate sodium (SENOKOT-S) 8.6-50 MG tablet Take 2

## 2023-08-30 ENCOUNTER — OFFICE VISIT (OUTPATIENT)
Dept: ORTHOPEDIC SURGERY | Age: 78
End: 2023-08-30

## 2023-08-30 VITALS — BODY MASS INDEX: 29.62 KG/M2 | WEIGHT: 200 LBS | HEIGHT: 69 IN

## 2023-08-30 DIAGNOSIS — R52 PAIN: ICD-10-CM

## 2023-08-30 DIAGNOSIS — S42.035D CLOSED NONDISPLACED FRACTURE OF ACROMIAL END OF LEFT CLAVICLE WITH ROUTINE HEALING, SUBSEQUENT ENCOUNTER: Primary | ICD-10-CM

## 2023-08-30 NOTE — PROGRESS NOTES
shoulder. Would avoid overhead lifting or pulling up in bed with an overhead trapeze at this time point. Would hold off on strengthening activities/resistance for the left upper extremity at this time. Would allow the patient to progress to strengthening activities with physical therapy (in 4 weeks) effective 9/27/2023. Return to clinic if needed.     Mary Jane Arenas MD

## 2023-10-21 ENCOUNTER — APPOINTMENT (OUTPATIENT)
Dept: GENERAL RADIOLOGY | Age: 78
DRG: 280 | End: 2023-10-21
Payer: OTHER GOVERNMENT

## 2023-10-21 ENCOUNTER — APPOINTMENT (OUTPATIENT)
Dept: CT IMAGING | Age: 78
DRG: 280 | End: 2023-10-21
Payer: OTHER GOVERNMENT

## 2023-10-21 ENCOUNTER — HOSPITAL ENCOUNTER (INPATIENT)
Age: 78
LOS: 2 days | Discharge: NURSING, REHAB OR LONG-TERM CARE | DRG: 280 | End: 2023-10-23
Attending: EMERGENCY MEDICINE | Admitting: INTERNAL MEDICINE
Payer: OTHER GOVERNMENT

## 2023-10-21 DIAGNOSIS — J96.01 ACUTE RESPIRATORY FAILURE WITH HYPOXIA (HCC): ICD-10-CM

## 2023-10-21 DIAGNOSIS — R07.9 CHEST PAIN, UNSPECIFIED TYPE: Primary | ICD-10-CM

## 2023-10-21 PROBLEM — I21.3 STEMI (ST ELEVATION MYOCARDIAL INFARCTION) (HCC): Status: ACTIVE | Noted: 2023-10-21

## 2023-10-21 LAB
ALBUMIN SERPL-MCNC: 3.2 G/DL (ref 3.4–5)
ALBUMIN/GLOB SERPL: 0.8 {RATIO} (ref 1.1–2.2)
ALP SERPL-CCNC: 151 U/L (ref 40–129)
ALT SERPL-CCNC: 15 U/L (ref 10–40)
ANION GAP SERPL CALCULATED.3IONS-SCNC: 9 MMOL/L (ref 3–16)
ANTI-XA UNFRAC HEPARIN: 0.16 IU/ML (ref 0.3–0.7)
APTT BLD: 33.3 SEC (ref 22.7–35.9)
AST SERPL-CCNC: 33 U/L (ref 15–37)
BASOPHILS # BLD: 0 K/UL (ref 0–0.2)
BASOPHILS NFR BLD: 0.4 %
BILIRUB SERPL-MCNC: 0.5 MG/DL (ref 0–1)
BILIRUB UR QL STRIP.AUTO: NEGATIVE
BUN SERPL-MCNC: 13 MG/DL (ref 7–20)
CALCIUM SERPL-MCNC: 8.4 MG/DL (ref 8.3–10.6)
CHLORIDE SERPL-SCNC: 95 MMOL/L (ref 99–110)
CHOLEST SERPL-MCNC: 71 MG/DL (ref 0–199)
CLARITY UR: CLEAR
CO2 SERPL-SCNC: 27 MMOL/L (ref 21–32)
COLOR UR: YELLOW
CREAT SERPL-MCNC: 1.1 MG/DL (ref 0.8–1.3)
DEPRECATED RDW RBC AUTO: 15.3 % (ref 12.4–15.4)
EKG ATRIAL RATE: 85 BPM
EKG ATRIAL RATE: 87 BPM
EKG ATRIAL RATE: 93 BPM
EKG DIAGNOSIS: NORMAL
EKG P AXIS: 38 DEGREES
EKG P AXIS: 45 DEGREES
EKG P AXIS: 53 DEGREES
EKG P-R INTERVAL: 170 MS
EKG P-R INTERVAL: 182 MS
EKG P-R INTERVAL: 184 MS
EKG Q-T INTERVAL: 416 MS
EKG Q-T INTERVAL: 440 MS
EKG Q-T INTERVAL: 442 MS
EKG QRS DURATION: 108 MS
EKG QRS DURATION: 114 MS
EKG QRS DURATION: 114 MS
EKG QTC CALCULATION (BAZETT): 517 MS
EKG QTC CALCULATION (BAZETT): 523 MS
EKG QTC CALCULATION (BAZETT): 531 MS
EKG R AXIS: -1 DEGREES
EKG R AXIS: 11 DEGREES
EKG R AXIS: 8 DEGREES
EKG T AXIS: 139 DEGREES
EKG T AXIS: 151 DEGREES
EKG T AXIS: 98 DEGREES
EKG VENTRICULAR RATE: 85 BPM
EKG VENTRICULAR RATE: 87 BPM
EKG VENTRICULAR RATE: 93 BPM
EOSINOPHIL # BLD: 0.2 K/UL (ref 0–0.6)
EOSINOPHIL NFR BLD: 1.6 %
GFR SERPLBLD CREATININE-BSD FMLA CKD-EPI: >60 ML/MIN/{1.73_M2}
GLUCOSE BLD-MCNC: 111 MG/DL (ref 70–99)
GLUCOSE BLD-MCNC: 119 MG/DL (ref 70–99)
GLUCOSE BLD-MCNC: 138 MG/DL (ref 70–99)
GLUCOSE BLD-MCNC: 173 MG/DL (ref 70–99)
GLUCOSE BLD-MCNC: 220 MG/DL (ref 70–99)
GLUCOSE SERPL-MCNC: 249 MG/DL (ref 70–99)
GLUCOSE UR STRIP.AUTO-MCNC: NEGATIVE MG/DL
HCT VFR BLD AUTO: 36.7 % (ref 40.5–52.5)
HDLC SERPL-MCNC: 40 MG/DL (ref 40–60)
HGB BLD-MCNC: 12.1 G/DL (ref 13.5–17.5)
HGB UR QL STRIP.AUTO: NEGATIVE
INR PPP: 1.13 (ref 0.84–1.16)
KETONES UR STRIP.AUTO-MCNC: NEGATIVE MG/DL
LDLC SERPL CALC-MCNC: NORMAL MG/DL
LDLC SERPL-MCNC: 25 MG/DL
LEUKOCYTE ESTERASE UR QL STRIP.AUTO: NEGATIVE
LYMPHOCYTES # BLD: 2.2 K/UL (ref 1–5.1)
LYMPHOCYTES NFR BLD: 19.4 %
MCH RBC QN AUTO: 25.7 PG (ref 26–34)
MCHC RBC AUTO-ENTMCNC: 32.9 G/DL (ref 31–36)
MCV RBC AUTO: 78.3 FL (ref 80–100)
MONOCYTES # BLD: 1.1 K/UL (ref 0–1.3)
MONOCYTES NFR BLD: 10 %
NEUTROPHILS # BLD: 7.9 K/UL (ref 1.7–7.7)
NEUTROPHILS NFR BLD: 68.6 %
NITRITE UR QL STRIP.AUTO: NEGATIVE
PERFORMED ON: ABNORMAL
PH UR STRIP.AUTO: 6.5 [PH] (ref 5–8)
PLATELET # BLD AUTO: 244 K/UL (ref 135–450)
PMV BLD AUTO: 8.9 FL (ref 5–10.5)
POTASSIUM SERPL-SCNC: 3.9 MMOL/L (ref 3.5–5.1)
PROT SERPL-MCNC: 7.2 G/DL (ref 6.4–8.2)
PROT UR STRIP.AUTO-MCNC: NEGATIVE MG/DL
PROTHROMBIN TIME: 14.5 SEC (ref 11.5–14.8)
RBC # BLD AUTO: 4.69 M/UL (ref 4.2–5.9)
SODIUM SERPL-SCNC: 131 MMOL/L (ref 136–145)
SP GR UR STRIP.AUTO: 1.01 (ref 1–1.03)
TRIGL SERPL-MCNC: 27 MG/DL (ref 0–150)
TROPONIN, HIGH SENSITIVITY: 23 NG/L (ref 0–22)
TROPONIN, HIGH SENSITIVITY: 23 NG/L (ref 0–22)
TROPONIN, HIGH SENSITIVITY: 26 NG/L (ref 0–22)
UA COMPLETE W REFLEX CULTURE PNL UR: NORMAL
UA DIPSTICK W REFLEX MICRO PNL UR: NORMAL
URN SPEC COLLECT METH UR: NORMAL
UROBILINOGEN UR STRIP-ACNC: 0.2 E.U./DL
VLDLC SERPL CALC-MCNC: NORMAL MG/DL
WBC # BLD AUTO: 11.5 K/UL (ref 4–11)

## 2023-10-21 PROCEDURE — 85347 COAGULATION TIME ACTIVATED: CPT

## 2023-10-21 PROCEDURE — 71260 CT THORAX DX C+: CPT

## 2023-10-21 PROCEDURE — 96374 THER/PROPH/DIAG INJ IV PUSH: CPT

## 2023-10-21 PROCEDURE — B2111ZZ FLUOROSCOPY OF MULTIPLE CORONARY ARTERIES USING LOW OSMOLAR CONTRAST: ICD-10-PCS | Performed by: INTERNAL MEDICINE

## 2023-10-21 PROCEDURE — 80053 COMPREHEN METABOLIC PANEL: CPT

## 2023-10-21 PROCEDURE — B2151ZZ FLUOROSCOPY OF LEFT HEART USING LOW OSMOLAR CONTRAST: ICD-10-PCS | Performed by: INTERNAL MEDICINE

## 2023-10-21 PROCEDURE — 85025 COMPLETE CBC W/AUTO DIFF WBC: CPT

## 2023-10-21 PROCEDURE — 2580000003 HC RX 258: Performed by: INTERNAL MEDICINE

## 2023-10-21 PROCEDURE — 4A023N7 MEASUREMENT OF CARDIAC SAMPLING AND PRESSURE, LEFT HEART, PERCUTANEOUS APPROACH: ICD-10-PCS | Performed by: INTERNAL MEDICINE

## 2023-10-21 PROCEDURE — 2500000003 HC RX 250 WO HCPCS

## 2023-10-21 PROCEDURE — 6370000000 HC RX 637 (ALT 250 FOR IP): Performed by: NURSE PRACTITIONER

## 2023-10-21 PROCEDURE — 99285 EMERGENCY DEPT VISIT HI MDM: CPT

## 2023-10-21 PROCEDURE — 6370000000 HC RX 637 (ALT 250 FOR IP): Performed by: INTERNAL MEDICINE

## 2023-10-21 PROCEDURE — 6360000004 HC RX CONTRAST MEDICATION: Performed by: NURSE PRACTITIONER

## 2023-10-21 PROCEDURE — C8929 TTE W OR WO FOL WCON,DOPPLER: HCPCS

## 2023-10-21 PROCEDURE — 85610 PROTHROMBIN TIME: CPT

## 2023-10-21 PROCEDURE — 94761 N-INVAS EAR/PLS OXIMETRY MLT: CPT

## 2023-10-21 PROCEDURE — 2709999900 HC NON-CHARGEABLE SUPPLY: Performed by: INTERNAL MEDICINE

## 2023-10-21 PROCEDURE — 80061 LIPID PANEL: CPT

## 2023-10-21 PROCEDURE — 93458 L HRT ARTERY/VENTRICLE ANGIO: CPT

## 2023-10-21 PROCEDURE — 2700000000 HC OXYGEN THERAPY PER DAY

## 2023-10-21 PROCEDURE — 6360000002 HC RX W HCPCS

## 2023-10-21 PROCEDURE — 85520 HEPARIN ASSAY: CPT

## 2023-10-21 PROCEDURE — 2500000003 HC RX 250 WO HCPCS: Performed by: NURSE PRACTITIONER

## 2023-10-21 PROCEDURE — 85730 THROMBOPLASTIN TIME PARTIAL: CPT

## 2023-10-21 PROCEDURE — 93005 ELECTROCARDIOGRAM TRACING: CPT | Performed by: NURSE PRACTITIONER

## 2023-10-21 PROCEDURE — C1894 INTRO/SHEATH, NON-LASER: HCPCS | Performed by: INTERNAL MEDICINE

## 2023-10-21 PROCEDURE — 2709999900 HC NON-CHARGEABLE SUPPLY

## 2023-10-21 PROCEDURE — 36415 COLL VENOUS BLD VENIPUNCTURE: CPT

## 2023-10-21 PROCEDURE — 93005 ELECTROCARDIOGRAM TRACING: CPT | Performed by: EMERGENCY MEDICINE

## 2023-10-21 PROCEDURE — 2060000000 HC ICU INTERMEDIATE R&B

## 2023-10-21 PROCEDURE — 84484 ASSAY OF TROPONIN QUANT: CPT

## 2023-10-21 PROCEDURE — 71045 X-RAY EXAM CHEST 1 VIEW: CPT

## 2023-10-21 PROCEDURE — 81003 URINALYSIS AUTO W/O SCOPE: CPT

## 2023-10-21 PROCEDURE — 6360000004 HC RX CONTRAST MEDICATION: Performed by: PHYSICIAN ASSISTANT

## 2023-10-21 PROCEDURE — C1769 GUIDE WIRE: HCPCS | Performed by: INTERNAL MEDICINE

## 2023-10-21 PROCEDURE — 6360000002 HC RX W HCPCS: Performed by: EMERGENCY MEDICINE

## 2023-10-21 RX ORDER — SODIUM CHLORIDE 0.9 % (FLUSH) 0.9 %
5-40 SYRINGE (ML) INJECTION EVERY 12 HOURS SCHEDULED
Status: DISCONTINUED | OUTPATIENT
Start: 2023-10-21 | End: 2023-10-21 | Stop reason: SDUPTHER

## 2023-10-21 RX ORDER — NITROGLYCERIN 0.4 MG/1
0.4 TABLET SUBLINGUAL EVERY 5 MIN PRN
Status: DISCONTINUED | OUTPATIENT
Start: 2023-10-21 | End: 2023-10-23 | Stop reason: HOSPADM

## 2023-10-21 RX ORDER — ACETAMINOPHEN 650 MG/1
650 SUPPOSITORY RECTAL EVERY 6 HOURS PRN
Status: DISCONTINUED | OUTPATIENT
Start: 2023-10-21 | End: 2023-10-23 | Stop reason: HOSPADM

## 2023-10-21 RX ORDER — MORPHINE SULFATE 4 MG/ML
4 INJECTION, SOLUTION INTRAMUSCULAR; INTRAVENOUS EVERY 4 HOURS PRN
Status: DISCONTINUED | OUTPATIENT
Start: 2023-10-21 | End: 2023-10-23 | Stop reason: HOSPADM

## 2023-10-21 RX ORDER — PROCHLORPERAZINE EDISYLATE 5 MG/ML
10 INJECTION INTRAMUSCULAR; INTRAVENOUS EVERY 6 HOURS PRN
Status: DISCONTINUED | OUTPATIENT
Start: 2023-10-21 | End: 2023-10-23 | Stop reason: HOSPADM

## 2023-10-21 RX ORDER — ACETAMINOPHEN 325 MG/1
650 TABLET ORAL EVERY 6 HOURS PRN
Status: DISCONTINUED | OUTPATIENT
Start: 2023-10-21 | End: 2023-10-23 | Stop reason: HOSPADM

## 2023-10-21 RX ORDER — NIFEDIPINE 30 MG/1
30 TABLET, EXTENDED RELEASE ORAL DAILY
Status: DISCONTINUED | OUTPATIENT
Start: 2023-10-21 | End: 2023-10-22

## 2023-10-21 RX ORDER — ATORVASTATIN CALCIUM 80 MG/1
80 TABLET, FILM COATED ORAL NIGHTLY
Status: DISCONTINUED | OUTPATIENT
Start: 2023-10-21 | End: 2023-10-23 | Stop reason: HOSPADM

## 2023-10-21 RX ORDER — INSULIN GLARGINE 100 [IU]/ML
30 INJECTION, SOLUTION SUBCUTANEOUS NIGHTLY
Status: DISCONTINUED | OUTPATIENT
Start: 2023-10-21 | End: 2023-10-23 | Stop reason: HOSPADM

## 2023-10-21 RX ORDER — SODIUM CHLORIDE 9 MG/ML
INJECTION, SOLUTION INTRAVENOUS PRN
Status: DISCONTINUED | OUTPATIENT
Start: 2023-10-21 | End: 2023-10-23 | Stop reason: HOSPADM

## 2023-10-21 RX ORDER — HEPARIN SODIUM 1000 [USP'U]/ML
INJECTION, SOLUTION INTRAVENOUS; SUBCUTANEOUS
Status: COMPLETED | OUTPATIENT
Start: 2023-10-21 | End: 2023-10-21

## 2023-10-21 RX ORDER — HEPARIN SODIUM 1000 [USP'U]/ML
4000 INJECTION, SOLUTION INTRAVENOUS; SUBCUTANEOUS PRN
Status: DISCONTINUED | OUTPATIENT
Start: 2023-10-21 | End: 2023-10-21

## 2023-10-21 RX ORDER — SODIUM CHLORIDE 0.9 % (FLUSH) 0.9 %
5-40 SYRINGE (ML) INJECTION EVERY 12 HOURS SCHEDULED
Status: DISCONTINUED | OUTPATIENT
Start: 2023-10-21 | End: 2023-10-23 | Stop reason: HOSPADM

## 2023-10-21 RX ORDER — POLYETHYLENE GLYCOL 3350 17 G/17G
17 POWDER, FOR SOLUTION ORAL DAILY PRN
Status: DISCONTINUED | OUTPATIENT
Start: 2023-10-21 | End: 2023-10-23 | Stop reason: HOSPADM

## 2023-10-21 RX ORDER — PANTOPRAZOLE SODIUM 40 MG/1
40 TABLET, DELAYED RELEASE ORAL
Status: DISCONTINUED | OUTPATIENT
Start: 2023-10-21 | End: 2023-10-23 | Stop reason: HOSPADM

## 2023-10-21 RX ORDER — HEPARIN SODIUM 1000 [USP'U]/ML
4000 INJECTION, SOLUTION INTRAVENOUS; SUBCUTANEOUS ONCE
Status: COMPLETED | OUTPATIENT
Start: 2023-10-21 | End: 2023-10-21

## 2023-10-21 RX ORDER — SODIUM CHLORIDE 0.9 % (FLUSH) 0.9 %
5-40 SYRINGE (ML) INJECTION PRN
Status: DISCONTINUED | OUTPATIENT
Start: 2023-10-21 | End: 2023-10-21 | Stop reason: SDUPTHER

## 2023-10-21 RX ORDER — ASPIRIN 81 MG/1
324 TABLET, CHEWABLE ORAL ONCE
Status: DISCONTINUED | OUTPATIENT
Start: 2023-10-21 | End: 2023-10-21

## 2023-10-21 RX ORDER — DEXTROSE MONOHYDRATE 100 MG/ML
INJECTION, SOLUTION INTRAVENOUS CONTINUOUS PRN
Status: DISCONTINUED | OUTPATIENT
Start: 2023-10-21 | End: 2023-10-23 | Stop reason: HOSPADM

## 2023-10-21 RX ORDER — SODIUM CHLORIDE 0.9 % (FLUSH) 0.9 %
5-40 SYRINGE (ML) INJECTION PRN
Status: DISCONTINUED | OUTPATIENT
Start: 2023-10-21 | End: 2023-10-23 | Stop reason: HOSPADM

## 2023-10-21 RX ORDER — HEPARIN SODIUM 1000 [USP'U]/ML
2000 INJECTION, SOLUTION INTRAVENOUS; SUBCUTANEOUS PRN
Status: DISCONTINUED | OUTPATIENT
Start: 2023-10-21 | End: 2023-10-21

## 2023-10-21 RX ORDER — HEPARIN SODIUM 1000 [USP'U]/ML
2000 INJECTION, SOLUTION INTRAVENOUS; SUBCUTANEOUS ONCE
Status: DISCONTINUED | OUTPATIENT
Start: 2023-10-21 | End: 2023-10-21

## 2023-10-21 RX ORDER — GABAPENTIN 100 MG/1
200 CAPSULE ORAL 3 TIMES DAILY
Status: DISCONTINUED | OUTPATIENT
Start: 2023-10-21 | End: 2023-10-23 | Stop reason: HOSPADM

## 2023-10-21 RX ORDER — FENTANYL CITRATE 50 UG/ML
INJECTION, SOLUTION INTRAMUSCULAR; INTRAVENOUS
Status: COMPLETED | OUTPATIENT
Start: 2023-10-21 | End: 2023-10-21

## 2023-10-21 RX ORDER — MIDAZOLAM HYDROCHLORIDE 1 MG/ML
INJECTION INTRAMUSCULAR; INTRAVENOUS
Status: COMPLETED | OUTPATIENT
Start: 2023-10-21 | End: 2023-10-21

## 2023-10-21 RX ORDER — SODIUM CHLORIDE 9 MG/ML
INJECTION, SOLUTION INTRAVENOUS CONTINUOUS
Status: DISCONTINUED | OUTPATIENT
Start: 2023-10-21 | End: 2023-10-21 | Stop reason: SDUPTHER

## 2023-10-21 RX ORDER — SODIUM CHLORIDE 9 MG/ML
INJECTION, SOLUTION INTRAVENOUS CONTINUOUS
Status: ACTIVE | OUTPATIENT
Start: 2023-10-21 | End: 2023-10-21

## 2023-10-21 RX ORDER — ASPIRIN 81 MG/1
81 TABLET, CHEWABLE ORAL DAILY
Status: DISCONTINUED | OUTPATIENT
Start: 2023-10-21 | End: 2023-10-23 | Stop reason: HOSPADM

## 2023-10-21 RX ORDER — METOPROLOL SUCCINATE 25 MG/1
25 TABLET, EXTENDED RELEASE ORAL DAILY
Status: DISCONTINUED | OUTPATIENT
Start: 2023-10-21 | End: 2023-10-23 | Stop reason: HOSPADM

## 2023-10-21 RX ORDER — HEPARIN SODIUM 10000 [USP'U]/100ML
1110 INJECTION, SOLUTION INTRAVENOUS CONTINUOUS
Status: DISCONTINUED | OUTPATIENT
Start: 2023-10-21 | End: 2023-10-21

## 2023-10-21 RX ORDER — ACETAMINOPHEN 325 MG/1
650 TABLET ORAL EVERY 4 HOURS PRN
Status: DISCONTINUED | OUTPATIENT
Start: 2023-10-21 | End: 2023-10-21 | Stop reason: SDUPTHER

## 2023-10-21 RX ADMIN — HEPARIN SODIUM 1000 UNITS: 1000 INJECTION, SOLUTION INTRAVENOUS; SUBCUTANEOUS at 11:36

## 2023-10-21 RX ADMIN — ASPIRIN 81 MG: 81 TABLET, CHEWABLE ORAL at 09:30

## 2023-10-21 RX ADMIN — MICONAZOLE NITRATE: 2 POWDER TOPICAL at 20:41

## 2023-10-21 RX ADMIN — SODIUM CHLORIDE: 9 INJECTION, SOLUTION INTRAVENOUS at 10:09

## 2023-10-21 RX ADMIN — METOPROLOL SUCCINATE 25 MG: 25 TABLET, EXTENDED RELEASE ORAL at 09:30

## 2023-10-21 RX ADMIN — INSULIN GLARGINE 30 UNITS: 100 INJECTION, SOLUTION SUBCUTANEOUS at 20:42

## 2023-10-21 RX ADMIN — GABAPENTIN 200 MG: 100 CAPSULE ORAL at 20:41

## 2023-10-21 RX ADMIN — MIDAZOLAM HYDROCHLORIDE 0.5 MG: 1 INJECTION INTRAMUSCULAR; INTRAVENOUS at 11:24

## 2023-10-21 RX ADMIN — MICONAZOLE NITRATE: 2 POWDER TOPICAL at 10:46

## 2023-10-21 RX ADMIN — FENTANYL CITRATE 25 MCG: 50 INJECTION, SOLUTION INTRAMUSCULAR; INTRAVENOUS at 11:18

## 2023-10-21 RX ADMIN — HEPARIN SODIUM 950 UNITS/HR: 10000 INJECTION, SOLUTION INTRAVENOUS at 03:46

## 2023-10-21 RX ADMIN — IOPAMIDOL 75 ML: 755 INJECTION, SOLUTION INTRAVENOUS at 01:07

## 2023-10-21 RX ADMIN — HEPARIN SODIUM 4000 UNITS: 1000 INJECTION INTRAVENOUS; SUBCUTANEOUS at 03:47

## 2023-10-21 RX ADMIN — GABAPENTIN 200 MG: 100 CAPSULE ORAL at 09:30

## 2023-10-21 RX ADMIN — GABAPENTIN 200 MG: 100 CAPSULE ORAL at 15:42

## 2023-10-21 RX ADMIN — PERFLUTREN 1.5 ML: 6.52 INJECTION, SUSPENSION INTRAVENOUS at 08:50

## 2023-10-21 ASSESSMENT — PAIN SCALES - GENERAL
PAINLEVEL_OUTOF10: 0
PAINLEVEL_OUTOF10: 3
PAINLEVEL_OUTOF10: 0
PAINLEVEL_OUTOF10: 4
PAINLEVEL_OUTOF10: 0
PAINLEVEL_OUTOF10: 0

## 2023-10-21 ASSESSMENT — PATIENT HEALTH QUESTIONNAIRE - PHQ9
SUM OF ALL RESPONSES TO PHQ QUESTIONS 1-9: 0
SUM OF ALL RESPONSES TO PHQ QUESTIONS 1-9: 0
2. FEELING DOWN, DEPRESSED OR HOPELESS: 0
1. LITTLE INTEREST OR PLEASURE IN DOING THINGS: 0
SUM OF ALL RESPONSES TO PHQ QUESTIONS 1-9: 0
SUM OF ALL RESPONSES TO PHQ QUESTIONS 1-9: 0
SUM OF ALL RESPONSES TO PHQ9 QUESTIONS 1 & 2: 0

## 2023-10-21 ASSESSMENT — PAIN - FUNCTIONAL ASSESSMENT: PAIN_FUNCTIONAL_ASSESSMENT: 0-10

## 2023-10-21 ASSESSMENT — PAIN DESCRIPTION - LOCATION: LOCATION: ARM;CHEST

## 2023-10-21 NOTE — PLAN OF CARE
Problem: Discharge Planning  Goal: Discharge to home or other facility with appropriate resources  Outcome: Progressing     Problem: Pain  Goal: Verbalizes/displays adequate comfort level or baseline comfort level  Outcome: Progressing     Problem: Skin/Tissue Integrity  Goal: Absence of new skin breakdown  Description: 1. Monitor for areas of redness and/or skin breakdown  2. Assess vascular access sites hourly  3. Every 4-6 hours minimum:  Change oxygen saturation probe site  4. Every 4-6 hours:  If on nasal continuous positive airway pressure, respiratory therapy assess nares and determine need for appliance change or resting period.   Outcome: Progressing     Problem: Safety - Adult  Goal: Free from fall injury  Outcome: Progressing     Problem: ABCDS Injury Assessment  Goal: Absence of physical injury  Outcome: Progressing     Problem: Respiratory - Adult  Goal: Achieves optimal ventilation and oxygenation  Outcome: Progressing     Problem: Cardiovascular - Adult  Goal: Maintains optimal cardiac output and hemodynamic stability  Outcome: Progressing  Goal: Absence of cardiac dysrhythmias or at baseline  Outcome: Progressing     Problem: Skin/Tissue Integrity - Adult  Goal: Skin integrity remains intact  Outcome: Progressing  Goal: Incisions, wounds, or drain sites healing without S/S of infection  Outcome: Progressing  Goal: Oral mucous membranes remain intact  Outcome: Progressing     Problem: Musculoskeletal - Adult  Goal: Return mobility to safest level of function  Outcome: Progressing  Goal: Maintain proper alignment of affected body part  Outcome: Progressing  Goal: Return ADL status to a safe level of function  Outcome: Progressing     Problem: Gastrointestinal - Adult  Goal: Minimal or absence of nausea and vomiting  Outcome: Progressing  Goal: Maintains or returns to baseline bowel function  Outcome: Progressing  Goal: Maintains adequate nutritional intake  Outcome: Progressing  Goal: Establish and maintain optimal ostomy function  Outcome: Progressing     Problem: Genitourinary - Adult  Goal: Absence of urinary retention  Outcome: Progressing  Goal: Urinary catheter remains patent  Outcome: Progressing     Problem: Infection - Adult  Goal: Absence of infection at discharge  Outcome: Progressing  Goal: Absence of infection during hospitalization  Outcome: Progressing  Goal: Absence of fever/infection during anticipated neutropenic period  Outcome: Progressing     Problem: Metabolic/Fluid and Electrolytes - Adult  Goal: Electrolytes maintained within normal limits  Outcome: Progressing  Goal: Hemodynamic stability and optimal renal function maintained  Outcome: Progressing  Goal: Glucose maintained within prescribed range  Outcome: Progressing     Problem: Hematologic - Adult  Goal: Maintains hematologic stability  Outcome: Progressing     Problem: Chronic Conditions and Co-morbidities  Goal: Patient's chronic conditions and co-morbidity symptoms are monitored and maintained or improved  Outcome: Progressing

## 2023-10-21 NOTE — ED PROVIDER NOTES
[address] [place] [address]  ED  EMERGENCY DEPARTMENT ENCOUNTER        Pt Name: Liu Michaels  MRN: [id number]  [address] [facility] 1945  Date of evaluation: 10/21/2023  Provider: ANAI Figueredo  PCP: Charo Freeman MD  Note Started: 12:46 AM EDT 10/21/23       I have seen and evaluated this patient with my supervising physician Muna Johnson, [address]       Chief Complaint   Patient presents with    Chest Pain     2 days, mid to L CP. Recent dx COVID. From [facility]. 324mg ASA and 1SL nitro. EKG possible elevation per EMT. Pain 4/10     Abdominal Pain     Indigestion, n/v but denies diarrhea. Heartburn meds w/o relief       HISTORY OF PRESENT ILLNESS: 1 or more Elements     History from : Patient and Family daughter    Limitations to history : None    Liu Michaels is a 70 y.o. male who presents to the emergency department for chest pain. Patient presents via EMS from skilled nursing facility. Apparently for the past 3 days the patient has been complaining of left-sided chest pain radiating into his back. Nursing home thought patient's symptoms were due to indigestion. However today the patient called his daughter who is a nurse and asked her to bring him to the emergency department because of the chest pain. He describes it as pressure and it does radiate into his left back. He has also had some epigastric abdominal pain and difficulty drinking cold drinks. Currently rates pain as 4/10. He did get aspirin and nitro prior to arrival.  He did have COVID 10 days ago and has been slowly recovering. Nursing Notes were all reviewed and agreed with or any disagreements were addressed in the HPI. REVIEW OF SYSTEMS :      Review of Systems    Positives and Pertinent negatives as per HPI.      SURGICAL HISTORY     Past Surgical History:   Procedure Laterality Date    FOREARM SURGERY      wires from elbow to wrist    ORTHOPEDIC SURGERY      hip surgery    PROSTATECTOMY         CURRENTMEDICATIONS

## 2023-10-21 NOTE — ED NOTES
Report given to [facility] - MARIA GUADALUPE ARIAS  10/21/23 9692

## 2023-10-21 NOTE — PROGRESS NOTES
4 Eyes Skin Assessment     NAME:  Lina Baca  YOB: 1945  MEDICAL RECORD NUMBER:  [id number]    The patient is being assessed for  Admission    I agree that at least one RN has performed a thorough Head to Toe Skin Assessment on the patient. ALL assessment sites listed below have been assessed. Areas assessed by both nurses:    Head, Face, Ears, Shoulders, Back, Chest, Arms, Elbows, Hands, Sacrum. Buttock, Coccyx, Ischium, Legs. Feet and Heels, Under Medical Devices , and Other          Does the Patient have a Wound?  No noted wound(s)       Tony Prevention initiated by RN: Yes  Wound Care Orders initiated by RN: Yes    Pressure Injury (Stage 3,4, Unstageable, DTI, NWPT, and Complex wounds) if present, place Wound referral order by RN under : No    New Ostomies, if present place, Ostomy referral order under : No     Nurse 1 eSignature: Electronically signed by Ki Marte RN on 10/21/23 at 6:29 AM EDT    **SHARE this note so that the co-signing nurse can place an eSignature**    Nurse 2 eSignature: Electronically signed by Dorris Bumpers, RN on 10/22/23 at 8:47 AM EDT

## 2023-10-21 NOTE — CONSULTS
CARDIOLOGY CONSULTATION        Patient Name: Margo Araujo  Date of admission: 10/21/2023 12:15 AM  Admission Dx: STEMI (ST elevation myocardial infarction) ([address]) [I21.3]  Acute respiratory failure with hypoxia ([address]) [J96.01]  Chest pain, unspecified type [R07.9]  Requesting Physician: Matias Myrick MD  Primary Care physician: Quirino Tapia MD    Reason for Consultation/Chief Complaint: Abnormal EKG    History of Present Illness:     Margo Araujo is a 70 y.o. patient with prior medical history notable for Diabetes mellitus, hypertension, who presented to the hospital with complaints of chest pain. ED course/Vital signs on presentation: Labs showed white blood cell count 11.5, sodium level 131, hyperglycemia, creatinine 1.1, high-sensitivity troponins 26, 23, 23. EKG markedly abnormal with sinus rhythm, ST elevations anterior lateral leads with T wave abnormality, prolonged QT, significantly changed from previous. CT scan stat overnight showed no pulmonary embolism, mild cardiomegaly and coronary artery calcification noted. Possible left lower lobe infiltrate. Care was discussed with interventional cardiology overnight. Patient mated and started on baby aspirin daily, high intensity statin, heparin drip. Patient is evaluated this morning. Difficult  historian. Endorses constant chest heaviness x 2 days. He notes worsened with walking at times. He says at other times it helps. Cannot say whether worsening dyspnea. No nausea or emesis. Pain was improved with SL NTG in ED. 0/10 pain currently he states. Denies palpitations, dizziness, near-syncope or kevin syncope. Does have frequent falls. Currently at [facility] getting therapy. Denies paroxysmal nocturnal dyspnea, orthopnea, increasing lower extremity edema. Past Medical History:   has a past medical history of Cancer ([address]), Confusion, Diabetes mellitus ([address]), Frequent falls, and Hypertension.     Surgical History:   has a past surgical history that BERNARDO on CPAP    Type 2 diabetes mellitus with diabetic neuropathy, unspecified (HCC)    Unsteadiness on feet    Weakness    Cancer (HCC)    STEMI (ST elevation myocardial infarction) ([address])         I will address the patient's cardiac risk factors and adjusted pharmacologic treatment as needed. In addition, I have reinforced the need for patient directed risk factor modification. All questions and concerns were addressed to the patient/family. Alternatives to my treatment were discussed. Thank you for allowing us to participate in the care of Issac Matos. Please call me with any questions [phone].     Wesly Diaz MD, [facility] [place]  Cardiovascular Disease  [facility]  [phone] [facility]  [phone] [address]  10/21/2023 7:59 AM

## 2023-10-21 NOTE — CONSULTS
Placed call to interventional cardiology at [phone]  RE: wellens concern for LAD occlusion per MD Dr. Lady Mikki Cedeno called back at [phone] and spoke to [facility]

## 2023-10-21 NOTE — PROGRESS NOTES
HOSPITAL MEDICINE  - BRIEF NOTE    Seen by my colleague today. History/record reviewed, patient seen and examined.     Kamini Sanz MD  10/21/2023  2:55 PM

## 2023-10-21 NOTE — ED NOTES
[facility] to request preliminary read of scans at [address] per Rei Pritchett MD    Stated there were two readings ahead of pt and will be read here shortly    Relayed message to Dr. Ricki Piedra immediately following phone call.      Natasha Gunn  10/21/23 0259

## 2023-10-21 NOTE — CONSULTS
Pharmacy to manage Heparin - contact for questions. Heparin 60 units/kg IV x 1 (max 4,000 units), then 12 units/kg/hr (recommended max initial rate 1,000 units/hr). Adjust infusion rate based off anti-Xa results below. anti-Xa < 0.1    Heparin 60 units/kg bolus  Increase infusion by 4 units/kg/hr  anti-Xa 0.1-0.29 Heparin 30 units/kg bolus Increase infusion by 2 units/kg/hr  anti-Xa 0.3-0.7    No bolus No change   anti-Xa 0.71-0.8   No bolus Decrease infusion by 1 units/kg/hr  anti-Xa 0.81-0.99    No bolus Decrease infusion by 2 units/kg/hr  anti-Xa 1 or more     Hold heparin for 1 hour Decrease infusion by 3 units/kg/hr    Obtain anti-Xa hours after bolus and 6 hours after any dose change until two consecutive therapeutic anti-Xa are achieved- then daily. 10/21/23  Heparin bolus in ED of 4,000 units, initiate heparin infusion of 950 units/hr.    Check anti-Xa level at [facility] PharmD 10/21/2023  2:27 AM    -------------------------------------------------    10/21/2023  anti-Xa level = 0.16 IU/mL at 1050  Give 2000 units bolus  Increase Heparin infusion rate to 1110 units/hr  Recheck level in 6 hours  Wali White PharmD  10/21/2023 11:24 AM    --------------------------------------------------------

## 2023-10-21 NOTE — ED PROVIDER NOTES
I independently examined and evaluated Sara Kirkpatrick. In brief, patient is a 70-year-old male presents to the emergency department for evaluation of chest pain. Patient reports having recent diagnosis of COVID. Started having intermittent left-sided chest pain over the past 2 to 3 days. Was getting treated for indigestion at the nursing home and brought in by daughter. Patient noted to be hypoxic and requiring 2 L nasal cannula O2. Patient does not normally wear oxygen. EKG shows Wellens. Given this, interventional cardiology consulted. Spoke with Dr. Jamal Shannon who says to trend troponin. If ekg changes or up trending troponins to call back. Otherwise, heparin and admit to hospital.  Aspirin received by EMS. Troponin was 26 and downtrended to 23. Chest x-ray shows patchy interstitial changes may represent atelectasis or pneumonia. CT PE shows no acute Pe. Simple right pleural effusion. Started on heparin low dose protocol. Admit to hospitalist.     Heparin ordered. Hospitalist consulted for admission for further evaluation and treatment. Admit. The Ekg interpreted by me shows  Sinus rhythm with occasional premature ventricular complexes with a rate of 87  Axis is normal  QTc is prolonged at 531  Intervals and Durations are unremarkable. ST Segments: Biphasic T waves V2 through V6 concerning for Wellens    All diagnostic, treatment, and disposition decisions were made by myself in conjunction with the advanced practice provider/resident physician. I personally saw the patient and performed a substantive portion of the visit including aspects of the medical decision making. I personally saw the patient and independently provided 15 minutes of non-concurrent critical care out of the total shared critical care time provided.     Comment: Please note this report has been produced using speech recognition software and may contain errors related to that system including errors in grammar,

## 2023-10-21 NOTE — PROGRESS NOTES
Pt admit to room 433, pt alert w/confusion noted. Transferred to room per stretcher.  Oriented to room, call light etc. Pt states \"I want to sleep,  I'm tired\"

## 2023-10-21 NOTE — PROCEDURES
CARDIAC CATHETERIZATION      Jose Del Valle (65 y.o., male)  1945  [id number]    10/21/2023      INDICATION(s):  Chest pain, NSTEMI, abnormal wall motion      PROCEDURE:    Left heart cath  Coronary angiography      Risk, benefits alternatives to cardiac catheterization and possible intervention were discussed with the patient. Informed consent was obtained. The patient was brought to the cath lab and was prepped and draped in the normal sterile technique. 1% Xylocaine was used to anesthetize the site. The right radial was cannulated and a 6 Fr sheath was inserted under ultrasonographic guidance. Continuous pulse-oximetry and ECG monitoring was performed, and frequent blood pressure assessment was performed. An independent trained observer pushed medications at my direction. We monitored the patient's level of consciousness and vital signs/physiologic status throughout the procedure (see start and stop times below). All catheter were advanced through the sheath over a guide wire and advanced under fluoroscopic guidance into the ascending aorta. We used 5 Fr catheters for right and left coronary angiography and to cross the aortic valve. Left ventricular pressure and pullback across aortic valve was recorded. The sheath was removed and hemostasis was obtained with a TR band. The patient was moved to the floor in stable condition. There were no complications. Sedation: 0.5 mg Versed, 25 mcg Fentanyl  Sedation start: 1115am  Sedation stop: 1200 noon      Estimated blood loss: <10 mL      HEMODYNAMIC / ANGIOGRAPHIC DATA:    /57 Left ventricular end diastolic pressure was 11 mmHg. There was no gradient across the aortic valve upon pullback. The left main coronary artery arises from the left coronary cusp giving rise to the left anterior descending artery and the left circumflex artery. The left main reveals no significant stenosis.   The left anterior descending artery arises in normal

## 2023-10-22 LAB
ALBUMIN SERPL-MCNC: 2.7 G/DL (ref 3.4–5)
ALBUMIN/GLOB SERPL: 0.7 {RATIO} (ref 1.1–2.2)
ALP SERPL-CCNC: 146 U/L (ref 40–129)
ALT SERPL-CCNC: 13 U/L (ref 10–40)
ANION GAP SERPL CALCULATED.3IONS-SCNC: 9 MMOL/L (ref 3–16)
AST SERPL-CCNC: 25 U/L (ref 15–37)
BILIRUB SERPL-MCNC: 0.5 MG/DL (ref 0–1)
BUN SERPL-MCNC: 11 MG/DL (ref 7–20)
CALCIUM SERPL-MCNC: 8.3 MG/DL (ref 8.3–10.6)
CHLORIDE SERPL-SCNC: 95 MMOL/L (ref 99–110)
CO2 SERPL-SCNC: 28 MMOL/L (ref 21–32)
CREAT SERPL-MCNC: 0.8 MG/DL (ref 0.8–1.3)
DEPRECATED RDW RBC AUTO: 15.1 % (ref 12.4–15.4)
GFR SERPLBLD CREATININE-BSD FMLA CKD-EPI: >60 ML/MIN/{1.73_M2}
GLUCOSE BLD-MCNC: 179 MG/DL (ref 70–99)
GLUCOSE BLD-MCNC: 225 MG/DL (ref 70–99)
GLUCOSE BLD-MCNC: 236 MG/DL (ref 70–99)
GLUCOSE BLD-MCNC: 284 MG/DL (ref 70–99)
GLUCOSE SERPL-MCNC: 177 MG/DL (ref 70–99)
HCT VFR BLD AUTO: 38 % (ref 40.5–52.5)
HGB BLD-MCNC: 12.4 G/DL (ref 13.5–17.5)
MCH RBC QN AUTO: 25.7 PG (ref 26–34)
MCHC RBC AUTO-ENTMCNC: 32.7 G/DL (ref 31–36)
MCV RBC AUTO: 78.6 FL (ref 80–100)
PERFORMED ON: ABNORMAL
PLATELET # BLD AUTO: 207 K/UL (ref 135–450)
PMV BLD AUTO: 9.3 FL (ref 5–10.5)
POTASSIUM SERPL-SCNC: 4.1 MMOL/L (ref 3.5–5.1)
PROT SERPL-MCNC: 6.7 G/DL (ref 6.4–8.2)
RBC # BLD AUTO: 4.84 M/UL (ref 4.2–5.9)
SODIUM SERPL-SCNC: 132 MMOL/L (ref 136–145)
WBC # BLD AUTO: 12.9 K/UL (ref 4–11)

## 2023-10-22 PROCEDURE — 80053 COMPREHEN METABOLIC PANEL: CPT

## 2023-10-22 PROCEDURE — 2700000000 HC OXYGEN THERAPY PER DAY

## 2023-10-22 PROCEDURE — 85027 COMPLETE CBC AUTOMATED: CPT

## 2023-10-22 PROCEDURE — 6370000000 HC RX 637 (ALT 250 FOR IP): Performed by: INTERNAL MEDICINE

## 2023-10-22 PROCEDURE — 2060000000 HC ICU INTERMEDIATE R&B

## 2023-10-22 PROCEDURE — 6370000000 HC RX 637 (ALT 250 FOR IP): Performed by: NURSE PRACTITIONER

## 2023-10-22 PROCEDURE — 36415 COLL VENOUS BLD VENIPUNCTURE: CPT

## 2023-10-22 PROCEDURE — 2580000003 HC RX 258: Performed by: NURSE PRACTITIONER

## 2023-10-22 PROCEDURE — 94761 N-INVAS EAR/PLS OXIMETRY MLT: CPT

## 2023-10-22 RX ADMIN — MICONAZOLE NITRATE: 2 POWDER TOPICAL at 20:03

## 2023-10-22 RX ADMIN — ACETAMINOPHEN 650 MG: 325 TABLET ORAL at 21:53

## 2023-10-22 RX ADMIN — Medication 10 ML: at 19:59

## 2023-10-22 RX ADMIN — ATORVASTATIN CALCIUM 80 MG: 80 TABLET, FILM COATED ORAL at 20:00

## 2023-10-22 RX ADMIN — SACUBITRIL AND VALSARTAN 1 TABLET: 24; 26 TABLET, FILM COATED ORAL at 20:00

## 2023-10-22 RX ADMIN — GABAPENTIN 200 MG: 100 CAPSULE ORAL at 09:03

## 2023-10-22 RX ADMIN — ASPIRIN 81 MG: 81 TABLET, CHEWABLE ORAL at 09:03

## 2023-10-22 RX ADMIN — ALUMINUM HYDROXIDE, MAGNESIUM HYDROXIDE, AND SIMETHICONE: 200; 200; 20 SUSPENSION ORAL at 20:23

## 2023-10-22 RX ADMIN — GABAPENTIN 200 MG: 100 CAPSULE ORAL at 20:00

## 2023-10-22 RX ADMIN — INSULIN GLARGINE 30 UNITS: 100 INJECTION, SOLUTION SUBCUTANEOUS at 19:59

## 2023-10-22 RX ADMIN — SACUBITRIL AND VALSARTAN 1 TABLET: 24; 26 TABLET, FILM COATED ORAL at 12:20

## 2023-10-22 RX ADMIN — GABAPENTIN 200 MG: 100 CAPSULE ORAL at 14:30

## 2023-10-22 RX ADMIN — Medication 10 ML: at 09:03

## 2023-10-22 RX ADMIN — METOPROLOL SUCCINATE 25 MG: 25 TABLET, EXTENDED RELEASE ORAL at 09:03

## 2023-10-22 RX ADMIN — MICONAZOLE NITRATE: 2 POWDER TOPICAL at 09:04

## 2023-10-22 ASSESSMENT — ENCOUNTER SYMPTOMS
RESPIRATORY NEGATIVE: 1
GASTROINTESTINAL NEGATIVE: 1

## 2023-10-22 ASSESSMENT — PAIN DESCRIPTION - LOCATION: LOCATION: UMBILICUS

## 2023-10-22 ASSESSMENT — PAIN DESCRIPTION - DESCRIPTORS: DESCRIPTORS: ACHING

## 2023-10-22 ASSESSMENT — PAIN SCALES - GENERAL
PAINLEVEL_OUTOF10: 0
PAINLEVEL_OUTOF10: 3

## 2023-10-22 NOTE — PLAN OF CARE
Problem: Discharge Planning  Goal: Discharge to home or other facility with appropriate resources  Outcome: Progressing     Problem: Pain  Goal: Verbalizes/displays adequate comfort level or baseline comfort level  Outcome: Progressing  Flowsheets (Taken 10/21/2023 1546 by Janee Wolf RN)  Verbalizes/displays adequate comfort level or baseline comfort level: Encourage patient to monitor pain and request assistance     Problem: Skin/Tissue Integrity  Goal: Absence of new skin breakdown  Description: 1. Monitor for areas of redness and/or skin breakdown  2. Assess vascular access sites hourly  3. Every 4-6 hours minimum:  Change oxygen saturation probe site  4. Every 4-6 hours:  If on nasal continuous positive airway pressure, respiratory therapy assess nares and determine need for appliance change or resting period.   Outcome: Progressing     Problem: Safety - Adult  Goal: Free from fall injury  Outcome: Progressing     Problem: ABCDS Injury Assessment  Goal: Absence of physical injury  Outcome: Progressing     Problem: Respiratory - Adult  Goal: Achieves optimal ventilation and oxygenation  Outcome: Progressing     Problem: Cardiovascular - Adult  Goal: Maintains optimal cardiac output and hemodynamic stability  Outcome: Progressing  Goal: Absence of cardiac dysrhythmias or at baseline  Outcome: Progressing     Problem: Skin/Tissue Integrity - Adult  Goal: Skin integrity remains intact  Outcome: Progressing  Goal: Incisions, wounds, or drain sites healing without S/S of infection  Outcome: Progressing  Goal: Oral mucous membranes remain intact  Outcome: Progressing     Problem: Musculoskeletal - Adult  Goal: Return mobility to safest level of function  Outcome: Progressing  Goal: Maintain proper alignment of affected body part  Outcome: Progressing  Goal: Return ADL status to a safe level of function  Outcome: Progressing     Problem: Gastrointestinal - Adult  Goal: Minimal or absence of nausea and

## 2023-10-22 NOTE — PROGRESS NOTES
CHEST 10/21/2023 12:56 am TECHNIQUE: CTA of the chest was performed after the administration of intravenous contrast.  Multiplanar reformatted images are provided for review. MIP images are provided for review. Automated exposure control, iterative reconstruction, and/or weight based adjustment of the mA/kV was utilized to reduce the radiation dose to as low as reasonably achievable. COMPARISON: Chest radiograph 10/21/2023. HISTORY: ORDERING SYSTEM PROVIDED HISTORY: r/o PE TECHNOLOGIST PROVIDED HISTORY: Reason for exam:->r/o PE Decision Support Exception - unselect if not a suspected or confirmed emergency medical condition->Emergency Medical Condition (MA) Reason for Exam: left side chest pain x3 days, SOB Additional signs and symptoms: recent COVID Relevant Medical/Surgical History: no h/o surg to chest FINDINGS: Pulmonary Arteries: The pulmonary arteries are normal in size. Adequate contrast opacification to the subsegmental level with no acute intraluminal filling defect. Mediastinum: Mild global enlarged heart size. No significant pericardial effusion. Moderate coronary artery calcifications. Mild thoracic aortic atherosclerotic calcifications with no aneurysmal dilatation. Inadequate contrast opacification to evaluate the aortic lumen. No significant enlarged mediastinal or hilar lymph nodes. Lungs/pleura: The trachea and bronchi are patent with mild symmetric bronchial wall thickening. No pneumothorax. Mild simple right pleural effusion with associated atelectasis. No diffuse interstitial edema. No lobar consolidation. At left lower lobe superior segment is a peripheral wedge-shaped patchy opacity. Upper Abdomen: Abdominal aortic atherosclerosis. No acute abnormality identified. Soft Tissues/Bones: The body wall soft tissues demonstrate no acute abnormality. No significant enlarged axillary lymph nodes. Mild bilateral subareolar symmetric soft tissue density suggesting gynecomastia.  Normal hours.  BNP: No results for input(s): \"PROBNP\" in the last 72 hours.   UA:  Lab Results   Component Value Date/Time    NITRU Negative 10/21/2023 04:55 AM    COLORU Yellow 10/21/2023 04:55 AM    PHUR 6.5 10/21/2023 04:55 AM    CLARITYU Clear 10/21/2023 04:55 AM    SPECGRAV 1.010 10/21/2023 04:55 AM    LEUKOCYTESUR Negative 10/21/2023 04:55 AM    UROBILINOGEN 0.2 10/21/2023 04:55 AM    BILIRUBINUR Negative 10/21/2023 04:55 AM    BLOODU Negative 10/21/2023 04:55 AM    GLUCOSEU Negative 10/21/2023 04:55 AM    KETUA Negative 10/21/2023 04:55 AM     Urine Cultures: No results found for: \"LABURIN\"  Blood Cultures: No results found for: \"BC\"  No results found for: \"BLOODCULT2\"  Organism: No results found for: \"ORG\"      Electronically signed by Javi Aragon MD on 10/22/2023 at 11:10 AM

## 2023-10-22 NOTE — PROGRESS NOTES
Daughter (POA) Evelina Kohli states patient has been increasingly confused. She believes he might have early onset dementia. He has not been officially diagnosed. He is a [place] patient, and is unable to get in with a neurologist for 4 months as a result. She states she has also seen a decline in patient's condition since admission. She witnessed him attempting to eat his food with a straw. Would like to have neuro consulted this admission, to possibly diagnose/address these concerns.

## 2023-10-23 VITALS
RESPIRATION RATE: 18 BRPM | BODY MASS INDEX: 32.54 KG/M2 | WEIGHT: 214.73 LBS | TEMPERATURE: 97.7 F | DIASTOLIC BLOOD PRESSURE: 65 MMHG | OXYGEN SATURATION: 97 % | HEIGHT: 68 IN | SYSTOLIC BLOOD PRESSURE: 125 MMHG | HEART RATE: 83 BPM

## 2023-10-23 LAB
ANION GAP SERPL CALCULATED.3IONS-SCNC: 6 MMOL/L (ref 3–16)
BUN SERPL-MCNC: 13 MG/DL (ref 7–20)
CALCIUM SERPL-MCNC: 8 MG/DL (ref 8.3–10.6)
CHLORIDE SERPL-SCNC: 94 MMOL/L (ref 99–110)
CO2 SERPL-SCNC: 30 MMOL/L (ref 21–32)
CREAT SERPL-MCNC: 0.7 MG/DL (ref 0.8–1.3)
DEPRECATED RDW RBC AUTO: 15.1 % (ref 12.4–15.4)
GFR SERPLBLD CREATININE-BSD FMLA CKD-EPI: >60 ML/MIN/{1.73_M2}
GLUCOSE BLD-MCNC: 201 MG/DL (ref 70–99)
GLUCOSE BLD-MCNC: 227 MG/DL (ref 70–99)
GLUCOSE SERPL-MCNC: 209 MG/DL (ref 70–99)
HCT VFR BLD AUTO: 37.7 % (ref 40.5–52.5)
HGB BLD-MCNC: 12.4 G/DL (ref 13.5–17.5)
MCH RBC QN AUTO: 26 PG (ref 26–34)
MCHC RBC AUTO-ENTMCNC: 33 G/DL (ref 31–36)
MCV RBC AUTO: 78.8 FL (ref 80–100)
PERFORMED ON: ABNORMAL
PERFORMED ON: ABNORMAL
PLATELET # BLD AUTO: 208 K/UL (ref 135–450)
PMV BLD AUTO: 9.2 FL (ref 5–10.5)
POC ACT LR: 164 SEC
POTASSIUM SERPL-SCNC: 3.8 MMOL/L (ref 3.5–5.1)
RBC # BLD AUTO: 4.78 M/UL (ref 4.2–5.9)
SODIUM SERPL-SCNC: 130 MMOL/L (ref 136–145)
WBC # BLD AUTO: 10.2 K/UL (ref 4–11)

## 2023-10-23 PROCEDURE — 85027 COMPLETE CBC AUTOMATED: CPT

## 2023-10-23 PROCEDURE — 6370000000 HC RX 637 (ALT 250 FOR IP): Performed by: INTERNAL MEDICINE

## 2023-10-23 PROCEDURE — 6370000000 HC RX 637 (ALT 250 FOR IP): Performed by: STUDENT IN AN ORGANIZED HEALTH CARE EDUCATION/TRAINING PROGRAM

## 2023-10-23 PROCEDURE — 6370000000 HC RX 637 (ALT 250 FOR IP): Performed by: NURSE PRACTITIONER

## 2023-10-23 PROCEDURE — 80048 BASIC METABOLIC PNL TOTAL CA: CPT

## 2023-10-23 PROCEDURE — 2700000000 HC OXYGEN THERAPY PER DAY

## 2023-10-23 PROCEDURE — 36415 COLL VENOUS BLD VENIPUNCTURE: CPT

## 2023-10-23 PROCEDURE — 2500000003 HC RX 250 WO HCPCS: Performed by: NURSE PRACTITIONER

## 2023-10-23 PROCEDURE — 94761 N-INVAS EAR/PLS OXIMETRY MLT: CPT

## 2023-10-23 RX ORDER — NITROGLYCERIN 0.4 MG/1
0.4 TABLET SUBLINGUAL EVERY 5 MIN PRN
Qty: 3 TABLET | Refills: 0 | DISCHARGE
Start: 2023-10-23

## 2023-10-23 RX ORDER — SPIRONOLACTONE 25 MG/1
25 TABLET ORAL DAILY
Qty: 30 TABLET | Refills: 3 | DISCHARGE
Start: 2023-10-23

## 2023-10-23 RX ORDER — METOPROLOL SUCCINATE 25 MG/1
25 TABLET, EXTENDED RELEASE ORAL DAILY
Qty: 30 TABLET | Refills: 3 | DISCHARGE
Start: 2023-10-24

## 2023-10-23 RX ORDER — SPIRONOLACTONE 25 MG/1
25 TABLET ORAL DAILY
Status: DISCONTINUED | OUTPATIENT
Start: 2023-10-23 | End: 2023-10-23 | Stop reason: HOSPADM

## 2023-10-23 RX ORDER — INSULIN GLARGINE 100 [IU]/ML
30 INJECTION, SOLUTION SUBCUTANEOUS NIGHTLY
DISCHARGE
Start: 2023-10-23

## 2023-10-23 RX ADMIN — ASPIRIN 81 MG: 81 TABLET, CHEWABLE ORAL at 09:12

## 2023-10-23 RX ADMIN — GABAPENTIN 200 MG: 100 CAPSULE ORAL at 09:11

## 2023-10-23 RX ADMIN — MICONAZOLE NITRATE: 2 POWDER TOPICAL at 09:00

## 2023-10-23 RX ADMIN — SPIRONOLACTONE 25 MG: 25 TABLET ORAL at 15:06

## 2023-10-23 RX ADMIN — SACUBITRIL AND VALSARTAN 1 TABLET: 24; 26 TABLET, FILM COATED ORAL at 09:11

## 2023-10-23 RX ADMIN — PANTOPRAZOLE SODIUM 40 MG: 40 TABLET, DELAYED RELEASE ORAL at 09:11

## 2023-10-23 RX ADMIN — METOPROLOL SUCCINATE 25 MG: 25 TABLET, EXTENDED RELEASE ORAL at 09:11

## 2023-10-23 RX ADMIN — GABAPENTIN 200 MG: 100 CAPSULE ORAL at 15:06

## 2023-10-23 RX ADMIN — EMPAGLIFLOZIN 10 MG: 10 TABLET, FILM COATED ORAL at 15:06

## 2023-10-23 ASSESSMENT — PAIN SCALES - GENERAL
PAINLEVEL_OUTOF10: 0

## 2023-10-23 NOTE — PROGRESS NOTES
Spoke to the daughter at length regarding her father's condition. Patient's mental status waxes and wanes and may have underlying dementia, could be vascular dementia even. Suspect an element of hospital delirium as well. Patient was alert and oriented this a.m. and had insight into his current medical condition. He would benefit from cognitive evaluation as outpatient and needs to maximize delirium precautions at the [facility] facility including having daughter present routinely, maintaining sleep-wake cycle, good nutrition, and avoiding delirium causing medications.   Stable for discharge with close follow-up

## 2023-10-23 NOTE — PLAN OF CARE
Problem: Discharge Planning  Goal: Discharge to home or other facility with appropriate resources  Outcome: Progressing     Problem: Pain  Goal: Verbalizes/displays adequate comfort level or baseline comfort level  Outcome: Progressing     Problem: Skin/Tissue Integrity  Goal: Absence of new skin breakdown  Description: 1. Monitor for areas of redness and/or skin breakdown  2. Assess vascular access sites hourly  3. Every 4-6 hours minimum:  Change oxygen saturation probe site  4. Every 4-6 hours:  If on nasal continuous positive airway pressure, respiratory therapy assess nares and determine need for appliance change or resting period.   Outcome: Progressing     Problem: Safety - Adult  Goal: Free from fall injury  Outcome: Progressing     Problem: ABCDS Injury Assessment  Goal: Absence of physical injury  Outcome: Progressing     Problem: Respiratory - Adult  Goal: Achieves optimal ventilation and oxygenation  Outcome: Progressing     Problem: Cardiovascular - Adult  Goal: Maintains optimal cardiac output and hemodynamic stability  Outcome: Progressing  Goal: Absence of cardiac dysrhythmias or at baseline  Outcome: Progressing     Problem: Skin/Tissue Integrity - Adult  Goal: Skin integrity remains intact  Outcome: Progressing  Goal: Incisions, wounds, or drain sites healing without S/S of infection  Outcome: Progressing  Goal: Oral mucous membranes remain intact  Outcome: Progressing     Problem: Musculoskeletal - Adult  Goal: Return mobility to safest level of function  Outcome: Progressing  Goal: Maintain proper alignment of affected body part  Outcome: Progressing  Goal: Return ADL status to a safe level of function  Outcome: Progressing     Problem: Gastrointestinal - Adult  Goal: Minimal or absence of nausea and vomiting  Outcome: Progressing  Goal: Maintains or returns to baseline bowel function  Outcome: Progressing  Goal: Maintains adequate nutritional intake  Outcome: Progressing  Goal: Establish

## 2023-10-23 NOTE — CARE COORDINATION
CASE  MANAGEMENT DISCHARGE SUMMARY:    Discharge to: [address]; extended care facility, long term bed. Transportation Mode/Time: [facility], 4:30 pm    Family Notified: yes, call to daughter to notify    New Durable Medical Equipment: none    HENS if applicable: n/a    Precertification obtained if needed: n/a    RN to RN report if SNF at dc: [phone]; ask for RN taking care of patient on LTC unit. social work has notified facility admissions to return to facility to pull dc paperwork from [facility]    Nurse responsible for completion of pg 1/2 of YUSUF (continuity of care form), med reconciliation/AVS, and to place copies of each in patient's hard chart. Nurse to ensure that any written prescriptions; copy of the patients chart to accompany patient to facility.

## 2023-10-23 NOTE — DISCHARGE INSTR - COC
Continuity of Care Form    Patient Name: Lina Baca   :  1945  MRN:  [id number]    Admit date:  10/21/2023  Discharge date:  ***    Code Status Order: Full Code   Advance Directives:     Admitting Physician: Nicole Chandler MD  PCP: Sami Herrera MD    Discharging Nurse: [facility] Unit/Room#: 8632/4342-12  Discharging Unit Phone Number: ***    Emergency Contact:   Extended Emergency Contact Information  Primary Emergency Contact: One S[place] Phone: [phone]  Relation: Child  Secondary Emergency Contact: Dinora Young  Address: [address] [place] [address], [address] [place] [address],[address] [facility] [address] [address] Phone: [phone]  Relation: Spouse    Past Surgical History:  Past Surgical History:   Procedure Laterality Date    FOREARM SURGERY      wires from elbow to wrist    ORTHOPEDIC SURGERY      hip surgery    PROSTATECTOMY         Immunization History: There is no immunization history on file for this patient.     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

## 2023-10-23 NOTE — CARE COORDINATION
Spoke with daughter regarding dc plan today; daughter asking for MD to contact her; [employer] msg sent.      LG

## 2023-10-26 NOTE — PROGRESS NOTES
Physician Progress Note      PATIENT:               Jade Bean  CSN #:                  [id number]  :                       1945  ADMIT DATE:       10/21/2023 12:15 AM  DISCH DATE:        10/23/2023 4:38 PM  RESPONDING  PROVIDER #:        Diamante Melton MAY          QUERY TEXT:    Patient admitted with chest pain. Noted documentation of STEMI per attending   and Takotsubo cardiomyopathy with no plaque rupture noted on cath. If possible, please document in progress notes and discharge summary if you   are evaluating and /or treating any of the following: The medical record reflects the following:  Risk Factors: Diabetes, CAD  Clinical Indicators: Per cath report \"Diffuse small vessel disease. Intermediate grade stenosis of mid RCA. Per Discharge summary \"Echo showed   ejection fraction of 30 to 25%, hypokinesis of all apical wall segments   suggestive of Takotsubo, grade 1 diastolic dysfunction with normal left   ventricular pressure, right ventricle is normal in size and function. Per   Cardiology, GDMT recommended for stress cardiomyopathy, started on Entresto,   aldactone, and Jardiance\". Troponin peak 26  Treatment: Cardiology consult, [facility], serial labs, supportive care, ECHO    Thank you,  Gagan Lizarraga RN BSN  Options provided:  -- STEMI ruled out, chest pain due to Takotsubo cardiomyopathy  -- Chest pain due to STEMI  -- Other - I will add my own diagnosis  -- Disagree - Not applicable / Not valid  -- Disagree - Clinically unable to determine / Unknown  -- Refer to Clinical Documentation Reviewer    PROVIDER RESPONSE TEXT:    This patient has chest pain due to STEMI.     Query created by: Gagan Lizarraga on 10/26/2023 3:25 PM      Electronically signed by:  Vikki Paz 10/26/2023 3:35 PM

## 2023-11-17 ENCOUNTER — TRANSCRIBE ORDERS (OUTPATIENT)
Dept: ADMINISTRATIVE | Age: 78
End: 2023-11-17

## 2023-11-17 DIAGNOSIS — I50.9 CONGESTIVE HEART FAILURE, UNSPECIFIED HF CHRONICITY, UNSPECIFIED HEART FAILURE TYPE (HCC): Primary | ICD-10-CM

## 2023-11-17 DIAGNOSIS — J41.0 SIMPLE CHRONIC BRONCHITIS (HCC): Primary | ICD-10-CM
